# Patient Record
Sex: MALE | Race: WHITE | Employment: OTHER | URBAN - METROPOLITAN AREA
[De-identification: names, ages, dates, MRNs, and addresses within clinical notes are randomized per-mention and may not be internally consistent; named-entity substitution may affect disease eponyms.]

---

## 2018-04-30 ENCOUNTER — APPOINTMENT (OUTPATIENT)
Dept: GENERAL RADIOLOGY | Age: 68
End: 2018-04-30
Attending: PHYSICIAN ASSISTANT
Payer: MEDICARE

## 2018-04-30 ENCOUNTER — HOSPITAL ENCOUNTER (OUTPATIENT)
Age: 68
Setting detail: OBSERVATION
Discharge: HOME | End: 2018-05-01
Attending: EMERGENCY MEDICINE | Admitting: SPECIALIST
Payer: MEDICARE

## 2018-04-30 DIAGNOSIS — I21.4 NSTEMI (NON-ST ELEVATED MYOCARDIAL INFARCTION) (HCC): Primary | ICD-10-CM

## 2018-04-30 PROBLEM — I25.10 CAD (CORONARY ARTERY DISEASE): Status: ACTIVE | Noted: 2018-04-30

## 2018-04-30 LAB
ALBUMIN SERPL-MCNC: 3.8 G/DL (ref 3.5–5)
ALBUMIN/GLOB SERPL: 1 {RATIO} (ref 1.1–2.2)
ALP SERPL-CCNC: 82 U/L (ref 45–117)
ALT SERPL-CCNC: 29 U/L (ref 12–78)
ANION GAP SERPL CALC-SCNC: 7 MMOL/L (ref 5–15)
APTT PPP: 28.5 SEC (ref 22.1–32)
AST SERPL-CCNC: 30 U/L (ref 15–37)
ATRIAL RATE: 66 BPM
ATRIAL RATE: 67 BPM
BASOPHILS # BLD: 0 K/UL (ref 0–0.1)
BASOPHILS NFR BLD: 1 % (ref 0–1)
BILIRUB SERPL-MCNC: 0.7 MG/DL (ref 0.2–1)
BNP SERPL-MCNC: 208 PG/ML (ref 0–125)
BUN SERPL-MCNC: 20 MG/DL (ref 6–20)
BUN/CREAT SERPL: 19 (ref 12–20)
CALCIUM SERPL-MCNC: 9.3 MG/DL (ref 8.5–10.1)
CALCULATED P AXIS, ECG09: 47 DEGREES
CALCULATED P AXIS, ECG09: 64 DEGREES
CALCULATED R AXIS, ECG10: 10 DEGREES
CALCULATED R AXIS, ECG10: 28 DEGREES
CALCULATED T AXIS, ECG11: 50 DEGREES
CALCULATED T AXIS, ECG11: 56 DEGREES
CHLORIDE SERPL-SCNC: 101 MMOL/L (ref 97–108)
CO2 SERPL-SCNC: 28 MMOL/L (ref 21–32)
CREAT SERPL-MCNC: 1.05 MG/DL (ref 0.7–1.3)
DIAGNOSIS, 93000: NORMAL
DIAGNOSIS, 93000: NORMAL
DIFFERENTIAL METHOD BLD: NORMAL
EOSINOPHIL # BLD: 0.1 K/UL (ref 0–0.4)
EOSINOPHIL NFR BLD: 1 % (ref 0–7)
ERYTHROCYTE [DISTWIDTH] IN BLOOD BY AUTOMATED COUNT: 11.9 % (ref 11.5–14.5)
GLOBULIN SER CALC-MCNC: 3.7 G/DL (ref 2–4)
GLUCOSE SERPL-MCNC: 109 MG/DL (ref 65–100)
HCT VFR BLD AUTO: 42.8 % (ref 36.6–50.3)
HGB BLD-MCNC: 14.4 G/DL (ref 12.1–17)
IMM GRANULOCYTES # BLD: 0 K/UL (ref 0–0.04)
IMM GRANULOCYTES NFR BLD AUTO: 0 % (ref 0–0.5)
LYMPHOCYTES # BLD: 1 K/UL (ref 0.8–3.5)
LYMPHOCYTES NFR BLD: 17 % (ref 12–49)
MCH RBC QN AUTO: 32.4 PG (ref 26–34)
MCHC RBC AUTO-ENTMCNC: 33.6 G/DL (ref 30–36.5)
MCV RBC AUTO: 96.2 FL (ref 80–99)
MONOCYTES # BLD: 0.6 K/UL (ref 0–1)
MONOCYTES NFR BLD: 10 % (ref 5–13)
NEUTS SEG # BLD: 4 K/UL (ref 1.8–8)
NEUTS SEG NFR BLD: 71 % (ref 32–75)
NRBC # BLD: 0 K/UL (ref 0–0.01)
NRBC BLD-RTO: 0 PER 100 WBC
P-R INTERVAL, ECG05: 172 MS
P-R INTERVAL, ECG05: 176 MS
PLATELET # BLD AUTO: 176 K/UL (ref 150–400)
PMV BLD AUTO: 10.5 FL (ref 8.9–12.9)
POTASSIUM SERPL-SCNC: 3.8 MMOL/L (ref 3.5–5.1)
PROT SERPL-MCNC: 7.5 G/DL (ref 6.4–8.2)
Q-T INTERVAL, ECG07: 414 MS
Q-T INTERVAL, ECG07: 426 MS
QRS DURATION, ECG06: 108 MS
QRS DURATION, ECG06: 114 MS
QTC CALCULATION (BEZET), ECG08: 434 MS
QTC CALCULATION (BEZET), ECG08: 450 MS
RBC # BLD AUTO: 4.45 M/UL (ref 4.1–5.7)
SODIUM SERPL-SCNC: 136 MMOL/L (ref 136–145)
THERAPEUTIC RANGE,PTTT: NORMAL SECS (ref 58–77)
TROPONIN I SERPL-MCNC: 0.38 NG/ML
VENTRICULAR RATE, ECG03: 66 BPM
VENTRICULAR RATE, ECG03: 67 BPM
WBC # BLD AUTO: 5.6 K/UL (ref 4.1–11.1)

## 2018-04-30 PROCEDURE — C1887 CATHETER, GUIDING: HCPCS

## 2018-04-30 PROCEDURE — C1874 STENT, COATED/COV W/DEL SYS: HCPCS

## 2018-04-30 PROCEDURE — 99285 EMERGENCY DEPT VISIT HI MDM: CPT

## 2018-04-30 PROCEDURE — 74011000258 HC RX REV CODE- 258: Performed by: SPECIALIST

## 2018-04-30 PROCEDURE — 96372 THER/PROPH/DIAG INJ SC/IM: CPT

## 2018-04-30 PROCEDURE — 84484 ASSAY OF TROPONIN QUANT: CPT | Performed by: PHYSICIAN ASSISTANT

## 2018-04-30 PROCEDURE — 74011250637 HC RX REV CODE- 250/637: Performed by: EMERGENCY MEDICINE

## 2018-04-30 PROCEDURE — 93005 ELECTROCARDIOGRAM TRACING: CPT

## 2018-04-30 PROCEDURE — 74011250636 HC RX REV CODE- 250/636: Performed by: SPECIALIST

## 2018-04-30 PROCEDURE — 77030002996 HC SUT SLK J&J -A

## 2018-04-30 PROCEDURE — 93454 CORONARY ARTERY ANGIO S&I: CPT

## 2018-04-30 PROCEDURE — C1769 GUIDE WIRE: HCPCS

## 2018-04-30 PROCEDURE — 96375 TX/PRO/DX INJ NEW DRUG ADDON: CPT

## 2018-04-30 PROCEDURE — 85730 THROMBOPLASTIN TIME PARTIAL: CPT | Performed by: SPECIALIST

## 2018-04-30 PROCEDURE — C1894 INTRO/SHEATH, NON-LASER: HCPCS

## 2018-04-30 PROCEDURE — 96374 THER/PROPH/DIAG INJ IV PUSH: CPT

## 2018-04-30 PROCEDURE — 83880 ASSAY OF NATRIURETIC PEPTIDE: CPT | Performed by: PHYSICIAN ASSISTANT

## 2018-04-30 PROCEDURE — C1725 CATH, TRANSLUMIN NON-LASER: HCPCS

## 2018-04-30 PROCEDURE — 71046 X-RAY EXAM CHEST 2 VIEWS: CPT

## 2018-04-30 PROCEDURE — 77030004533 HC CATH ANGI DX IMP BSC -B

## 2018-04-30 PROCEDURE — 85025 COMPLETE CBC W/AUTO DIFF WBC: CPT | Performed by: PHYSICIAN ASSISTANT

## 2018-04-30 PROCEDURE — 77030013744

## 2018-04-30 PROCEDURE — 99218 HC RM OBSERVATION: CPT

## 2018-04-30 PROCEDURE — 74011000250 HC RX REV CODE- 250: Performed by: SPECIALIST

## 2018-04-30 PROCEDURE — 74011250637 HC RX REV CODE- 250/637: Performed by: SPECIALIST

## 2018-04-30 PROCEDURE — 65390000012 HC CONDITION CODE 44 OBSERVATION

## 2018-04-30 PROCEDURE — 36415 COLL VENOUS BLD VENIPUNCTURE: CPT | Performed by: PHYSICIAN ASSISTANT

## 2018-04-30 PROCEDURE — 74011250637 HC RX REV CODE- 250/637: Performed by: NURSE PRACTITIONER

## 2018-04-30 PROCEDURE — 80053 COMPREHEN METABOLIC PANEL: CPT | Performed by: PHYSICIAN ASSISTANT

## 2018-04-30 PROCEDURE — 93306 TTE W/DOPPLER COMPLETE: CPT

## 2018-04-30 PROCEDURE — 74011250636 HC RX REV CODE- 250/636: Performed by: NURSE PRACTITIONER

## 2018-04-30 PROCEDURE — 77030013715 HC INFL SYS MRTM -B

## 2018-04-30 PROCEDURE — 74011636320 HC RX REV CODE- 636/320: Performed by: SPECIALIST

## 2018-04-30 RX ORDER — LEVOTHYROXINE SODIUM 100 UG/1
100 TABLET ORAL
COMMUNITY

## 2018-04-30 RX ORDER — MIDAZOLAM HYDROCHLORIDE 1 MG/ML
1-10 INJECTION, SOLUTION INTRAMUSCULAR; INTRAVENOUS
Status: DISCONTINUED | OUTPATIENT
Start: 2018-04-30 | End: 2018-04-30

## 2018-04-30 RX ORDER — SODIUM CHLORIDE 0.9 % (FLUSH) 0.9 %
5 SYRINGE (ML) INJECTION AS NEEDED
Status: DISCONTINUED | OUTPATIENT
Start: 2018-04-30 | End: 2018-04-30

## 2018-04-30 RX ORDER — ATORVASTATIN CALCIUM 20 MG/1
20 TABLET, FILM COATED ORAL
Status: DISCONTINUED | OUTPATIENT
Start: 2018-04-30 | End: 2018-05-01

## 2018-04-30 RX ORDER — CLOPIDOGREL 300 MG/1
600 TABLET, FILM COATED ORAL AS NEEDED
Status: DISCONTINUED | OUTPATIENT
Start: 2018-04-30 | End: 2018-04-30

## 2018-04-30 RX ORDER — SODIUM CHLORIDE 9 MG/ML
3 INJECTION, SOLUTION INTRAVENOUS CONTINUOUS
Status: DISPENSED | OUTPATIENT
Start: 2018-04-30 | End: 2018-04-30

## 2018-04-30 RX ORDER — ATROPINE SULFATE 0.1 MG/ML
.5-1 INJECTION INTRAVENOUS AS NEEDED
Status: DISCONTINUED | OUTPATIENT
Start: 2018-04-30 | End: 2018-04-30

## 2018-04-30 RX ORDER — LORAZEPAM 2 MG/ML
4 INJECTION INTRAMUSCULAR
Status: DISCONTINUED | OUTPATIENT
Start: 2018-04-30 | End: 2018-05-01 | Stop reason: HOSPADM

## 2018-04-30 RX ORDER — SODIUM CHLORIDE 9 MG/ML
1.5 INJECTION, SOLUTION INTRAVENOUS CONTINUOUS
Status: DISPENSED | OUTPATIENT
Start: 2018-04-30 | End: 2018-04-30

## 2018-04-30 RX ORDER — MELOXICAM 15 MG/1
15 TABLET ORAL
COMMUNITY
End: 2019-02-25

## 2018-04-30 RX ORDER — HYDROCHLOROTHIAZIDE 25 MG/1
25 TABLET ORAL DAILY
Status: DISCONTINUED | OUTPATIENT
Start: 2018-05-01 | End: 2018-05-01 | Stop reason: HOSPADM

## 2018-04-30 RX ORDER — VALACYCLOVIR HYDROCHLORIDE 1 G/1
1000 TABLET, FILM COATED ORAL AS NEEDED
COMMUNITY
End: 2018-08-23

## 2018-04-30 RX ORDER — HEPARIN SODIUM 200 [USP'U]/100ML
1000 INJECTION, SOLUTION INTRAVENOUS AS NEEDED
Status: DISCONTINUED | OUTPATIENT
Start: 2018-04-30 | End: 2018-04-30

## 2018-04-30 RX ORDER — FENTANYL CITRATE 50 UG/ML
25-200 INJECTION, SOLUTION INTRAMUSCULAR; INTRAVENOUS
Status: DISCONTINUED | OUTPATIENT
Start: 2018-04-30 | End: 2018-04-30

## 2018-04-30 RX ORDER — CLOPIDOGREL BISULFATE 75 MG/1
75 TABLET ORAL DAILY
Status: DISCONTINUED | OUTPATIENT
Start: 2018-05-01 | End: 2018-05-01 | Stop reason: HOSPADM

## 2018-04-30 RX ORDER — HEPARIN SODIUM 10000 [USP'U]/100ML
10-25 INJECTION, SOLUTION INTRAVENOUS
Status: DISCONTINUED | OUTPATIENT
Start: 2018-04-30 | End: 2018-04-30

## 2018-04-30 RX ORDER — ZOLPIDEM TARTRATE 10 MG/1
10 TABLET ORAL
Status: DISCONTINUED | OUTPATIENT
Start: 2018-04-30 | End: 2018-05-01 | Stop reason: HOSPADM

## 2018-04-30 RX ORDER — LIDOCAINE HYDROCHLORIDE 10 MG/ML
10-30 INJECTION INFILTRATION; PERINEURAL
Status: DISCONTINUED | OUTPATIENT
Start: 2018-04-30 | End: 2018-04-30

## 2018-04-30 RX ORDER — ASPIRIN 81 MG/1
81 TABLET ORAL DAILY
Status: DISCONTINUED | OUTPATIENT
Start: 2018-05-01 | End: 2018-05-01 | Stop reason: HOSPADM

## 2018-04-30 RX ORDER — AMLODIPINE BESYLATE 5 MG/1
5 TABLET ORAL DAILY
COMMUNITY
End: 2018-05-30

## 2018-04-30 RX ORDER — HEPARIN SODIUM 5000 [USP'U]/ML
4000 INJECTION, SOLUTION INTRAVENOUS; SUBCUTANEOUS
Status: COMPLETED | OUTPATIENT
Start: 2018-04-30 | End: 2018-04-30

## 2018-04-30 RX ORDER — DICLOFENAC SODIUM 10 MG/G
2-4 GEL TOPICAL
COMMUNITY

## 2018-04-30 RX ORDER — SODIUM CHLORIDE 9 MG/ML
3 INJECTION, SOLUTION INTRAVENOUS AS NEEDED
Status: DISPENSED | OUTPATIENT
Start: 2018-04-30 | End: 2018-04-30

## 2018-04-30 RX ORDER — ONDANSETRON 2 MG/ML
4 INJECTION INTRAMUSCULAR; INTRAVENOUS AS NEEDED
Status: DISCONTINUED | OUTPATIENT
Start: 2018-04-30 | End: 2018-04-30

## 2018-04-30 RX ORDER — AMLODIPINE BESYLATE 5 MG/1
5 TABLET ORAL DAILY
Status: DISCONTINUED | OUTPATIENT
Start: 2018-05-01 | End: 2018-05-01 | Stop reason: HOSPADM

## 2018-04-30 RX ORDER — LOSARTAN POTASSIUM AND HYDROCHLOROTHIAZIDE 25; 100 MG/1; MG/1
1 TABLET ORAL DAILY
COMMUNITY
End: 2018-08-23 | Stop reason: DRUGHIGH

## 2018-04-30 RX ORDER — LOSARTAN POTASSIUM 50 MG/1
100 TABLET ORAL DAILY
Status: DISCONTINUED | OUTPATIENT
Start: 2018-05-01 | End: 2018-05-01 | Stop reason: HOSPADM

## 2018-04-30 RX ORDER — ASPIRIN 81 MG/1
81 TABLET ORAL DAILY
COMMUNITY

## 2018-04-30 RX ORDER — LORAZEPAM 2 MG/ML
2 INJECTION INTRAMUSCULAR
Status: DISCONTINUED | OUTPATIENT
Start: 2018-04-30 | End: 2018-05-01 | Stop reason: HOSPADM

## 2018-04-30 RX ORDER — LEVOTHYROXINE SODIUM 100 UG/1
100 TABLET ORAL
Status: DISCONTINUED | OUTPATIENT
Start: 2018-05-01 | End: 2018-05-01 | Stop reason: HOSPADM

## 2018-04-30 RX ORDER — ZOLPIDEM TARTRATE 10 MG/1
10 TABLET ORAL
COMMUNITY

## 2018-04-30 RX ORDER — NITROGLYCERIN 20 MG/100ML
5 INJECTION INTRAVENOUS
Status: DISCONTINUED | OUTPATIENT
Start: 2018-04-30 | End: 2018-04-30

## 2018-04-30 RX ORDER — SODIUM CHLORIDE 9 MG/ML
1.5 INJECTION, SOLUTION INTRAVENOUS AS NEEDED
Status: DISCONTINUED | OUTPATIENT
Start: 2018-04-30 | End: 2018-04-30 | Stop reason: HOSPADM

## 2018-04-30 RX ORDER — EPTIFIBATIDE 0.75 MG/ML
2 INJECTION, SOLUTION INTRAVENOUS AS NEEDED
Status: DISCONTINUED | OUTPATIENT
Start: 2018-04-30 | End: 2018-04-30

## 2018-04-30 RX ORDER — MORPHINE SULFATE 10 MG/ML
2 INJECTION, SOLUTION INTRAMUSCULAR; INTRAVENOUS ONCE
Status: COMPLETED | OUTPATIENT
Start: 2018-04-30 | End: 2018-04-30

## 2018-04-30 RX ORDER — HEPARIN SODIUM 1000 [USP'U]/ML
1000-10000 INJECTION, SOLUTION INTRAVENOUS; SUBCUTANEOUS AS NEEDED
Status: DISCONTINUED | OUTPATIENT
Start: 2018-04-30 | End: 2018-04-30

## 2018-04-30 RX ADMIN — CLOPIDOGREL BISULFATE 600 MG: 300 TABLET, FILM COATED ORAL at 16:57

## 2018-04-30 RX ADMIN — ZOLPIDEM TARTRATE 10 MG: 10 TABLET ORAL at 23:30

## 2018-04-30 RX ADMIN — BIVALIRUDIN 169.05 MG/HR: 250 INJECTION, POWDER, LYOPHILIZED, FOR SOLUTION INTRAVENOUS at 16:26

## 2018-04-30 RX ADMIN — FENTANYL CITRATE 25 MCG: 50 INJECTION, SOLUTION INTRAMUSCULAR; INTRAVENOUS at 16:10

## 2018-04-30 RX ADMIN — FENTANYL CITRATE 50 MCG: 50 INJECTION, SOLUTION INTRAMUSCULAR; INTRAVENOUS at 16:55

## 2018-04-30 RX ADMIN — FENTANYL CITRATE 25 MCG: 50 INJECTION, SOLUTION INTRAMUSCULAR; INTRAVENOUS at 16:31

## 2018-04-30 RX ADMIN — NITROGLYCERIN 1 INCH: 20 OINTMENT TOPICAL at 14:00

## 2018-04-30 RX ADMIN — FENTANYL CITRATE 25 MCG: 50 INJECTION, SOLUTION INTRAMUSCULAR; INTRAVENOUS at 16:38

## 2018-04-30 RX ADMIN — SODIUM CHLORIDE 1.5 ML/KG/HR: 900 INJECTION, SOLUTION INTRAVENOUS at 16:56

## 2018-04-30 RX ADMIN — MIDAZOLAM HYDROCHLORIDE 1 MG: 1 INJECTION, SOLUTION INTRAMUSCULAR; INTRAVENOUS at 16:30

## 2018-04-30 RX ADMIN — MIDAZOLAM HYDROCHLORIDE 1 MG: 1 INJECTION, SOLUTION INTRAMUSCULAR; INTRAVENOUS at 16:55

## 2018-04-30 RX ADMIN — MIDAZOLAM HYDROCHLORIDE 1 MG: 1 INJECTION, SOLUTION INTRAMUSCULAR; INTRAVENOUS at 16:16

## 2018-04-30 RX ADMIN — IOPAMIDOL 250 ML: 755 INJECTION, SOLUTION INTRAVENOUS at 16:57

## 2018-04-30 RX ADMIN — MIDAZOLAM HYDROCHLORIDE 2 MG: 1 INJECTION, SOLUTION INTRAMUSCULAR; INTRAVENOUS at 16:10

## 2018-04-30 RX ADMIN — LIDOCAINE HYDROCHLORIDE 10 ML: 10 INJECTION, SOLUTION INFILTRATION; PERINEURAL at 16:17

## 2018-04-30 RX ADMIN — SODIUM CHLORIDE 1.5 ML/KG/HR: 900 INJECTION, SOLUTION INTRAVENOUS at 18:04

## 2018-04-30 RX ADMIN — ATORVASTATIN CALCIUM 20 MG: 20 TABLET, FILM COATED ORAL at 22:02

## 2018-04-30 RX ADMIN — FENTANYL CITRATE 25 MCG: 50 INJECTION, SOLUTION INTRAMUSCULAR; INTRAVENOUS at 16:16

## 2018-04-30 RX ADMIN — HEPARIN SODIUM AND DEXTROSE 10 UNITS/KG/HR: 10000; 5 INJECTION INTRAVENOUS at 14:47

## 2018-04-30 RX ADMIN — MIDAZOLAM HYDROCHLORIDE 1 MG: 1 INJECTION, SOLUTION INTRAMUSCULAR; INTRAVENOUS at 16:38

## 2018-04-30 RX ADMIN — SODIUM CHLORIDE 3 ML/KG/HR: 900 INJECTION, SOLUTION INTRAVENOUS at 15:41

## 2018-04-30 RX ADMIN — IOPAMIDOL 50 ML: 755 INJECTION, SOLUTION INTRAVENOUS at 16:30

## 2018-04-30 RX ADMIN — HEPARIN SODIUM 4000 UNITS: 5000 INJECTION, SOLUTION INTRAVENOUS; SUBCUTANEOUS at 14:46

## 2018-04-30 RX ADMIN — MORPHINE SULFATE 2 MG: 10 INJECTION, SOLUTION INTRAMUSCULAR; INTRAVENOUS at 14:46

## 2018-04-30 RX ADMIN — HEPARIN SODIUM 2000 UNITS: 200 INJECTION, SOLUTION INTRAVENOUS at 16:09

## 2018-04-30 NOTE — PROGRESS NOTES
Cardiac Cath Lab Procedure Area Arrival Note:    Dago Barraza arrived to Cardiac Cath Lab, Procedure Area. Patient identifiers verified with NAME and DATE OF BIRTH. Procedure verified with patient. Consent forms verified. Allergies verified. Patient informed of procedure and plan of care. Questions answered with review. Patient voiced understanding of procedure and plan of care. Patient on cardiac monitor, non-invasive blood pressure, SPO2 monitor. On O2 @ 2 lpm via NC.  IV of NSS on pump at 298 ml/hr per SAFIA protocol. Patient status doing well without problems. Patient is A&Ox 4. Patient reports no complaints of pain or shortness of breath. Patient medicated during procedure with orders obtained and verified by Dr. Mellie Bloch. Refer to patients Cardiac Cath Lab PROCEDURE REPORT for vital signs, assessment, status, and response during procedure, printed at end of case. Printed report on chart or scanned into chart. 1707-Transfer to [facility] from Procedure Area    Verbal report given to Zulema Chowdhury on Dago Barraza being transferred to Cardiac Cath Lab RR for routine progression of care   Patient is post [facility] and PCI of Circ procedure. Patient stable upon transfer to . Report consisted of patients Situation, Background, Assessment and   Recommendations(SBAR). Information from the following report(s) SBAR, OR Summary and MAR was reviewed with the receiving nurse. Opportunity for questions and clarification was provided. Patient medicated during procedure with orders obtained and verified by Dr. Mellie Bloch. Refer to patient PROCEDURE REPORT for vital signs, assessment, status, and response during procedure.

## 2018-04-30 NOTE — PROGRESS NOTES
TRANSFER - OUT REPORT:    Verbal report given to Lissy RN on Mona Vines being transferred to Room 453 for routine progression of care       Report consisted of patients Situation, Background, Assessment and   Recommendations(SBAR). Information from the following report(s) SBAR, OR Summary, MAR, Recent Results and Cardiac Rhythm Sinus Ivin Pert was reviewed with the receiving nurse. Opportunity for questions and clarification was provided.

## 2018-04-30 NOTE — ED NOTES
Dr. Farhad Penaloza at bedside. Patient reports that CP returned 20 minutes ago. Pt states pain radiates to both shoulders. Pt reports taking 325mg aspirin this AM PTA. Spouse at bedside. Pt reports shakiness, slight dizziness, and diaphoresis through the night at home with CP episodes.  Denies n/v.

## 2018-04-30 NOTE — ED NOTES
TRANSFER - OUT REPORT:    Verbal report given to Etelvina Santillan (name) in the Recovery Room on Patrizia Gu  being transferred to the Cath Lab for ordered procedure       Report consisted of patients Situation, Background, Assessment and   Recommendations(SBAR). Information from the following report(s) SBAR, ED Summary, MAR, Recent Results and Cardiac Rhythm NSR with incomplete LBBB was reviewed with the receiving nurse. Lines:   Peripheral IV 04/30/18 Right Antecubital (Active)   Site Assessment Clean, dry, & intact 4/30/2018  1:02 PM   Phlebitis Assessment 0 4/30/2018  1:02 PM   Infiltration Assessment 0 4/30/2018  1:02 PM   Dressing Status Clean, dry, & intact 4/30/2018  1:02 PM   Dressing Type Transparent 4/30/2018  1:02 PM   Hub Color/Line Status Pink;Capped;Flushed;Patent 4/30/2018  1:02 PM   Action Taken Blood drawn 4/30/2018  1:02 PM        Opportunity for questions and clarification was provided. Patient transported with:   O2 @ 2 liters and 2 staff from the Cath Lab. Wife sent with Cath Lab team to waiting room. Heparin infusion begun, no acute distress upon leaving ER.

## 2018-04-30 NOTE — ED PROVIDER NOTES
HPI Comments: 75 y.o. male with past medical history significant for HTN who presents from home via private vehicle with chief complaint of chest pain. Pt reports having a 45 minute episode of substernal chest pain starting at 0900 this morning described as \"tightness\" radiating into his bilateral shoulders with accompanying slight lightheadedness and \"shaky\" feeling while sitting, then another similar episode at 1130 that was less intense and shorter in duration. Pt reports taking a 325mg ASA this morning. Pt states the pain was unchanged by exertion. Pt reports he normally takes a baby ASA daily, denies any cardiac history. Pt denies any diaphoresis, SOB, dizziness, nausea, urinary or bowel changes. There are no other acute medical concerns at this time. Social hx: Nonsmoker; Current EtOH use  PCP: Kay Ferrera MD    Note written by Tawana Kuhn, as dictated by Guicho Majano MD 1:52 PM      The history is provided by the patient and the spouse. No  was used. Past Medical History:   Diagnosis Date    Hypertension        History reviewed. No pertinent surgical history. History reviewed. No pertinent family history. Social History     Social History    Marital status:      Spouse name: N/A    Number of children: N/A    Years of education: N/A     Occupational History    Not on file. Social History Main Topics    Smoking status: Never Smoker    Smokeless tobacco: Never Used    Alcohol use 3.6 oz/week     6 Shots of liquor per week      Comment: \"6 shots of liquor per night\"    Drug use: No    Sexual activity: Not on file     Other Topics Concern    Not on file     Social History Narrative    No narrative on file         ALLERGIES: Review of patient's allergies indicates no known allergies. Review of Systems   Constitutional: Negative for chills and fever. HENT: Negative for rhinorrhea and sore throat.     Respiratory: Negative for cough and shortness of breath. Cardiovascular: Positive for chest pain. Gastrointestinal: Negative for abdominal pain, constipation, diarrhea, nausea and vomiting. Genitourinary: Negative for decreased urine volume, difficulty urinating, dysuria, frequency, hematuria and urgency. Musculoskeletal: Positive for arthralgias. Negative for myalgias. Skin: Negative for pallor and rash. Neurological: Positive for light-headedness. Negative for dizziness and weakness. All other systems reviewed and are negative. Vitals:    04/30/18 1343 04/30/18 1344 04/30/18 1348 04/30/18 1400   BP: 156/79 154/85  153/86   Pulse:  68 67 64   Resp:  16 15    SpO2:  99% 100%    Weight:  96.6 kg (213 lb)     Height:  5' 9\" (1.753 m)              Physical Exam   Vital signs reviewed. Nursing notes reviewed. Const:  No acute distress, well developed, well nourished  Head:  Atraumatic, normocephalic  Eyes:  PERRL, conjunctiva normal, no scleral icterus  Neck:  Supple, trachea midline  Cardiovascular:  RRR, no murmurs, no gallops, no rubs  Resp:  No resp distress, no increased work of breathing, no wheezes, no rhonchi, no rales,  Abd:  Soft, non-tender, non-distended, no rebound, no guarding, no CVA tenderness  :  Deferred  MSK:  No pedal edema, normal ROM  Neuro:  Alert and oriented x3, no cranial nerve defect  Skin:  Warm, dry, intact  Psych: normal mood and affect, behavior is normal, judgement and thought content is normal    Note written by Tawana Em, as dictated by Juan Luis Wong MD 1:52 PM    MDM  Number of Diagnoses or Management Options     Amount and/or Complexity of Data Reviewed  Clinical lab tests: ordered and reviewed  Tests in the radiology section of CPT®: ordered and reviewed  Review and summarize past medical records: yes    Patient Progress  Patient progress: stable      ED Course     Pt. Presents to the ER with complaints of chest pain. No signs of STEMI on EKG.   Pt. Has an elevated troponin. Pt's sx consistent with NSTEMI. Pt. To be admitted for further care by cardiology. Procedures    PROGRESS NOTE:  2:29 PM  Corinna Cobos and Dr. Sundeep Ruiz have seen the pt, pt has a cath scheduled for 3:45 today with Dr. Jazmin Decker.

## 2018-04-30 NOTE — H&P
Cardiology H&P Note      Patient Name: Lakesha Lepe  :  MRN: [id number]  Date: 2018  Time: 3:02 PM    Admit Diagnosis: NSTEMI (non-ST elevated myocardial infarction) [facility])    Primary Cardiologist: None    Consulting Cardiologist: Caridad Vo. Geovanna Mensah M.D.   PCP: Dr. Amanda Shaikh MD    Reason for Consult: chest pain, elevated troponin  Assessment/Plan/Discussion:Cardiology Attending:     Patient seen on the day of progress note and examined  and agree with Advance Practice Provider (LEON, NP,PA)  assessment and plans. Lakesha Lepe is a 75 y.o. male     Admit for CP , needs cath  3-4 episodes stuttering substernal chest pain with shortness of breath some with exertion today  Felt trouble sleeping last 3 night  Did yardwork yesterday but not mowing  Today new CP  No prior CAD, CHF known  First trop is abnormal at 0.36  On exam no rales, or edema  With CP , two EKG show NSR with ILBBB and no acute changes  risks and benefits discussed  1000 serious life threatening risk includes stroke, heart attack leading to  death  1/100 prolong hospital stay above and bleeding, groin complications, infection, renals possible but  unlikely unless baseline renal dysfunction, tear in cardiac vessel, tamponade  PCI  similar complications but benefits likely outweigh risk   Dr Ashkan Medina today at 345 pm  Started Oxygen, heparin, morphine x 2 mg , add IV beta blocker is needed  Use ASA and statin   Discussed with wife, the patient and Dr Valentino Calico in  [facility] for Etoh withdrawal  Stat echo  Chandan Loo MD            HPI:  Lakesha Lepe is a 75 y.o. male admitted on 2018  for NSTEMI (non-ST elevated myocardial infarction) ([facility] [address] Has PMH of HTN, hypothyroidism. He presents from home with chief c/o chest pain. Reports episode of substernal chest pain starting this a.m. At 0900. Started after he ate and was sitting up.   Reports pain as a tightness that radiates to both shoulders, neck and reports pain in back. Pain not worse with deep inspiration. No nausea but did have mild diaphoresis, lightheadedness. No nausea or vomiting. Episode lasted 45 min. Went to his daughters home and then climbed up and down stairs, shortly thereafter at approximately 11:30AM chest pain returned, albeit less intense,  and lasted approximately 30 minutes. He then came to ER. Had another episode in ER. Pt reports he did taking a 325mg ASA this morning. States the pain was unchanged by exertion. Denies any cardiac history and has not had any chest pain before today. Does have HTN and reports his BP has been better controlled with amlodipine added approximately 1 month ago by PCP. Reports compliance with BP meds. Took meds today. Normally able to walk around block without chest pain or SOB. Reports cough since received anesthesia for recent left finger surgery (last week). Had stress test  >10,000     Reports hx of drinking 6 alcoholic changes daily with reports of sleeplessness, irritablility when he stops drinking. Last drink was yesterday. Troponin <0.38, 12 lead EKG, NSR, incomplete LBBB    SH:  Semi-retired but still works as /manager. Never smoker. + ETOH use - 6 drinks per day. Denies use of any other drugs. FH: No FH of early CAD although his dad  at age 21 [place] [facility].     Subjective:  Currently c/o mild chest tightness which radiates to neck, also with back pain. No SOB. Assessment and Plan     1. NSTEMI; Troponin 0.38.  12 lead EKG:  NSR with incomplete LBBB  -Repeat 12 lead EKG unchanged.  -Received ASA at home  -Change nitropaste to nitro gtt and titrate for chest pain  -Morphine 2 mg IV x 1 now  -O2 NC  -Heparin bolus and infusion.  -Add BB if chest pain continues after above interventions.  -Add statin  -Stat TTE  -LHC today with Dr. Cole Terrell at 3:45 PM.  Discussed risks (1000, stroke, MI, death)    2.  Hx of HTN:  BP elevated  -Continue home meds:  Losartan-HCTZ, amlopdine 5 mg daily  -Nitro gtt as above for now. 3. Hx of HLD:  Will check lipids in A.M.    4. Hx of heavy ETOH use:   CIWA protocol after C. 6. Hx of Hypothyroism:  Continue synthroid. 75 y.o. Male with chief c/o of stuttering chest pain, NSTEMI. Will proceed with [facility] (pt agreeable) and admit to telemetry. Plan for CIWA protocol post procedure. Review of Symptoms:  Constitutional: Negative for fever, chills, weight loss, malaise/fatigue. HEENT: Negative for nosebleeds  Respiratory: + for cough, negative hemoptysis  Cardiovascular: Positive for chest pain, negative palpitations, orthopnea, leg swelling, syncope. + lightheadedness   Gastrointestinal: Negative for nausea, vomiting, diarrhea, blood in stool, abdominal pain   Genitourinary: Negative for dysuria, and hematuria. Musculoskeletal: Negative for myalgias  Skin: Negative for rash. Heme: No bleeding  Neurological: Negative for speech changes and focal weakness      Previous treatment/evaluation includes stress test .  Cardiac risk factors: dyslipidemia, male gender, hypertension. Past Medical History:   Diagnosis Date    Hypertension      History reviewed. No pertinent surgical history. Current Facility-Administered Medications   Medication Dose Route Frequency    nitroglycerin (Tridil) 200 mcg/ml infusion  5 mcg/min IntraVENous TITRATE    heparin 25,000 units in D5W 250 ml infusion  10-25 Units/kg/hr IntraVENous TITRATE     Current Outpatient Prescriptions   Medication Sig    aspirin delayed-release 81 mg tablet Take 81 mg by mouth daily.  losartan-hydroCHLOROthiazide (HYZAAR) 100-25 mg per tablet Take 1 Tab by mouth daily.  levothyroxine (SYNTHROID) 100 mcg tablet Take 100 mcg by mouth Daily (before breakfast).  amLODIPine (NORVASC) 5 mg tablet Take 5 mg by mouth daily.  meloxicam (MOBIC) 15 mg tablet Take 15 mg by mouth daily as needed for Pain.     diclofenac (VOLTAREN) 1 % gel Apply 2-4 g to affected area four (4) times daily as needed (joint pain).  valACYclovir (VALTREX) 1 gram tablet Take 1,000 mg by mouth as needed.  zolpidem (AMBIEN) 10 mg tablet Take 10 mg by mouth nightly as needed for Sleep. No Known Allergies   History reviewed. No pertinent family history. Social History     Social History    Marital status:      Spouse name: N/A    Number of children: N/A    Years of education: N/A     Social History Main Topics    Smoking status: Never Smoker    Smokeless tobacco: Never Used    Alcohol use 3.6 oz/week     6 Shots of liquor per week      Comment: \"6 shots of liquor per night\"    Drug use: No    Sexual activity: Not Asked     Other Topics Concern    None     Social History Narrative    None       Objective:    Physical Exam    Vitals:   Vitals:    04/30/18 1400 04/30/18 1415 04/30/18 1430 04/30/18 1445   BP: 153/86 165/88 157/86    Pulse: 64 68 71 63   Resp:  12 17 16   SpO2:  100% 97% 99%   Weight:       Height:           General:    Alert, cooperative, no distress, appears stated age. Neck:   Supple, no carotid bruit and no JVD. Back:     Symmetric,     Lungs:     Clear to auscultation bilaterally. Heart[de-identified]    Regular rate and rhythm, S1, S2 normal, no murmur, click, rub or gallop. Abdomen:     Soft, non-tender. Bowel sounds normal. No masses,  No      organomegaly. Extremities:   Extremities normal, atraumatic, no cyanosis or edema. Vascular:   Pulses - 2+   Skin:   Skin color normal. No rashes or lesions   Neurologic:   REYES. Telemetry: normal sinus rhythm    ECG: NSR, incomplete LBBB    Data Review:     Radiology: CXR:  No acute findings.     Recent Labs      04/30/18   1259   TROIQ  0.38*     Recent Labs      04/30/18   1259   NA  136   K  3.8   CL  101   CO2  28   BUN  20   CREA  1.05   GLU  109*   CA  9.3     Recent Labs      04/30/18   1259   WBC  5.6   HGB  14.4   HCT  42.8   PLT  176     Recent Labs 04/30/18   1259   SGOT  30   AP  82     No results for input(s): CHOL, LDLC in the last 72 hours. No lab exists for component: TGL, HDLC,  HBA1C  No results for input(s): CRP, TSH, TSHEXT in the last 72 hours. No lab exists for component: ESR    Thank you very much for this referral. I appreciate the opportunity to participate in this patient's care. I will follow along with above stated plan. Rajan Hendricksonelor.  BRENDON Leyva         [facility] [address] [place] [address] Rd[address] [facility] [address], [address] [address]     [facility], [address]     [phone]    Brooks Barnes MD

## 2018-04-30 NOTE — PROGRESS NOTES
1916: TRANSFER - IN REPORT:    Verbal report received from Isrrael Santos (name) on Dago Barraza  being received from Cath Lab(unit) for routine progression of care      Report consisted of patients Situation, Background, Assessment and   Recommendations(SBAR). Information from the following report(s) Procedure Summary was reviewed with the receiving nurse. Opportunity for questions and clarification was provided. Assessment completed upon patients arrival to unit and care assumed. 1930: Patient arrived on unit, placed on tele, groin site clean, dry and intact, vitals and database completed by Charge RN. Bedside shift change report given to [address] [place] [address] Vado, Rn  (oncoming nurse) by Selin Uribe  (offgoing nurse). Report included the following information SBAR, Kardex, Accordion and Recent Results.

## 2018-04-30 NOTE — PROGRESS NOTES
TRANSFER - IN REPORT:    Verbal report received from [facility] on Harper Gillette  being received from procedure area for routine progression of care. Report consisted of patients Situation, Background, Assessment and Recommendations(SBAR). Information from the following report(s) SBAR, Procedure Summary, MAR, Recent Results and Cardiac Rhythm NSR was reviewed with the receiving clinician. Opportunity for questions and clarification was provided. Assessment completed upon patients arrival to [address] [place] [address] and care assumed. Cardiac Cath Lab Recovery Arrival Note:    Harper Gillette arrived to [facility] recovery area. Patient procedure= LHC/Stent. Patient on cardiac monitor, non-invasive blood pressure, SPO2 monitor. On O2 @ 2 lpm via NC.  IV  of NS on pump at 145 ml/hr. Patient status doing well without problems. Patient is A&Ox 4  . Patient reports No Pain. PROCEDURE SITE CHECK:    Procedure site:without any bleeding and No Hematoma, No pain/discomfort reported at procedure site. No change in patient status. Continue to monitor patient and status.

## 2018-04-30 NOTE — PROCEDURES
Cardiac Catheterization Procedure Note   Patient: Floresita Hill  MRN: [id number]  SSN: [id number]   YOB: 1950 Age: 75 y.o. Sex: male    Date of Procedure: 4/30/2018   Pre-procedure Diagnosis: NSTEMI  Post-procedure Diagnosis: Coronary Artery Disease  Procedure: coronaries, rohini to mid lcx. No left heart or lv gram  :  Dr. Katrin Matute MD    Assistant(s):  None  Anesthesia: Moderate Sedation   Estimated Blood Loss: Less than 10 mL   Specimens Removed: None  Findings: left main ok. Lad 50% mid after d2, second tubular lesion after d2. lcx tubular ostial 40%, 50% discrete mid lcx after om2, followed by discrete 99%. 60% tubular distal lcx between om2 and 3. rca is large, scattered irregs with 50% mid pda. 2.25 by 12mm rohini to 9atm, at 99% mid lcx.  Excellent result  Complications: None   Implants:  None  Signed by:  Katrin Matute MD  4/30/2018  5:08 PM

## 2018-04-30 NOTE — ED NOTES

## 2018-04-30 NOTE — PROGRESS NOTES
TRANSFER - IN REPORT:    Verbal report received from [facility] RN(name) on Yuliana Oconnor  being received from [facility](unit) for routine progression of care      Report consisted of patients Situation, Background, Assessment and   Recommendations(SBAR). Information from the following report(s) SBAR, Kardex, MAR, Recent Results and Cardiac Rhythm NSR was reviewed with the receiving nurse. Opportunity for questions and clarification was provided. Assessment completed upon patients arrival to unit and care assumed.

## 2018-04-30 NOTE — PROGRESS NOTES
SHEATH PULL NOTE:    Patient informed of procedure with questions answered with review. Sheath site prepped with Chloraprep swab. 6 fr sheath in Right groin pulled by Roel Valencia RN. Hand hold and clotting pad, with manual compression to site. No bleeding, no hematoma, no pain at site. Hemostasis obtained with hand hold/manual compression at site. Patient tolerated well. No change in status. Handhold for 20 minutes. No change at site. Tegaderm dressing applied to site. No bleeding, no hematoma, no pain/discomfort at site. Groin instructions provided with review. Continue to monitor procedure site and patient status. *Advised patient to keep head flat and extremity flat to decrease risk of bleeding. *Recommended that patient not drink for ONE HOUR post sheath pull completion. *Recommended that patient not eat for TWO HOURS post sheath pull completion. *Instructed patient on rationale for delay of PO products to decrease risk for aspiration and if additional treatment to procedure site is required. Patient verbalized understanding of instructions with review.

## 2018-04-30 NOTE — IP AVS SNAPSHOT
[address] 
[phone] Patient: Dago Barraza MRN: [id number] ADN:[id number] About your hospitalization You were admitted on:  April 30, 2018 You last received care in the:  [facility] 4 CV SERVICES UNIT You were discharged on:  May 1, 2018 Why you were hospitalized Your primary diagnosis was:  Not on File Your diagnoses also included:  Nstemi (Non-St Elevated Myocardial Infarction) (Hcc), Cad (Coronary Artery Disease) Follow-up Information Follow up With Details Comments Contact Info Additional Information  
 Vaishali Molina MD On 5/8/2018 11AM.  please arrive 20 min early [place] [address] [address] [place] [address] 
[phone] Malu Malloy MD In 1 month post hospital [address] [place] [address] [address] [place] [address]  
[phone] [facility] CARDIAC WELLNESS Go on 6/4/2018 cardiac rehab intake appointment at 3:00 pm with Luige [address] , [address]. Please arrive 15 minutes prior to your appointment time and you will register in the [facility] 100, [address], on the first floor of the [address] [place] [address] Road. Telephone: [phone] Fax: [phone] Driving directions To [facility] [place]. Building: Driving WEST on [address], take exit [address] to [employer]. Turn left onto [facility], then turn right into [employer] Parking lot Driving EAST on [address], take exit [address] [facility]. Turn right at the end of the exit ramp. Turn left onto [facility], then turn right into [employer] lot. 5170 [facility] On 5/3/2018 one time skilled nursing visit 7009 [facility] [address] 
[phone] Your Scheduled Appointments  Tuesday May 08, 2018 11:00 AM EDT  
ESTABLISHED PATIENT with Vaishali Molina MD  
 [facility] (SHENA SCHEDULING) [address]  
[phone] Monday June 04, 2018  3:00 PM EDT  
CR INTAKE with Shanna Palacios RN  
[address] ([address]) [address] [facility] [address] [phone]  
  
    
[address], [address]. Please arrive 15 minutes prior to your appointment time and you will register in the [facility] 100, [address], on the first floor of the [address] Telephone: [phone] Fax: [phone] Driving directions To [facility] [place]. Building: Driving WEST on [address], take exit [address] to [employer]. Turn left onto [address], then turn right into [employer] Parking lot Driving EAST on [address], take exit [address] [facility]. Turn right at the end of the exit ramp. Turn left onto [address], then turn right into [employer] lot. Discharge Orders None A check valente indicates which time of day the medication should be taken. My Medications START taking these medications Instructions Each Dose to Equal  
 Morning Noon Evening Bedtime  
 atorvastatin 40 mg tablet Commonly known as:  LIPITOR Your last dose was: Your next dose is: Take 1 Tab by mouth nightly. 40 mg  
    
   
   
   
  
 clopidogrel 75 mg Tab Commonly known as:  PLAVIX Your last dose was: Your next dose is: Take 1 Tab by mouth daily. 75 mg  
    
   
   
   
  
 nitroglycerin 0.4 mg SL tablet Commonly known as:  NITROSTAT Your last dose was: Your next dose is:    
   
   
 1 Tab by SubLINGual route every five (5) minutes as needed for Chest Pain. 0.4 mg  
    
   
   
   
  
  
CONTINUE taking these medications Instructions Each Dose to Equal  
 Morning Noon Evening Bedtime  
 amLODIPine 5 mg tablet Commonly known as:  August Art Your last dose was: Your next dose is: Take 5 mg by mouth daily. 5 mg  
    
   
   
   
  
 aspirin delayed-release 81 mg tablet Your last dose was: Your next dose is: Take 81 mg by mouth daily. 81 mg  
    
   
   
   
  
 levothyroxine 100 mcg tablet Commonly known as:  SYNTHROID Your last dose was: Your next dose is: Take 100 mcg by mouth Daily (before breakfast). 100 mcg  
    
   
   
   
  
 losartan-hydroCHLOROthiazide 100-25 mg per tablet Commonly known as:  HYZAAR Your last dose was: Your next dose is: Take 1 Tab by mouth daily. 1 Tab  
    
   
   
   
  
 meloxicam 15 mg tablet Commonly known as:  MOBIC Your last dose was: Your next dose is: Take 15 mg by mouth daily as needed for Pain. 15 mg  
    
   
   
   
  
 VALTREX 1 gram tablet Generic drug:  valACYclovir Your last dose was: Your next dose is: Take 1,000 mg by mouth as needed. 1000 mg  
    
   
   
   
  
 zolpidem 10 mg tablet Commonly known as:  AMBIEN Your last dose was: Your next dose is: Take 10 mg by mouth nightly as needed for Sleep. 10 mg ASK your doctor about these medications Instructions Each Dose to Equal  
 Morning Noon Evening Bedtime  
 diclofenac 1 % Gel Commonly known as:  VOLTAREN Your last dose was: Your next dose is:    
   
   
 Apply 2-4 g to affected area four (4) times daily as needed (joint pain). 2-4 g Where to Get Your Medications These medications were sent to [facility]/pharmacy #[id number], [address] [place] [address]  [address] [facility], [address] [place] [address] Phone:  [phone]  
  atorvastatin 40 mg tablet  
 clopidogrel 75 mg Tab nitroglycerin 0.4 mg SL tablet Discharge Instructions CARDIAC CATHETERIZATION/PCI DISCHARGE INSTRUCTIONS It is normal to feel tired the first couple days. Take it easy and follow the physicians instructions. CHECK THE CATHETER INSERTION SITE DAILY: 
 
You may shower 24 hours after the procedure, remove the bandage during showering. Wash with soap and water and pat dry. Gentle cleaning of the site with soap and water is sufficient, cover with a dry clean dressing or bandage. Do not apply creams or powders to the area. Do not sit in a bathtub or pool of water for 7 days or until wound has completely healed. Temporary bruising and discomfort is normal and may last a few weeks. You may have a  formation of a small lump at the site which may last up to 6 weeks. CALL THE PHYSICIANS: 
 
If the site becomes red, swollen or feels warm to the touch If there is bleeding or drainage or if there is unusual pain at the groin or down the leg. If there is any bleeding, lie down, apply pressure or have someone apply pressure with a clean cloth until the bleeding stops. If the bleeding continues, call 911 to be transported to the hospital. 
DO  [facility] [address] ACTIVITY: 
 
For the first 24-48 hours or as instructed by the physician: No lifting, pushing or pulling over 10 pounds and no straining the insertion site. Do not life grocery bags or the garbage can, do not run the vacuum  or  for 7 days. Start with short walks as in the hospital and gradually increase as tolerated each day. It is recommended to walk 30 minutes 5-7 days per week. Follow your physicians instructions on activity. Avoid walking outside in extremes of heat or cold. Walk inside when it is cold and windy or hot and humid. Things to keep in mind: 
No driving for at least 24 hours, or as designated by your physician. Limit the number of times you go up and down the stairs Take rests and pace yourself with activity. Be careful and do not strain with bowel movements. MEDICATIONS: 
 
Take all medications as prescribed Call your physician if you have any questions Keep an updated list of your medications with you at all times and give a list to your physician and pharmacist 
 
 
 
SIGNS AND SYMPTOMS: 
 
Be cautious of symptoms of angina or recurrent symptoms such as chest discomfort, unusual shortness of breath or fatigue. These could be symptoms of restenosis, a new blockage or a heart attack. If your symptoms are relieved with rest it is still recommended that you notify your physician of recurrent chest pain or discomfort. FOR CHEST PAIN or symptoms of angina not relieved with rest:  If the discomfort is not relieved with rest, and you have been prescribed Nitroglycerin, take as directed (taken under the tongue, one at a time 5 minutes apart for a total of 3 doses). If the discomfort is not relieved after the 3rd nitroglycerin, call 911. If you have not been prescribed Nitroglycerin  and your chest discomfort is not relieved with rest, call 911. AFTER CARE: 
 
Follow up with your physician as instructed. Follow a heart healthy diet with proper portion control, daily stress management, daily exercise, blood pressure and cholesterol control , and smoking cessation. Learning About Coronary Artery Disease (CAD) What is coronary artery disease? Coronary artery disease (CAD) occurs when plaque builds up in the arteries that bring oxygen-rich blood to your heart. Plaque is a fatty substance made of cholesterol, calcium, and other substances in the blood. This process is called hardening of the arteries, or atherosclerosis. What happens when you have coronary artery disease? · Plaque may narrow the coronary arteries.  Narrowed arteries cause poor blood flow. This can lead to angina symptoms such as chest pain or discomfort. If blood flow is completely blocked, you could have a heart attack. · You can slow CAD and reduce the risk of future problems by making changes in your lifestyle. These include quitting smoking and eating heart-healthy foods. · Treatments for CAD, along with changes in your lifestyle, can help you live a longer and healthier life. How can you prevent coronary artery disease? · Do not smoke. It may be the best thing you can do to prevent heart disease. If you need help quitting, talk to your doctor about stop-smoking programs and medicines. These can increase your chances of quitting for good. · Be active. Get at least 30 minutes of exercise on most days of the week. Walking is a good choice. You also may want to do other activities, such as running, swimming, cycling, or playing tennis or team sports. · Eat heart-healthy foods. Eat more fruits and vegetables and less foods that contain saturated and trans fats. Limit alcohol, sodium, and sweets. · Stay at a healthy weight. Lose weight if you need to. · Manage other health problems such as diabetes, high blood pressure, and high cholesterol. · Manage stress. Stress can hurt your heart. To keep stress low, talk about your problems and feelings. Don't keep your feelings hidden. · If you have talked about it with your doctor, take a low-dose aspirin every day. Aspirin can help certain people lower their risk of a heart attack or stroke. But taking aspirin isn't right for everyone, because it can cause serious bleeding. Do not start taking daily aspirin unless your doctor knows about it. How is coronary artery disease treated? · Your doctor will suggest that you make lifestyle changes. For example, your doctor may ask you to eat healthy foods, quit smoking, lose extra weight, and be more active. · You will have to take medicines. · Your doctor may suggest a procedure to open narrowed or blocked arteries. This is called angioplasty. Or your doctor may suggest using healthy blood vessels to create detours around narrowed or blocked arteries. This is called bypass surgery. Follow-up care is a key part of your treatment and safety. Be sure to make and go to all appointments, and call your doctor if you are having problems. It's also a good idea to know your test results and keep a list of the medicines you take. Where can you learn more? Go to http://erick-bernabe.info/. Enter ([phone] 5222 9519 in the search box to learn more about \"Learning About Coronary Artery Disease (CAD). \" Current as of: September 21, 2016 Content Version: 11.4 © 4178-7870 ViaView. Care instructions adapted under license by [employer] (which disclaims liability or warranty for this information). If you have questions about a medical condition or this instruction, always ask your healthcare professional. [place] [address] any warranty or liability for your use of this information. USE MELOXICAM sparingly. Reduce alcohol intake to 2 drinks maximum per day. You may return to work on Friday 5/4/18 light duty [email or web address] Announcement We are excited to announce that we are making your provider's discharge notes available to you in [email or web address]. You will see these notes when they are completed and signed by the physician that discharged you from your recent hospital stay. If you have any questions or concerns about any information you see in [email or web address], please call the Health Information Department where you were seen or reach out to your Primary Care Provider for more information about your plan of care. Introducing [facility]!    
 Karrie Plummer introduces [email or web address] patient portal. Now you can access parts of your medical record, email your doctor's office, and request medication refills online. 1. In your internet browser, go to [email or web address] 2. Click on the First Time User? Click Here link in the Sign In box. You will see the New Member Sign Up page. 3. Enter your Veotag Access Code exactly as it appears below. You will not need to use this code after youve completed the sign-up process. If you do not sign up before the expiration date, you must request a new code. · Veotag Access Code: [email or web address] Expires: 7/29/2018 12:41 PM 
 
4. Enter the last four digits of your Social Security Number (xxxx) and Date of Birth (mm/dd/yyyy) as indicated and click Submit. You will be taken to the next sign-up page. 5. Create a Diversied Arts And Entertainmentt ID. This will be your Veotag login ID and cannot be changed, so think of one that is secure and easy to remember. 6. Create a Veotag password. You can change your password at any time. 7. Enter your Password Reset Question and Answer. This can be used at a later time if you forget your password. 8. Enter your e-mail address. You will receive e-mail notification when new information is available in [address] 9. Click Sign Up. You can now view and download portions of your medical record. 10. Click the Download Summary menu link to download a portable copy of your medical information. If you have questions, please visit the Frequently Asked Questions section of the Veotag website. Remember, Veotag is NOT to be used for urgent needs. For medical emergencies, dial 911. Now available from your iPhone and Android! Introducing Cheng Rayo As a [facility] patient, I wanted to make you aware of our electronic visit tool called Cheng Rayo. [facility] 24/7 allows you to connect within minutes with a medical provider 24 hours a day, seven days a week via a mobile device or tablet or logging into a secure website from your computer.   You can access [address] [employer] 24/7 from anywhere in the [place]. A virtual visit might be right for you when you have a simple condition and feel like you just dont want to get out of bed, or cant get away from work for an appointment, when your regular [facility] provider is not available (evenings, weekends or holidays), or when youre out of town and need minor care. Electronic visits cost only $49 and if the [facility] 24/7 provider determines a prescription is needed to treat your condition, one can be electronically transmitted to a nearby pharmacy*. Please take a moment to enroll today if you have not already done so. The enrollment process is free and takes just a few minutes. To enroll, please download the [facility] 24/7 maikel to your tablet or phone, or visit [email or web address] to enroll on your computer. And, as an [address] [place] [address] patient with a Merfac account, the results of your visits will be scanned into your electronic medical record and your primary care provider will be able to view the scanned results. We urge you to continue to see your regular [facility] provider for your ongoing medical care. And while your primary care provider may not be the one available when you seek a Intiomehranfin virtual visit, the peace of mind you get from getting a real diagnosis real time can be priceless. For more information on Cheng [facility] Globalmahad, view our Frequently Asked Questions (FAQs) at [email or web address]. Sincerely, 
 
Cayla Goodrich MD 
Chief Medical Officer [facility] Darlene Mcmanus *:  certain medications cannot be prescribed via Intiomehranfin Providers Seen During Your Hospitalization Provider Specialty Primary office phone Grady Downing MD Emergency Medicine [phone] Gerry Mata MD Cardiology [phone] Lauren Wilks MD Cardiology [phone] Your Primary Care Physician (PCP) Primary Care Physician Office Phone Office Fax Jaime [facility] [phone] You are allergic to the following No active allergies Recent Documentation Height Weight BMI Smoking Status 1.753 m 94.6 kg 30.8 kg/m2 Never Smoker Emergency Contacts Name Discharge Info Relation Home Work Mobile [address] CAREGIVER [3] Spouse [3] [phone] Patient Belongings The following personal items are in your possession at time of discharge: 
  Dental Appliances: None  Visual Aid: At bedside, Glasses      Home Medications: None   Jewelry: None  Clothing: At bedside    Other Valuables: None Please provide this summary of care documentation to your next provider. Signatures-by signing, you are acknowledging that this After Visit Summary has been reviewed with you and you have received a copy. Patient Signature:  ____________________________________________________________ Date:  ____________________________________________________________  
  
Jaimee Evans Provider Signature:  ____________________________________________________________ Date:  ____________________________________________________________

## 2018-05-01 ENCOUNTER — HOME HEALTH ADMISSION (OUTPATIENT)
Dept: HOME HEALTH SERVICES | Facility: HOME HEALTH | Age: 68
End: 2018-05-01

## 2018-05-01 VITALS
OXYGEN SATURATION: 98 % | WEIGHT: 208.56 LBS | BODY MASS INDEX: 30.89 KG/M2 | RESPIRATION RATE: 16 BRPM | HEART RATE: 63 BPM | HEIGHT: 69 IN | SYSTOLIC BLOOD PRESSURE: 150 MMHG | TEMPERATURE: 97.6 F | DIASTOLIC BLOOD PRESSURE: 80 MMHG

## 2018-05-01 LAB
ATRIAL RATE: 53 BPM
ATRIAL RATE: 56 BPM
CALCULATED P AXIS, ECG09: 30 DEGREES
CALCULATED P AXIS, ECG09: 36 DEGREES
CALCULATED R AXIS, ECG10: 34 DEGREES
CALCULATED R AXIS, ECG10: 40 DEGREES
CALCULATED T AXIS, ECG11: 28 DEGREES
CALCULATED T AXIS, ECG11: 54 DEGREES
CHOLEST SERPL-MCNC: 169 MG/DL
DIAGNOSIS, 93000: NORMAL
DIAGNOSIS, 93000: NORMAL
HDLC SERPL-MCNC: 82 MG/DL
HDLC SERPL: 2.1 {RATIO} (ref 0–5)
LDLC SERPL CALC-MCNC: 69 MG/DL (ref 0–100)
LIPID PROFILE,FLP: NORMAL
P-R INTERVAL, ECG05: 174 MS
P-R INTERVAL, ECG05: 176 MS
Q-T INTERVAL, ECG07: 436 MS
Q-T INTERVAL, ECG07: 458 MS
QRS DURATION, ECG06: 100 MS
QRS DURATION, ECG06: 112 MS
QTC CALCULATION (BEZET), ECG08: 420 MS
QTC CALCULATION (BEZET), ECG08: 429 MS
TRIGL SERPL-MCNC: 90 MG/DL (ref ?–150)
VENTRICULAR RATE, ECG03: 53 BPM
VENTRICULAR RATE, ECG03: 56 BPM
VLDLC SERPL CALC-MCNC: 18 MG/DL

## 2018-05-01 PROCEDURE — 36415 COLL VENOUS BLD VENIPUNCTURE: CPT | Performed by: SPECIALIST

## 2018-05-01 PROCEDURE — 80061 LIPID PANEL: CPT | Performed by: SPECIALIST

## 2018-05-01 PROCEDURE — 99218 HC RM OBSERVATION: CPT

## 2018-05-01 PROCEDURE — 74011250637 HC RX REV CODE- 250/637: Performed by: SPECIALIST

## 2018-05-01 PROCEDURE — 93005 ELECTROCARDIOGRAM TRACING: CPT

## 2018-05-01 PROCEDURE — 74011250637 HC RX REV CODE- 250/637: Performed by: NURSE PRACTITIONER

## 2018-05-01 RX ORDER — ATORVASTATIN CALCIUM 40 MG/1
40 TABLET, FILM COATED ORAL
Qty: 90 TAB | Refills: 3 | Status: SHIPPED | OUTPATIENT
Start: 2018-05-01 | End: 2018-05-08 | Stop reason: ALTCHOICE

## 2018-05-01 RX ORDER — NITROGLYCERIN 0.4 MG/1
0.4 TABLET SUBLINGUAL
Status: DISCONTINUED | OUTPATIENT
Start: 2018-05-01 | End: 2018-05-01 | Stop reason: HOSPADM

## 2018-05-01 RX ORDER — CLOPIDOGREL BISULFATE 75 MG/1
75 TABLET ORAL DAILY
Qty: 90 TAB | Refills: 3 | Status: SHIPPED | OUTPATIENT
Start: 2018-05-01 | End: 2019-04-25 | Stop reason: SDUPTHER

## 2018-05-01 RX ORDER — ATORVASTATIN CALCIUM 40 MG/1
40 TABLET, FILM COATED ORAL
Status: DISCONTINUED | OUTPATIENT
Start: 2018-05-01 | End: 2018-05-01 | Stop reason: HOSPADM

## 2018-05-01 RX ORDER — NITROGLYCERIN 0.4 MG/1
0.4 TABLET SUBLINGUAL
Qty: 1 BOTTLE | Refills: 3 | Status: SHIPPED | OUTPATIENT
Start: 2018-05-01 | End: 2019-10-04 | Stop reason: SDUPTHER

## 2018-05-01 RX ADMIN — CLOPIDOGREL BISULFATE 75 MG: 75 TABLET ORAL at 09:05

## 2018-05-01 RX ADMIN — LOSARTAN POTASSIUM 100 MG: 50 TABLET ORAL at 09:04

## 2018-05-01 RX ADMIN — AMLODIPINE BESYLATE 5 MG: 5 TABLET ORAL at 09:04

## 2018-05-01 RX ADMIN — LEVOTHYROXINE SODIUM 100 MCG: 100 TABLET ORAL at 07:14

## 2018-05-01 RX ADMIN — ASPIRIN 81 MG: 81 TABLET, COATED ORAL at 09:04

## 2018-05-01 RX ADMIN — HYDROCHLOROTHIAZIDE 25 MG: 25 TABLET ORAL at 09:04

## 2018-05-01 NOTE — PHYSICIAN ADVISORY
Letter of Status Determination:   Recommend hospitalization status upgraded from   OBSERVATION  to INPATIENT  Status     Pt Name:  Elizabeth Cary   MR#   [address] [facility] # [id number] /  [id number]   [facility]#  [id number]   [address] [place] [address]  453/01  @ [facility]   Hospitalization date  4/30/2018  1:35 PM   Current Attending Physician  Jalen Mehta MD   Principal diagnosis  <principal problem not specified>   NSTEMI    Clinicals  75 y.o. y.o  male hospitalized with above diagnosis   The pt was noted to have elevated troponin level and he was taken to cath lab and after LHC he was found to have significant ASVD and medical management was opted. His risk of deterioration at the time were very high. [place] (MCG) criteria   Does  NOT apply    STATUS DETERMINATION  This patient is at high risk of adverse events and deterioration based on documented clinical data, comorbid conditions and current acute care course. Mr. Elizabeth Cary is expected to meet Inpatient Admission status criteria in accordance with CMS regulation Section 43 .3. Specifically, due to medical necessity the patient's stay is expected to exceed Two Midnights. It is our recommendation that this patient's hospitalization status should be upgraded from  OBSERVATION to INPATIENT status. The final decision of the patient's hospitalization status depends on the attending physician's judgment. Additional comments     Payor: Gretchen Levels / Plan: [facility] / Product Type: PPO /         Isidro Mari MD MPH FACP     Physician Advisor    [address]   President Medical Staff, [address] [place] [address]    Cell  [phone]        [phone]    .

## 2018-05-01 NOTE — PROGRESS NOTES
Hospital follow-up PCP transitional care appointment has been scheduled with Dr. Nick Rodríguez for Friday, 5/11/18 at 11:45 a.m. Pending patient discharge.   Tita Tenorio, Care Management Specialist.

## 2018-05-01 NOTE — DISCHARGE INSTRUCTIONS
CARDIAC CATHETERIZATION/PCI DISCHARGE INSTRUCTIONS    It is normal to feel tired the first couple days. Take it easy and follow the physicians instructions. CHECK THE CATHETER INSERTION SITE DAILY:    You may shower 24 hours after the procedure, remove the bandage during showering. Wash with soap and water and pat dry. Gentle cleaning of the site with soap and water is sufficient, cover with a dry clean dressing or bandage. Do not apply creams or powders to the area. Do not sit in a bathtub or pool of water for 7 days or until wound has completely healed. Temporary bruising and discomfort is normal and may last a few weeks. You may have a  formation of a small lump at the site which may last up to 6 weeks. CALL THE PHYSICIANS:    If the site becomes red, swollen or feels warm to the touch  If there is bleeding or drainage or if there is unusual pain at the groin or down the leg. If there is any bleeding, lie down, apply pressure or have someone apply pressure with a clean cloth until the bleeding stops. If the bleeding continues, call 911 to be transported to the hospital.  DO NOT DRIVE YOURSELF, [place] [address]. ACTIVITY:    For the first 24-48 hours or as instructed by the physician:  No lifting, pushing or pulling over 10 pounds and no straining the insertion site. Do not life grocery bags or the garbage can, do not run the vacuum  or  for 7 days. Start with short walks as in the hospital and gradually increase as tolerated each day. It is recommended to walk 30 minutes 5-7 days per week. Follow your physicians instructions on activity. Avoid walking outside in extremes of heat or cold. Walk inside when it is cold and windy or hot and humid. Things to keep in mind:  No driving for at least 24 hours, or as designated by your physician.   Limit the number of times you go up and down the stairs  Take rests and pace yourself with activity. Be careful and do not strain with bowel movements. MEDICATIONS:    Take all medications as prescribed  Call your physician if you have any questions  Keep an updated list of your medications with you at all times and give a list to your physician and pharmacist        SIGNS AND SYMPTOMS:    Be cautious of symptoms of angina or recurrent symptoms such as chest discomfort, unusual shortness of breath or fatigue. These could be symptoms of restenosis, a new blockage or a heart attack. If your symptoms are relieved with rest it is still recommended that you notify your physician of recurrent chest pain or discomfort. FOR CHEST PAIN or symptoms of angina not relieved with rest:  If the discomfort is not relieved with rest, and you have been prescribed Nitroglycerin, take as directed (taken under the tongue, one at a time 5 minutes apart for a total of 3 doses). If the discomfort is not relieved after the 3rd nitroglycerin, call 911. If you have not been prescribed Nitroglycerin  and your chest discomfort is not relieved with rest, call 911. AFTER CARE:    Follow up with your physician as instructed. Follow a heart healthy diet with proper portion control, daily stress management, daily exercise, blood pressure and cholesterol control , and smoking cessation. Learning About Coronary Artery Disease (CAD)  What is coronary artery disease? Coronary artery disease (CAD) occurs when plaque builds up in the arteries that bring oxygen-rich blood to your heart. Plaque is a fatty substance made of cholesterol, calcium, and other substances in the blood. This process is called hardening of the arteries, or atherosclerosis. What happens when you have coronary artery disease? · Plaque may narrow the coronary arteries. Narrowed arteries cause poor blood flow. This can lead to angina symptoms such as chest pain or discomfort.  If blood flow is completely blocked, you could have a heart attack. · You can slow CAD and reduce the risk of future problems by making changes in your lifestyle. These include quitting smoking and eating heart-healthy foods. · Treatments for CAD, along with changes in your lifestyle, can help you live a longer and healthier life. How can you prevent coronary artery disease? · Do not smoke. It may be the best thing you can do to prevent heart disease. If you need help quitting, talk to your doctor about stop-smoking programs and medicines. These can increase your chances of quitting for good. · Be active. Get at least 30 minutes of exercise on most days of the week. Walking is a good choice. You also may want to do other activities, such as running, swimming, cycling, or playing tennis or team sports. · Eat heart-healthy foods. Eat more fruits and vegetables and less foods that contain saturated and trans fats. Limit alcohol, sodium, and sweets. · Stay at a healthy weight. Lose weight if you need to. · Manage other health problems such as diabetes, high blood pressure, and high cholesterol. · Manage stress. Stress can hurt your heart. To keep stress low, talk about your problems and feelings. Don't keep your feelings hidden. · If you have talked about it with your doctor, take a low-dose aspirin every day. Aspirin can help certain people lower their risk of a heart attack or stroke. But taking aspirin isn't right for everyone, because it can cause serious bleeding. Do not start taking daily aspirin unless your doctor knows about it. How is coronary artery disease treated? · Your doctor will suggest that you make lifestyle changes. For example, your doctor may ask you to eat healthy foods, quit smoking, lose extra weight, and be more active. · You will have to take medicines. · Your doctor may suggest a procedure to open narrowed or blocked arteries. This is called angioplasty.  Or your doctor may suggest using healthy blood vessels to create detours around narrowed or blocked arteries. This is called bypass surgery. Follow-up care is a key part of your treatment and safety. Be sure to make and go to all appointments, and call your doctor if you are having problems. It's also a good idea to know your test results and keep a list of the medicines you take. Where can you learn more? Go to http://erick-bernabe.info/. Enter ([phone] [phone] in the search box to learn more about \"Learning About Coronary Artery Disease (CAD). \"  Current as of: September 21, 2016  Content Version: 11.4  © 7935-9019 [employer]. Care instructions adapted under license by [employer] (which disclaims liability or warranty for this information). If you have questions about a medical condition or this instruction, always ask your healthcare professional. [address] any warranty or liability for your use of this information. USE MELOXICAM sparingly. Reduce alcohol intake to 2 drinks maximum per day.   You may return to work on Friday 5/4/18 light duty

## 2018-05-01 NOTE — PROGRESS NOTES
Reason for Admission:   NSTEMI                   RRAT Score:       8              Plan for utilizing home health:      Hospital to home visit                    Likelihood of Readmission:  low                         Transition of Care Plan:        Home with wife with [facility] and cardiology follow-up                CM spoke with patient and wife and they are agreeable to an [facility] - is independent with ADL's and IADL's - patient's Medicare is primary and [employer] is secondary - referral made to [facility] to home and they can accept.  ANEUDY Saavedra

## 2018-05-01 NOTE — DISCHARGE SUMMARY
Cardiology Discharge Summary     Patient ID:  Blade Sheridan  [id number]  21 y.o.  1950    Admit Date: 4/30/2018  Discharge Date: 5/1/18  Admitting Physician: Michael Larose MD    Discharge Physician: Dr. Michael Larose MD  Admission Diagnoses:   NSTEMI (non-ST elevated myocardial infarction) [facility])  CAD (coronary artery disease)    Discharge Diagnoses: Active Problems:    NSTEMI (non-ST elevated myocardial infarction) ([facility] [address] (4/30/2018)      CAD (coronary artery disease) (4/30/2018)        Discharge Condition: Good    Cardiology Procedures this Admission:  Left heart catheterization with PCI  EchoCardiogram     [facility] Findings: left main ok. Lad 50% mid after d2, second tubular lesion after d2. lcx tubular ostial 40%, 50% discrete mid lcx after om2, followed by discrete 99%. 60% tubular distal lcx between om2 and 3. rca is large, scattered irregs with 50% mid pda. 2.25 by 12mm rohini to 9atm, at 99% mid lcx. Excellent result  Consults: None    Hospital Course: Blade Sheridan is a 75 y.o. male admitted on 4/30/2018  for NSTEMI (non-ST elevated myocardial infarction) ([facility] [address] Has PMH of HTN, hypothyroidism. He presents from home with chief c/o stuttering chest pain. Noted to have an initial troponin of 0.38, 12 lead EKG showed NSR with incomplete LBBB. Pstops drinking. Last drink was yesterday. Troponin <0.38, 12 lead EKG, NSR. He underwent LHC and PCI/stent to Left mid CX. He also had multi vessal disease also that did not require stenting but will be treated medically. He was chest pain free on day of discharge. He was discharged in ASA, statin, Plavix and nitro prn. He was not discharged on BB due to bradycardia (rates in 50's) to low NL HR (60's). 1. NSTEMI: CAD as above, s/p ROHINI to L CX.   -TTE:  NL EF, NWMA, valves ok  -Troponin 0.38, initial EKG NSR with incomplete LBBB  -ASA 81 mg daily  -Plavix 75 mg daily  -Atorvastatin 40 mg q HS  -Nitroglycerine SL prn.  -NO beta blocker due to bradycardia/low NL HR  -Received IP cx to cardiac rehab. -Diet modification/weight loss discussed/encouraged  Future Appointments  Date Time Provider Scotty Henderson   5/8/2018 11:00 AM Penny Kwan  [address]        2. Hx of HTN:    -Losartan-HCTZ, amlopdine 5 mg daily     3. Hx of heavy ETOH use:     -Reduction in ETOH intake advised    4. Hx of Hypothyroism:  Continue synthroid. Assessment/Plan/Discussion:Cardiology Attending:     Patient seen on the day of progress note and examined  and agree with Advance Practice Provider (LEON, NP,PA)  assessment and plans. Tho Mon is a 75 y.o. male   Went over anatomy  Treatment with DAPT and statin  Benefits and risks of all meds including statin  Keep out of work 2-3 days at least  Cut back on carbs, etoh, lose weight, get \"skinny\"  High potency statin, would Crestor be affordable  See back in 7-10 days      Penny Kwan MD          Visit Vitals    /80 (BP 1 Location: Right arm, BP Patient Position: At rest)    Pulse 63    Temp 97.6 °F (36.4 °C)    Resp 16    Ht 5' 9\" (1.753 m)    Wt 94.6 kg (208 lb 8.9 oz)    SpO2 98%    BMI 30.8 kg/m2       Physical Exam  Abdomen: soft, non-tender. Bowel sounds normal.  Extremities: no LE edema, + PP bilaterally. Rt groin dressing removed- no hematoma or active bleeding at site noted.    Heart: regular rate and rhythm, S1, S2 normal, no murmurs, clicks, rubs or gallops  Lungs: clear to auscultation bilaterally  Neck: supple, symmetrical, trachea midline,   Neurologic: Grossly normal  Pulses: 2+ and symmetrical    Labs:   Recent Labs      04/30/18   1259   WBC  5.6   HGB  14.4   HCT  42.8   PLT  176     Recent Labs      04/30/18   1259   NA  136   K  3.8   CL  101   CO2  28   GLU  109*   BUN  20   CREA  1.05   CA  9.3   ALB  3.8   SGOT  30   ALT  29       Recent Labs      04/30/18   1259   TROIQ  0.38*       EKG: Normal sinus rhythm   Incomplete left bundle branch block   No previous ECGs available   Confirmed by Magali Padgett MD. ([id number]) on 4/30/2018 11:55:36 PM   CXR:   Other Diagnostic Tests:   TTE 4/30/18: Left ventricle: Systolic function was normal. Ejection fraction was  estimated in the range of 55 % to 60 %. There were no regional wall motion  abnormalities. Device check:     Disposition: Home    Patient Instructions:   Current Discharge Medication List      START taking these medications    Details   atorvastatin (LIPITOR) 40 mg tablet Take 1 Tab by mouth nightly. Qty: 90 Tab, Refills: 3      clopidogrel (PLAVIX) 75 mg tab Take 1 Tab by mouth daily. Qty: 90 Tab, Refills: 3      nitroglycerin (NITROSTAT) 0.4 mg SL tablet 1 Tab by SubLINGual route every five (5) minutes as needed for Chest Pain. Qty: 1 Bottle, Refills: 3         CONTINUE these medications which have NOT CHANGED    Details   aspirin delayed-release 81 mg tablet Take 81 mg by mouth daily. losartan-hydroCHLOROthiazide (HYZAAR) 100-25 mg per tablet Take 1 Tab by mouth daily. levothyroxine (SYNTHROID) 100 mcg tablet Take 100 mcg by mouth Daily (before breakfast). amLODIPine (NORVASC) 5 mg tablet Take 5 mg by mouth daily. meloxicam (MOBIC) 15 mg tablet Take 15 mg by mouth daily as needed for Pain. diclofenac (VOLTAREN) 1 % gel Apply 2-4 g to affected area four (4) times daily as needed (joint pain). valACYclovir (VALTREX) 1 gram tablet Take 1,000 mg by mouth as needed. zolpidem (AMBIEN) 10 mg tablet Take 10 mg by mouth nightly as needed for Sleep. Reference discharge instructions provided by nursing for diet and activity. Follow-up with   Future Appointments  Date Time Provider Scotty Henderson   5/8/2018 11:00 AM MD Amadeo King       Signed:  Kalyan Miller.  BRENDON Leyva

## 2018-05-01 NOTE — PROGRESS NOTES
1930: Bedside shift change report given to [address] [place] [address] Oniel (oncoming nurse) by Ilia Ramirez (offgoing nurse). Report included the following information SBAR, Intake/Output, MAR, Recent Results, Med Rec Status and Cardiac Rhythm NSR.    0700: Pt had uneventful shift with no complaints of pain. 0730: Bedside shift change report given to Junior (oncoming nurse) by [address] [place] [address] Oniel (offgoing nurse). Report included the following information SBAR, Intake/Output, MAR, Recent Results, Med Rec Status and Cardiac Rhythm NSR. Problem: Cath Lab Procedures: Post-Cath Day 1  Goal: Activity/Safety  Outcome: Progressing Towards Goal  Pt up ad helen. Problem: Falls - Risk of  Goal: *Absence of Falls  Document Oswaldo Fall Risk and appropriate interventions in the flowsheet.    Outcome: Progressing Towards Goal  Fall Risk Interventions:  Mobility Interventions: OT consult for ADLs, Patient to call before getting OOB, PT Consult for mobility concerns         Medication Interventions: Assess postural VS orthostatic hypotension, Patient to call before getting OOB, Teach patient to arise slowly

## 2018-05-01 NOTE — PROGRESS NOTES
6093  IV and Telemetry discountinued    1005 Pt discharge instructions reviewed with patient and spouse, awaiting volunteer transport. 21  I have reviewed discharge instructions with the patient and spouse. The patient and spouse verbalized understanding.

## 2018-05-01 NOTE — PROGRESS NOTES
1307: Faculty or Preceptor Review of RN ORIENTEE\"S Work    5/1/2018  - Shift times - 0730 to 1020    The rn orientee's documentation of patient care for Zachary Gonzalez has been reviewed and approved. All medications have been administered under the direct supervision of the preceptor.     Juan David Lala RN

## 2018-05-01 NOTE — CARDIO/PULMONARY
Cardiac Rehab: MI education folder, with catheterization brochure, to bedside of Sarkis Flores. Educated using teach back method. Reviewed MI diagnosis definition and purpose of intervention. Discussed risk factors for CAD to include the following: elevated BMI, hyperlipidemia, hypertension. He is a non smoker. Reviewed the importance of medication compliance, the purpose of plavix, and potential side effects. Handout on plavix given. Discussed follow up appointments with cardiologist, signs and symptoms of angina, and what to report to physician after discharge. He will see Dr. Sundeep Ruiz.  Emphasized the value of cardiac rehab. Discussed Cardiac Rehab Program format, benefits, and encouraged enrollment to assist with risk modification and management. Initial Cardiac Rehab Program intake appointment scheduled for 6/4/2018 at 3pm  and appointment information is on the AVS.    Sarkis Flores verbalized understanding with questions answered.   Herma Fothergill, RN

## 2018-05-02 ENCOUNTER — HOME CARE VISIT (OUTPATIENT)
Dept: HOME HEALTH SERVICES | Facility: HOME HEALTH | Age: 68
End: 2018-05-02

## 2018-05-02 ENCOUNTER — PATIENT OUTREACH (OUTPATIENT)
Dept: CARDIOLOGY CLINIC | Age: 68
End: 2018-05-02

## 2018-05-02 NOTE — PROGRESS NOTES
Hospital Discharge Follow-Up      Date/Time:  2018 4:26 PM    Patient was admitted to [facility] on  and discharged on  for unstable/stuttering angina; NSTEMI, cath / stenting LCX. . The physician discharge summary was available at the time of outreach. Patient was contacted within 1 business days of discharge. Call to and reached Mr. Alejandro Mcleod - and discussed his hospital visit and discharge - CAD - with multiple artery issues - discussed atherosclerosis and lifestyle modification - and medication therapy - He is eager to start cardiac rehab - Post cath follow up on  with Dr. Corrie Graff - diet modification and walking discussed (avoid hot temperatures). Top Challenges reviewed with the provider   CAD/ NSTEMI/ cath and stenting  Atherosclerosis - medication tx and lifestyle modification- cardiac rehab pending  Alcohol abuse - needs much reduction or abstinence. Hypothyroid - with tx. Method of communication with provider :staff message    Inpatient RRAT score: 8  Was this a readmission? no   Patient stated reason for the readmission: n/a    Nurse Navigator (NN) contacted the patient by telephone to perform post hospital discharge assessment. Verified name and  with patient as identifiers. Provided introduction to self, and explanation of the Nurse Navigator role. Reviewed discharge instructions and red flags with patient who verbalized understanding. Patient given an opportunity to ask questions and does not have any further questions or concerns at this time. The patient agrees to contact the PCP office for questions related to their healthcare. NN provided contact information for future reference. Assessment and Plan      1. NSTEMI; Troponin 0.38.  12 lead EKG:  NSR with incomplete LBBB  -Add BB if chest pain continues after above interventions. (unable to add d/t low heart rate)  -Add statin- started on Lipitor 40 mg   -[facility] today with Dr. Judd Torres at 3:45 PM.     2. Hx of HTN:  BP elevated  -Continue home meds:  Losartan-HCTZ, amlopdine 5 mg daily   3. Hx of HLD:  Will check lipids in A. M.   4. Hx of heavy ETOH use:   CIWA protocol after [facility].   5.. Hx of Hypothyroism:  Continue synthroid.     Home Health orders at discharge: [address]: Jimbo Caputo  Date of initial visit: appt for 5/4/18    Durable Medical Equipment ordered/company: none  Durable Medical Equipment received: n/a  Cardiac rehab referral - intake appt 6/4    Barriers to care? Hx alcohol abuse - discussed with pt by provider in hospital    Advance Care Planning:   Does patient have an Advance Directive:  not on file     Medication:     New Medications at Discharge: Lipitor 40 mg hs, Plavix 75 mg every day, NTG prn  Changed Medications at Discharge: none  Discontinued Medications at Discharge: none    Medication reconciliation was performed with patient, who verbalizes understanding of administration of home medications. There were no barriers to obtaining medications identified at this time. Referral to Pharm D needed: no   PCP/Specialist follow up: Future Appointments  Date Time Provider Scotty Henderson   5/4/2018 To Be Determined Anel Lockett RN [facility] [place] [facility]   5/8/2018 11:00 AM Alysa Hurtado  [address]    6/4/2018 3:00 PM Nick Adames RN [facility]     __________________________________________________________________________________________  Patient: Kong Ribera  MRN: [id number]  SSN: [id number]   YOB: 1950 Age: 75 y.o. Sex: male    Date of Procedure: 4/30/2018   Pre-procedure Diagnosis: NSTEMI  Post-procedure Diagnosis: Coronary Artery Disease  Procedure: coronaries, rohini to mid lcx. No left heart or lv gram  :  Dr. Irena Rivera MD     Findings: left main ok. Lad 50% mid after d2, second tubular lesion after d2. lcx tubular ostial 40%, 50% discrete mid lcx after om2, followed by discrete 99%.  60% tubular distal lcx between om2 and 3. rca is large, scattered irregs with 50% mid pda. 2.25 by 12mm rohini to 9atm, at 99% mid lcx.  Excellent result  Signed by:  Michelle Luna MD  4/30/2018  5:08 PM  ____________________________________________________________________________________________  Balwinder Limon RN , [address], CCM   [facility]  [phone]

## 2018-05-04 ENCOUNTER — HOME CARE VISIT (OUTPATIENT)
Dept: SCHEDULING | Facility: HOME HEALTH | Age: 68
End: 2018-05-04

## 2018-05-04 PROCEDURE — G0495 RN CARE TRAIN/EDU IN HH: HCPCS

## 2018-05-08 ENCOUNTER — OFFICE VISIT (OUTPATIENT)
Dept: CARDIOLOGY CLINIC | Age: 68
End: 2018-05-08

## 2018-05-08 VITALS
BODY MASS INDEX: 30.21 KG/M2 | OXYGEN SATURATION: 98 % | HEART RATE: 60 BPM | WEIGHT: 204 LBS | HEIGHT: 69 IN | DIASTOLIC BLOOD PRESSURE: 90 MMHG | SYSTOLIC BLOOD PRESSURE: 138 MMHG | RESPIRATION RATE: 16 BRPM

## 2018-05-08 DIAGNOSIS — I25.2 HX OF MYOCARDIAL INFARCTION: ICD-10-CM

## 2018-05-08 DIAGNOSIS — E78.00 HYPERCHOLESTEREMIA: ICD-10-CM

## 2018-05-08 DIAGNOSIS — I25.10 CORONARY ARTERY DISEASE INVOLVING NATIVE CORONARY ARTERY OF NATIVE HEART WITHOUT ANGINA PECTORIS: Primary | ICD-10-CM

## 2018-05-08 RX ORDER — BISMUTH SUBSALICYLATE 262 MG
1 TABLET,CHEWABLE ORAL DAILY
COMMUNITY

## 2018-05-08 RX ORDER — ROSUVASTATIN CALCIUM 40 MG/1
40 TABLET, COATED ORAL
Qty: 90 TAB | Refills: 1 | Status: SHIPPED | OUTPATIENT
Start: 2018-05-08 | End: 2018-10-29 | Stop reason: SDUPTHER

## 2018-05-08 NOTE — PROGRESS NOTES
Chief Complaint   Patient presents with    Coronary [address] [facility] [address] follow up. Denies chest pain/shortness of breath/dizziness (since Friday)/swelling. Complaints of muscle pain in back.

## 2018-05-08 NOTE — Clinical Note
Citlaly Bond     0/70/8160       [facility], [facility] Date of Visit-5/8/2018 PCP is Racquel Bond MD  
[facility] [place] [facility] HPI:  Citlaly Bond is a 75 y.o. male Subjective:  Patient was on my service from March 30th to May 1st with an NSTEMI and coronary artery disease, with left heart cath LAD, 50% stenosis in the mid portion of the second diagonal, circumflex *** ostial 40% stenosis and *** 50% stenosis in the mid circumflex OM2, and then he had further 99% stenosis. The RCA was *** irregularities. In the mid circ he underwent KELLY. He had initial troponin of 0.38. He had diffuse moderate disease with a focal lesion in the circ, which was causing the chest pain. He has a history of hypertension, previous heavy use of alcohol and hypothyroidism. He returns today, says he feels much better. Has not had recurrent chest pain. Tolerating medications fine. His previous EKG had shown sinus bradycardia. Denies chest pain, shortness of breath, and swelling. He's going to start rehab on June 4th. He wants to know if he can use Voltaren cream.  He complains of muscle pain in his back a little bit, which is why he wants to use it. His blood pressure readings at home are fair. He's been doing well since Friday. He asked about rehab. He asked about sodium as the nurses scared him from home health. I told him he could take a moderate amount. He sees Dr. Rufina Pitt on Friday. BP here they got Friday was 112/65. Impression/Plan: 1. CAD, S/P recent NSTEMI and stents of the circumflex with diffuse disease. Needs continued medical therapy. 2. Dyslipidemia. High potency statin recommended. 3. Hypertension. Continue blood pressure diary. 4. Start cardiac rehab. EKG:  
 
Assessment/Plan:    
 
Future Appointments Date Time Provider Scotty Henderson 6/4/2018 3:00 PM Malick Patton RN [place] [id number]  
 6/5/2018 10:30 AM Gaby Tovar  [facility] [place] [address] H  
9/6/2018 9:40 AM Lea Santos  [address] [facility] CAD CHF Meds   
    
  
 rosuvastatin (CRESTOR) 40 mg tablet  (Taking) Take 1 Tab by mouth nightly. clopidogrel (PLAVIX) 75 mg tab  (Taking) Take 1 Tab by mouth daily. nitroglycerin (NITROSTAT) 0.4 mg SL tablet  (Taking) 1 Tab by SubLINGual route every five (5) minutes as needed for Chest Pain. aspirin delayed-release 81 mg tablet  (Taking) Take 81 mg by mouth daily. losartan-hydroCHLOROthiazide (HYZAAR) 100-25 mg per tablet  (Taking) Take 1 Tab by mouth daily. amLODIPine (NORVASC) 5 mg tablet  (Taking) Take 5 mg by mouth daily. Impression: 1. Coronary artery disease involving native coronary artery of native heart without angina pectoris 2. Hypercholesteremia 3. Hx of myocardial infarction Cardiac History: No specialty comments available. ROS-except as noted above. . A complete cardiac and respiratory are reviewed and negative except as above ; Resp-denies wheezing  or productive cough,. Const- No unusual weight loss or fever; Neuro-no recent seizure or CVA ; GI- No BRBPR, abdom pain, bloating ; - no  hematuria  
see supplement sheet, initialed and to be scanned by staff Past Medical History:  
Diagnosis Date  Hypertension Social Hx= reports that he has never smoked. He has never used smokeless tobacco. He reports that he drinks about 3.6 oz of alcohol per week  He reports that he does not use illicit drugs. Exam and Labs:  /90 (BP 1 Location: Right arm, BP Patient Position: Sitting)  Pulse 60  Resp 16  Ht 5' 9\" (1.753 m)  Wt 204 lb (92.5 kg)  SpO2 98%  BMI 30.13 kg/[id number]:  NAD, comfortable Head: NC,AT. Eyes: No scleral icterus. Neck:  Neck supple. No JVD present. Throat: moist mucous membranes. Chest: Effort normal & normal respiratory excursion . Neurological: alert, conversant and oriented . Skin: Skin is not cold. No obvious systemic rash noted. Not diaphoretic. No erythema. Psychiatric:  Grossly normal mood and affect. Behavior appears normal. Extremities:  no clubbing or cyanosis. Abdomen: non distended Lungs:breath sounds normal. No stridor. distress, wheezes or  Rales. Heart:    normal rate, regular rhythm, normal S1, S2, no murmurs, rubs, clicks or gallops , PMI non displaced. Edema: Edema is none. Lab Results Component Value Date/Time Cholesterol, total 169 05/01/2018 03:25 AM  
 HDL Cholesterol 82 05/01/2018 03:25 AM  
 LDL, calculated 69 05/01/2018 03:25 AM  
 Triglyceride 90 05/01/2018 03:25 AM  
 CHOL/HDL Ratio 2.1 05/01/2018 03:25 AM  
 
Lab Results Component Value Date/Time Sodium 136 04/30/2018 12:59 PM  
 Potassium 3.8 04/30/2018 12:59 PM  
 Chloride 101 04/30/2018 12:59 PM  
 CO2 28 04/30/2018 12:59 PM  
 Anion gap 7 04/30/2018 12:59 PM  
 Glucose 109 (H) 04/30/2018 12:59 PM  
 BUN 20 04/30/2018 12:59 PM  
 Creatinine 1.05 04/30/2018 12:59 PM  
 BUN/Creatinine ratio 19 04/30/2018 12:59 PM  
 GFR est AA >60 04/30/2018 12:59 PM  
 GFR est non-AA >60 04/30/2018 12:59 PM  
 Calcium 9.3 04/30/2018 12:59 PM  
  
Wt Readings from Last 3 Encounters:  
05/08/18 204 lb (92.5 kg) 05/01/18 208 lb 8.9 oz (94.6 kg) BP Readings from Last 3 Encounters:  
05/08/18 138/90  
05/01/18 150/80 Current Outpatient Prescriptions Medication Sig  
 multivitamin (ONE A DAY) tablet Take 1 Tab by mouth daily.  rosuvastatin (CRESTOR) 40 mg tablet Take 1 Tab by mouth nightly.  clopidogrel (PLAVIX) 75 mg tab Take 1 Tab by mouth daily.  nitroglycerin (NITROSTAT) 0.4 mg SL tablet 1 Tab by SubLINGual route every five (5) minutes as needed for Chest Pain.  aspirin delayed-release 81 mg tablet Take 81 mg by mouth daily.  losartan-hydroCHLOROthiazide (HYZAAR) 100-25 mg per tablet Take 1 Tab by mouth daily.   
 levothyroxine (SYNTHROID) 100 mcg tablet Take 100 mcg by mouth Daily (before breakfast).  amLODIPine (NORVASC) 5 mg tablet Take 5 mg by mouth daily.  meloxicam (MOBIC) 15 mg tablet Take 15 mg by mouth daily as needed for Pain.  diclofenac (VOLTAREN) 1 % gel Apply 2-4 g to affected area four (4) times daily as needed (joint pain).  valACYclovir (VALTREX) 1 gram tablet Take 1,000 mg by mouth as needed.  zolpidem (AMBIEN) 10 mg tablet Take 10 mg by mouth nightly as needed for Sleep. No current facility-administered medications for this visit. Impression see above.

## 2018-05-08 NOTE — MR AVS SNAPSHOT
[address] [facility] [address] [facility] 
[phone] Patient: Joaquín West MRN: [id number] BEW:[birth year] Visit Information Date & Time Provider Department Dept. Phone Encounter #  
 5/8/2018 11:00 AM Jhony Way MD [facility] [phone] [id number] Your Appointments 5/8/2018 11:00 AM  
ESTABLISHED PATIENT with Jhony Way MD  
[facility] (SHENA SCHEDULING) Appt Note: appt schd by Bohdan Saint, NP hosp f/u NSTEMI [address] [place] [address]  
[address] [facility] [id number] Upcoming Health Maintenance Date Due Hepatitis C Screening 1950 DTaP/Tdap/Td series (1 - Tdap) 8/19/1970 FOBT Q 1 YEAR AGE 50-75 8/19/1999 ZOSTER VACCINE AGE 60> 6/19/2009 GLAUCOMA SCREENING Q2Y 8/19/2014 Pneumococcal 65+ Low/Medium Risk (1 of 2 - PCV13) 8/19/2014 MEDICARE YEARLY EXAM 5/1/2018 Influenza Age 5 to Adult 8/1/2018 Allergies as of 5/8/2018  Review Complete On: 4/30/2018 By: Jahaira Newberry RN No Known Allergies Current Immunizations  Never Reviewed No immunizations on file. Not reviewed this visit Vitals Smoking Status Never Smoker Your Updated Medication List  
  
   
This list is accurate as of 5/1/18  6:24 PM.  Always use your most recent med list. amLODIPine 5 mg tablet Commonly known as:  [place] Shield Take 5 mg by mouth daily. aspirin delayed-release 81 mg tablet Take 81 mg by mouth daily. atorvastatin 40 mg tablet Commonly known as:  LIPITOR Take 1 Tab by mouth nightly. clopidogrel 75 mg Tab Commonly known as:  PLAVIX Take 1 Tab by mouth daily. diclofenac 1 % Gel Commonly known as:  VOLTAREN  
 Apply 2-4 g to affected area four (4) times daily as needed (joint pain). levothyroxine 100 mcg tablet Commonly known as:  SYNTHROID Take 100 mcg by mouth Daily (before breakfast). losartan-hydroCHLOROthiazide 100-25 mg per tablet Commonly known as:  HYZAAR Take 1 Tab by mouth daily. meloxicam 15 mg tablet Commonly known as:  MOBIC Take 15 mg by mouth daily as needed for Pain. nitroglycerin 0.4 mg SL tablet Commonly known as:  NITROSTAT  
1 Tab by SubLINGual route every five (5) minutes as needed for Chest Pain. VALTREX 1 gram tablet Generic drug:  valACYclovir Take 1,000 mg by mouth as needed. zolpidem 10 mg tablet Commonly known as:  AMBIEN Take 10 mg by mouth nightly as needed for Sleep. To-Do List   
 06/04/2018 3:00 PM  
  Appointment with Regi Wilson RN at [address] [place] [address] ([phone]) [facility]! Dear Beto Enter: Thank you for requesting a [employer] account. Our records indicate that you already have an active [employer] account. You can access your account anytime at [email or web address] Did you know that you can access your hospital and ER discharge instructions at any time in [employer]? You can also review all of your test results from your hospital stay or ER visit. Additional Information If you have questions, please visit the Frequently Asked Questions section of the [employer] website at [email or web address] Remember, [employer] is NOT to be used for urgent needs. For medical emergencies, dial 911. Now available from your iPhone and Android! Please provide this summary of care documentation to your next provider. Your primary care clinician is listed as Citlaly Calabrese. If you have any questions after today's visit, please call [phone].

## 2018-05-09 NOTE — PROGRESS NOTES
Tatianna Ingram     [birth year]       Kamille Palacios MD, [facility]  Date of Visit-5/8/2018   PCP is Citlaly Calabrese MD   [facility] [place]  [facility]  HPI:  Tatianna Ingram is a 75 y.o. male    Dictation on: 05/20/2018  3:24 PM by: Ryan Vasquez [[id number]]         EKG:     Assessment/Plan:       Future Appointments  Date Time Provider Scotty Henderson   6/4/2018 3:00 PM Regi Wilson  [address], [address] H   6/5/2018 10:30 AM Gaby Tovar  [address], [address] H   9/6/2018 9:40 AM MD SANDEE Pantoja [facility]        [place] CAD CHF Meds             rosuvastatin (CRESTOR) 40 mg tablet  (Taking) Take 1 Tab by mouth nightly. clopidogrel (PLAVIX) 75 mg tab  (Taking) Take 1 Tab by mouth daily. nitroglycerin (NITROSTAT) 0.4 mg SL tablet  (Taking) 1 Tab by SubLINGual route every five (5) minutes as needed for Chest Pain. aspirin delayed-release 81 mg tablet  (Taking) Take 81 mg by mouth daily. losartan-hydroCHLOROthiazide (HYZAAR) 100-25 mg per tablet  (Taking) Take 1 Tab by mouth daily. amLODIPine (NORVASC) 5 mg tablet  (Taking) Take 5 mg by mouth daily. Impression:   1. Coronary artery disease involving native coronary artery of native heart without angina pectoris    2. Hypercholesteremia    3. Hx of myocardial infarction       Cardiac History:   No specialty comments available. ROS-except as noted above. . A complete cardiac and respiratory are reviewed and negative except as above ; Resp-denies wheezing  or productive cough,. Const- No unusual weight loss or fever; Neuro-no recent seizure or CVA ; GI- No BRBPR, abdom pain, bloating ; - no  hematuria   see supplement sheet, initialed and to be scanned by staff  Past Medical History:   Diagnosis Date    Hypertension       Social Hx= reports that he has never smoked.  He has never used smokeless tobacco. He reports that he drinks about 3.6 oz of alcohol per week  He reports that he does not use illicit drugs. Exam and Labs:  /90 (BP 1 Location: Right arm, BP Patient Position: Sitting)  Pulse 60  Resp 16  Ht 5' 9\" (1.753 m)  Wt 204 lb (92.5 kg)  SpO2 98%  BMI 30.13 kg/i1Qzswcbijdzxwrv:  NAD, comfortable  Head: NC,AT. Eyes: No scleral icterus. Neck:  Neck supple. No JVD present. Throat: moist mucous membranes. Chest: Effort normal & normal respiratory excursion . Neurological: alert, conversant and oriented . Skin: Skin is not cold. No obvious systemic rash noted. Not diaphoretic. No erythema. Psychiatric:  Grossly normal mood and affect. Behavior appears normal. Extremities:  no clubbing or cyanosis. Abdomen: non distended    Lungs:breath sounds normal. No stridor. distress, wheezes or  Rales. Heart:    normal rate, regular rhythm, normal S1, S2, no murmurs, rubs, clicks or gallops , PMI non displaced. Edema: Edema is none. Lab Results   Component Value Date/Time    Cholesterol, total 169 05/01/2018 03:25 AM    HDL Cholesterol 82 05/01/2018 03:25 AM    LDL, calculated 69 05/01/2018 03:25 AM    Triglyceride 90 05/01/2018 03:25 AM    CHOL/HDL Ratio 2.1 05/01/2018 03:25 AM     Lab Results   Component Value Date/Time    Sodium 136 04/30/2018 12:59 PM    Potassium 3.8 04/30/2018 12:59 PM    Chloride 101 04/30/2018 12:59 PM    CO2 28 04/30/2018 12:59 PM    Anion gap 7 04/30/2018 12:59 PM    Glucose 109 (H) 04/30/2018 12:59 PM    BUN 20 04/30/2018 12:59 PM    Creatinine 1.05 04/30/2018 12:59 PM    BUN/Creatinine ratio 19 04/30/2018 12:59 PM    GFR est AA >60 04/30/2018 12:59 PM    GFR est non-AA >60 04/30/2018 12:59 PM    Calcium 9.3 04/30/2018 12:59 PM      Wt Readings from Last 3 Encounters:   05/08/18 204 lb (92.5 kg)   05/01/18 208 lb 8.9 oz (94.6 kg)      BP Readings from Last 3 Encounters:   05/08/18 138/90   05/01/18 150/80      Current Outpatient Prescriptions   Medication Sig    multivitamin (ONE A DAY) tablet Take 1 Tab by mouth daily.     rosuvastatin (CRESTOR) 40 mg tablet Take 1 Tab by mouth nightly.  clopidogrel (PLAVIX) 75 mg tab Take 1 Tab by mouth daily.  nitroglycerin (NITROSTAT) 0.4 mg SL tablet 1 Tab by SubLINGual route every five (5) minutes as needed for Chest Pain.  aspirin delayed-release 81 mg tablet Take 81 mg by mouth daily.  losartan-hydroCHLOROthiazide (HYZAAR) 100-25 mg per tablet Take 1 Tab by mouth daily.  levothyroxine (SYNTHROID) 100 mcg tablet Take 100 mcg by mouth Daily (before breakfast).  amLODIPine (NORVASC) 5 mg tablet Take 5 mg by mouth daily.  meloxicam (MOBIC) 15 mg tablet Take 15 mg by mouth daily as needed for Pain.  diclofenac (VOLTAREN) 1 % gel Apply 2-4 g to affected area four (4) times daily as needed (joint pain).  valACYclovir (VALTREX) 1 gram tablet Take 1,000 mg by mouth as needed.  zolpidem (AMBIEN) 10 mg tablet Take 10 mg by mouth nightly as needed for Sleep. No current facility-administered medications for this visit. Impression see above.

## 2018-05-15 ENCOUNTER — TELEPHONE (OUTPATIENT)
Dept: CARDIOLOGY CLINIC | Age: 68
End: 2018-05-15

## 2018-05-15 NOTE — TELEPHONE ENCOUNTER
Patient would like a return call regarding his change in blood pressure medication. He can be reached at [phone].  Thank you

## 2018-05-15 NOTE — TELEPHONE ENCOUNTER
Verified patient with two types of identifiers. Patient reported that over the weekend he was feeling very tired and exhausted. States that BP felt low. Patient stopped taking amlodipine several days ago and reports BP in 110-120 range. Instructed patient to continue to take BP with diary and report back in a few days with current medications and BP readings. Patient will update office via [address] Verbalizes understanding. And will call with any questions or concerns.

## 2018-05-20 NOTE — PROGRESS NOTES
Subjective:  Patient was on my service from March 30th to May 1st with an NSTEMI and coronary artery disease, with left heart cath LAD, 50% stenosis in the mid portion of the second diagonal, circumflex *** ostial 40% stenosis and *** 50% stenosis in the mid circumflex OM2, and then he had further 99% stenosis. The RCA was *** irregularities. In the mid circ he underwent KELLY. He had initial troponin of 0.38. He had diffuse moderate disease with a focal lesion in the circ, which was causing the chest pain. He has a history of hypertension, previous heavy use of alcohol and hypothyroidism. He returns today, says he feels much better. Has not had recurrent chest pain. Tolerating medications fine. His previous EKG had shown sinus bradycardia. Denies chest pain, shortness of breath, and swelling. He's going to start rehab on June 4th. He wants to know if he can use Voltaren cream.  He complains of muscle pain in his back a little bit, which is why he wants to use it. His blood pressure readings at home are fair. He's been doing well since Friday. He asked about rehab. He asked about sodium as the nurses scared him from home health. I told him he could take a moderate amount. He sees Dr. John Bergeron on Friday. BP here they got Friday was 112/65. Impression/Plan:  1. CAD, S/P recent NSTEMI and stents of the circumflex with diffuse disease. Needs continued medical therapy. 2. Dyslipidemia. High potency statin recommended. 3. Hypertension. Continue blood pressure diary. 4. Start cardiac rehab.

## 2018-05-21 ENCOUNTER — TELEPHONE (OUTPATIENT)
Dept: CARDIOLOGY CLINIC | Age: 68
End: 2018-05-21

## 2018-05-21 NOTE — TELEPHONE ENCOUNTER
----- Message from Jovany Mclain LPN sent at 3/78/1208  8:21 AM EDT -----  Regarding: FW: Update Medical Information  Contact: [phone]      ----- Message -----     From: Joaquín West     Sent: 5/21/2018   7:35 AM       To: Jsoey Ragland  Subject: Update Medical Information                       Batsheva,  Per your request here are the results of my blood pressure measurements before and after I stopped taking Amlodipine because of low blood pressure. May 7 125/71  63  May 8  143/76 61  May 9  115/67  67  May 10 121/68 58  May 11 114/67 70  May 12 ---------  May 13  96/62 64 May14   89/71 62 Stopped taking Amlodipine  May 15  118/63 61  May 16 115/72 69  May 17  117/65 78  May 18  121/72 55  May 19  111/59 59  May 20  112/60 76  May 21  126/77 66    The number to the right is pulse.   Thanks,  Zacarias Sousa  [phone]

## 2018-05-23 NOTE — TELEPHONE ENCOUNTER
Good results with stopping amlodipine and stays normal bp  If feeling well , keep fu  Future Appointments  Date Time Provider Scotty Beatriz   5/30/2018 3:00 PM [facility]   5/30/2018 4:00 PM [facility] 9/6/2018 9:40 AM Brenda Fatima  [address]

## 2018-05-29 ENCOUNTER — TELEPHONE (OUTPATIENT)
Dept: CARDIAC REHAB | Age: 68
End: 2018-05-29

## 2018-05-29 NOTE — TELEPHONE ENCOUNTER
5/29/2018 Cardiac Rehab: Called Mr. Floresita Hill to remind of intake appointment on Wednesday, May 30, 2018. Left voicemail message. Provided patient with contact information for [facility]. Also, reminded patient to bring a list of current medications, a personal schedule, and to wear comfortable clothes and shoes.  Baldomero Skelton

## 2018-05-30 ENCOUNTER — HOSPITAL ENCOUNTER (OUTPATIENT)
Dept: CARDIAC REHAB | Age: 68
Discharge: HOME | End: 2018-05-30
Payer: MEDICARE

## 2018-05-30 VITALS — BODY MASS INDEX: 29.53 KG/M2 | WEIGHT: 200 LBS

## 2018-05-30 VITALS — HEIGHT: 69 IN | WEIGHT: 200 LBS | BODY MASS INDEX: 29.62 KG/M2

## 2018-05-30 PROCEDURE — 93798 PHYS/QHP OP CAR RHAB W/ECG: CPT

## 2018-05-30 NOTE — CARDIO/PULMONARY
Floresita Hill  75 y.o.  presented to cardiac wellness for orientation and exercise tolerance test today with a primary diagnosis of NSTEMI, s/p stent to mid circ . Floresita Hill did not have a cardiac history previously. Cardiac risk factors include hypertension, thyroid dysfunction and these were reviewed with [facility]. Floresita Hill is  and has 2 daughters. He recently moved from [place] to [place] and now lives close to his daughter and her family. He is a , who continues to work. His PHQ-9 was 3, which is considered normal for depression screener. Patient denied chest pain or SOB during 6 minute walk and was in SB/SR with rare PVC. Floresita Hill will attend a 60 minute class once a week and exercise 1-2 days a week in cardiac wellness. Education manual given and reviewed. EF is 55-60%.      Luisa Tovar RN  5/30/2018

## 2018-06-03 ENCOUNTER — HOSPITAL ENCOUNTER (EMERGENCY)
Age: 68
Discharge: HOME | End: 2018-06-03
Attending: EMERGENCY MEDICINE | Admitting: EMERGENCY MEDICINE
Payer: MEDICARE

## 2018-06-03 ENCOUNTER — APPOINTMENT (OUTPATIENT)
Dept: GENERAL RADIOLOGY | Age: 68
End: 2018-06-03
Attending: NURSE PRACTITIONER
Payer: MEDICARE

## 2018-06-03 ENCOUNTER — APPOINTMENT (OUTPATIENT)
Dept: CT IMAGING | Age: 68
End: 2018-06-03
Attending: NURSE PRACTITIONER
Payer: MEDICARE

## 2018-06-03 VITALS
OXYGEN SATURATION: 100 % | HEIGHT: 69 IN | WEIGHT: 202.38 LBS | DIASTOLIC BLOOD PRESSURE: 72 MMHG | HEART RATE: 60 BPM | SYSTOLIC BLOOD PRESSURE: 131 MMHG | RESPIRATION RATE: 16 BRPM | BODY MASS INDEX: 29.98 KG/M2 | TEMPERATURE: 98 F
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
## 2018-06-03 DIAGNOSIS — S06.0X1A CONCUSSION WITH LOSS OF CONSCIOUSNESS OF 30 MINUTES OR LESS, INITIAL ENCOUNTER: ICD-10-CM

## 2018-06-03 DIAGNOSIS — S51.011A LACERATION OF RIGHT ELBOW, INITIAL ENCOUNTER: ICD-10-CM

## 2018-06-03 DIAGNOSIS — J01.90 ACUTE SINUSITIS, RECURRENCE NOT SPECIFIED, UNSPECIFIED LOCATION: ICD-10-CM

## 2018-06-03 DIAGNOSIS — R05.9 COUGH: ICD-10-CM

## 2018-06-03 LAB
ALBUMIN SERPL-MCNC: 3.7 G/DL (ref 3.5–5)
ALBUMIN/GLOB SERPL: 1.1 {RATIO} (ref 1.1–2.2)
ALP SERPL-CCNC: 78 U/L (ref 45–117)
ALT SERPL-CCNC: 30 U/L (ref 12–78)
ANION GAP SERPL CALC-SCNC: 8 MMOL/L (ref 5–15)
AST SERPL-CCNC: 25 U/L (ref 15–37)
BASOPHILS # BLD: 0 K/UL (ref 0–0.1)
BASOPHILS NFR BLD: 0 % (ref 0–1)
BILIRUB SERPL-MCNC: 0.5 MG/DL (ref 0.2–1)
BUN SERPL-MCNC: 13 MG/DL (ref 6–20)
BUN/CREAT SERPL: 12 (ref 12–20)
CALCIUM SERPL-MCNC: 8.7 MG/DL (ref 8.5–10.1)
CHLORIDE SERPL-SCNC: 99 MMOL/L (ref 97–108)
CK SERPL-CCNC: 179 U/L (ref 39–308)
CO2 SERPL-SCNC: 30 MMOL/L (ref 21–32)
CREAT SERPL-MCNC: 1.05 MG/DL (ref 0.7–1.3)
DIFFERENTIAL METHOD BLD: NORMAL
EOSINOPHIL # BLD: 0.1 K/UL (ref 0–0.4)
EOSINOPHIL NFR BLD: 3 % (ref 0–7)
ERYTHROCYTE [DISTWIDTH] IN BLOOD BY AUTOMATED COUNT: 11.8 % (ref 11.5–14.5)
GLOBULIN SER CALC-MCNC: 3.4 G/DL (ref 2–4)
GLUCOSE SERPL-MCNC: 139 MG/DL (ref 65–100)
HCT VFR BLD AUTO: 39.5 % (ref 36.6–50.3)
HGB BLD-MCNC: 13.3 G/DL (ref 12.1–17)
IMM GRANULOCYTES # BLD: 0 K/UL (ref 0–0.04)
IMM GRANULOCYTES NFR BLD AUTO: 0 % (ref 0–0.5)
LYMPHOCYTES # BLD: 1.1 K/UL (ref 0.8–3.5)
LYMPHOCYTES NFR BLD: 24 % (ref 12–49)
MCH RBC QN AUTO: 32 PG (ref 26–34)
MCHC RBC AUTO-ENTMCNC: 33.7 G/DL (ref 30–36.5)
MCV RBC AUTO: 95.2 FL (ref 80–99)
MONOCYTES # BLD: 0.4 K/UL (ref 0–1)
MONOCYTES NFR BLD: 9 % (ref 5–13)
NEUTS SEG # BLD: 2.8 K/UL (ref 1.8–8)
NEUTS SEG NFR BLD: 63 % (ref 32–75)
NRBC # BLD: 0 K/UL (ref 0–0.01)
NRBC BLD-RTO: 0 PER 100 WBC
PLATELET # BLD AUTO: 171 K/UL (ref 150–400)
PMV BLD AUTO: 10.1 FL (ref 8.9–12.9)
POTASSIUM SERPL-SCNC: 3.3 MMOL/L (ref 3.5–5.1)
PROT SERPL-MCNC: 7.1 G/DL (ref 6.4–8.2)
RBC # BLD AUTO: 4.15 M/UL (ref 4.1–5.7)
SODIUM SERPL-SCNC: 137 MMOL/L (ref 136–145)
TROPONIN I SERPL-MCNC: <0.04 NG/ML
WBC # BLD AUTO: 4.5 K/UL (ref 4.1–11.1)

## 2018-06-03 PROCEDURE — 80053 COMPREHEN METABOLIC PANEL: CPT | Performed by: EMERGENCY MEDICINE

## 2018-06-03 PROCEDURE — 90471 IMMUNIZATION ADMIN: CPT

## 2018-06-03 PROCEDURE — 74011250636 HC RX REV CODE- 250/636: Performed by: NURSE PRACTITIONER

## 2018-06-03 PROCEDURE — 70450 CT HEAD/BRAIN W/O DYE: CPT

## 2018-06-03 PROCEDURE — 99284 EMERGENCY DEPT VISIT MOD MDM: CPT

## 2018-06-03 PROCEDURE — 93005 ELECTROCARDIOGRAM TRACING: CPT

## 2018-06-03 PROCEDURE — 73080 X-RAY EXAM OF ELBOW: CPT

## 2018-06-03 PROCEDURE — 72125 CT NECK SPINE W/O DYE: CPT

## 2018-06-03 PROCEDURE — 71046 X-RAY EXAM CHEST 2 VIEWS: CPT

## 2018-06-03 PROCEDURE — 36415 COLL VENOUS BLD VENIPUNCTURE: CPT | Performed by: EMERGENCY MEDICINE

## 2018-06-03 PROCEDURE — 85025 COMPLETE CBC W/AUTO DIFF WBC: CPT | Performed by: EMERGENCY MEDICINE

## 2018-06-03 PROCEDURE — 84484 ASSAY OF TROPONIN QUANT: CPT | Performed by: EMERGENCY MEDICINE

## 2018-06-03 PROCEDURE — 82550 ASSAY OF CK (CPK): CPT | Performed by: EMERGENCY MEDICINE

## 2018-06-03 PROCEDURE — 90715 TDAP VACCINE 7 YRS/> IM: CPT | Performed by: NURSE PRACTITIONER

## 2018-06-03 RX ORDER — AMOXICILLIN AND CLAVULANATE POTASSIUM 875; 125 MG/1; MG/1
1 TABLET, FILM COATED ORAL 2 TIMES DAILY
Qty: 14 TAB | Refills: 0 | Status: SHIPPED | OUTPATIENT
Start: 2018-06-03 | End: 2018-06-10

## 2018-06-03 RX ADMIN — TETANUS TOXOID, REDUCED DIPHTHERIA TOXOID AND ACELLULAR PERTUSSIS VACCINE, ADSORBED 0.5 ML: 5; 2.5; 8; 8; 2.5 SUSPENSION INTRAMUSCULAR at 14:59

## 2018-06-03 NOTE — ED NOTES
Pt discharged in stable condition at this time. Pt denies any needs or questions at this time. Pt ambulatory to exit with daughter.

## 2018-06-03 NOTE — DISCHARGE INSTRUCTIONS
Concussion: Care Instructions  Your Care Instructions    A concussion is a kind of injury to the brain. It happens when the head receives a hard blow. The impact can jar or shake the brain against the skull. This interrupts the brain's normal activities. Although you may have cuts or bruises on your head or face, you may have no other visible signs of a brain injury. In most cases, damage to the brain from a concussion can't be seen in tests such as a CT or MRI scan. For a few weeks, you may have low energy, dizziness, trouble sleeping, a headache, ringing in your ears, or nausea. You may also feel anxious, grumpy, or depressed. You may have problems with memory and concentration. These symptoms are common after a concussion. They should slowly improve over time. Sometimes this takes weeks or even months. Someone who lives with you should know how to care for you. Please share this and all information with a caregiver who will be available to help if needed. Follow-up care is a key part of your treatment and safety. Be sure to make and go to all appointments, and call your doctor if you are having problems. It's also a good idea to know your test results and keep a list of the medicines you take. How can you care for yourself at home? Pain control  · Put ice or a cold pack on the part of your head that hurts for 10 to 20 minutes at a time. Put a thin cloth between the ice and your skin. · Be safe with medicines. Read and follow all instructions on the label. ¨ If the doctor gave you a prescription medicine for pain, take it as prescribed. ¨ If you are not taking a prescription pain medicine, ask your doctor if you can take an over-the-counter medicine. Recovery  · Follow your doctor's instructions. He or she will tell you if you need someone to watch you closely for the next 24 hours or longer. · Rest is the best way to recover from a concussion.  You need to rest your body and your brain:  ¨ Get plenty of sleep at night. And take rest breaks during the day. ¨ Avoid activities that take a lot of physical or mental work. This includes housework, exercise, schoolwork, video games, text messaging, and using the computer. ¨ You may need to change your school or work schedule while you recover. ¨ Return to your normal activities slowly. Do not try to do too much at once. · Do not drink alcohol or use illegal drugs. Alcohol and illegal drugs can slow your recovery. And they can increase your risk of a second brain injury. · Avoid activities that could lead to another concussion. Follow your doctor's instructions for a gradual return to activity and sports. · Ask your doctor when it's okay for you to drive a car, ride a bike, or operate machinery. How should you return to activity? Your return to sports or activity should be gradual. It should only begin when all symptoms of a concussion are gone, both while at rest and during exercise or exertion. Doctors and concussion specialists suggest steps to follow for returning to sports after a concussion. Use these steps as a guide. You should slowly progress through the following levels of activity:  1. No activity. This means complete physical and mental rest.  2. Light aerobic activity. This can include walking, swimming, or other exercise at less than 70% of maximum heart rate. No resistance training is included in this step. 3. Sport-specific exercise. This includes running drills or skating drills (depending on the sport), but no head impact. 4. Noncontact training drills. This includes more complex training drills such as passing. The athlete may also begin light resistance training. 5. Full-contact practice. The athlete can participate in normal training. 6. Return to normal game play. This is the final step and allows the athlete to join in normal game play. Watch and keep track of your progress.  It should take at least 6 days for you to go from light activity to normal game play. Make sure that you can stay at each new level of activity for at least 24 hours without symptoms, or as long as your doctor says, before doing more. If one or more symptoms come back, return to a lower level of activity for at least 24 hours. Don't move on until all symptoms are gone. When should you call for help? Call 911 anytime you think you may need emergency care. For example, call if:  ? · You have a seizure. ? · You passed out (lost consciousness). ? · You are confused or can't stay awake. ?Call your doctor now or seek immediate medical care if:  ? · You have new or worse vomiting. ? · You feel less alert. ? · You have new weakness or numbness in any part of your body. ? Watch closely for changes in your health, and be sure to contact your doctor if:  ? · You do not get better as expected. ? · You have new symptoms, such as headaches, trouble concentrating, or changes in mood. Where can you learn more? Go to http://erick-bernabe.info/. Enter J439 in the search box to learn more about \"Concussion: Care Instructions. \"  Current as of: October 14, 2016  Content Version: 11.4  © 2477-5509 [employer]. Care instructions adapted under license by [employer] (which disclaims liability or warranty for this information). If you have questions about a medical condition or this instruction, always ask your healthcare professional. [place] [address] any warranty or liability for your use of this information. Preventing Falls: Care Instructions  Your Care Instructions    Getting around your home safely can be a challenge if you have injuries or health problems that make it easy for you to fall. Loose rugs and furniture in walkways are among the dangers for many older people who have problems walking or who have poor eyesight.  People who have conditions such as arthritis, osteoporosis, or dementia also have to be careful not to fall. You can make your home safer with a few simple measures. Follow-up care is a key part of your treatment and safety. Be sure to make and go to all appointments, and call your doctor if you are having problems. It's also a good idea to know your test results and keep a list of the medicines you take. How can you care for yourself at home? Taking care of yourself  · You may get dizzy if you do not drink enough water. To prevent dehydration, drink plenty of fluids, enough so that your urine is light yellow or clear like water. Choose water and other caffeine-free clear liquids. If you have kidney, heart, or liver disease and have to limit fluids, talk with your doctor before you increase the amount of fluids you drink. · Exercise regularly to improve your strength, muscle tone, and balance. Walk if you can. Swimming may be a good choice if you cannot walk easily. · Have your vision and hearing checked each year or any time you notice a change. If you have trouble seeing and hearing, you might not be able to avoid objects and could lose your balance. · Know the side effects of the medicines you take. Ask your doctor or pharmacist whether the medicines you take can affect your balance. Sleeping pills or sedatives can affect your balance. · Limit the amount of alcohol you drink. Alcohol can impair your balance and other senses. · Ask your doctor whether calluses or corns on your feet need to be removed. If you wear loose-fitting shoes because of calluses or corns, you can lose your balance and fall. · Talk to your doctor if you have numbness in your feet. Preventing falls at home  · Remove raised doorway thresholds, throw rugs, and clutter. Repair loose carpet or raised areas in the floor. · Move furniture and electrical cords to keep them out of walking paths. · Use nonskid floor wax, and wipe up spills right away, especially on ceramic tile floors.   · If you use a walker or cane, put rubber tips on it. If you use crutches, clean the bottoms of them regularly with an abrasive pad, such as steel wool. · Keep your house well lit, especially [place], and outside walkways. Use night-lights in areas such as hallways and bathrooms. Add extra light switches or use remote switches (such as switches that go on or off when you clap your hands) to make it easier to turn lights on if you have to get up during the night. · Install sturdy handrails on stairways. · Move items in your cabinets so that the things you use a lot are on the lower shelves (about waist level). · Keep a cordless phone and a flashlight with new batteries by your bed. If possible, put a phone in each of the main rooms of your house, or carry a cell phone in case you fall and cannot reach a phone. Or, you can wear a device around your neck or wrist. You push a button that sends a signal for help. · Wear low-heeled shoes that fit well and give your feet good support. Use footwear with nonskid soles. Check the heels and soles of your shoes for wear. Repair or replace worn heels or soles. · Do not wear socks without shoes on wood floors. · Walk on the grass when the sidewalks are slippery. If you live in an area that gets snow and ice in the winter, sprinkle salt on slippery steps and sidewalks. Preventing falls in the bath  · Install grab bars and nonskid mats inside and outside your shower or tub and near the toilet and sinks. · Use shower chairs and bath benches. · Use a hand-held shower head that will allow you to sit while showering. · Get into a tub or shower by putting the weaker leg in first. Get out of a tub or shower with your strong side first.  · Repair loose toilet seats and consider installing a raised toilet seat to make getting on and off the toilet easier. · Keep your bathroom door unlocked while you are in the shower. Where can you learn more?   Go to http://erick-bernabe.info/. Enter 0476 [id number] in the search box to learn more about \"Preventing Falls: Care Instructions. \"  Current as of: May 12, 2017  Content Version: 11.4  © 8326-1037 [employer]. Care instructions adapted under license by [employer] (which disclaims liability or warranty for this information). If you have questions about a medical condition or this instruction, always ask your healthcare professional. [address] any warranty or liability for your use of this information. Cuts: Care Instructions  Your Care Instructions  A cut can happen anywhere on your body. Stitches, staples, skin adhesives, or pieces of tape called Steri-Strips are sometimes used to keep the edges of a cut together and help it heal. Steri-Strips can be used by themselves or with stitches or staples. Sometimes cuts are left open. If the cut went deep and through the skin, the doctor may have closed the cut in two layers. A deeper layer of stitches brings the deep part of the cut together. These stitches will dissolve and don't need to be removed. The upper layer closure, which could be stitches, staples, Steri-Strips, or adhesive, is what you see on the cut. A cut is often covered by a bandage. The doctor has checked you carefully, but problems can develop later. If you notice any problems or new symptoms, get medical treatment right away. Follow-up care is a key part of your treatment and safety. Be sure to make and go to all appointments, and call your doctor if you are having problems. It's also a good idea to know your test results and keep a list of the medicines you take. How can you care for yourself at home? If a cut is open or closed  · Prop up the sore area on a pillow anytime you sit or lie down during the next 3 days. Try to keep it above the level of your heart. This will help reduce swelling.   · Keep the cut dry for the first 24 to 48 hours. After this, you can shower if your doctor okays it. Pat the cut dry. · Don't soak the cut, such as in a bathtub. Your doctor will tell you when it's safe to get the cut wet. · After the first 24 to 48 hours, clean the cut with soap and water 2 times a day unless your doctor gives you different instructions. ¨ Don't use hydrogen peroxide or alcohol, which can slow healing. ¨ You may cover the cut with a thin layer of petroleum jelly and a nonstick bandage. ¨ If the doctor put a bandage over the cut, put on a new bandage after cleaning the cut or if the bandage gets wet or dirty. · Avoid any activity that could cause your cut to reopen. · Be safe with medicines. Read and follow all instructions on the label. ¨ If the doctor gave you a prescription medicine for pain, take it as prescribed. ¨ If you are not taking a prescription pain medicine, ask your doctor if you can take an over-the-counter medicine. If the cut is closed with stitches, staples, or Steri-Strips  · Follow the above instructions for open or closed cuts. · Do not remove the stitches or staples on your own. Your doctor will tell you when to come back to have the stitches or staples removed. · Leave Steri-Strips on until they fall off. If the cut is closed with a skin adhesive  · Follow the above instructions for open or closed cuts. · Leave the skin adhesive on your skin until it falls off on its own. This may take 5 to 10 days. · Do not scratch, rub, or pick at the adhesive. · Do not put the sticky part of a bandage directly on the adhesive. · Do not put any kind of ointment, cream, or lotion over the area. This can make the adhesive fall off too soon. Do not use hydrogen peroxide or alcohol, which can slow healing. When should you call for help? Call your doctor now or seek immediate medical care if:  ? · You have new pain, or your pain gets worse. ? · The skin near the cut is cold or pale or changes color.    ? · You have tingling, weakness, or numbness near the cut.   ? · The cut starts to bleed, and blood soaks through the bandage. Oozing small amounts of blood is normal.   ? · You have trouble moving the area near the cut.   ? · You have symptoms of infection, such as:  ¨ Increased pain, swelling, warmth, or redness around the cut. ¨ Red streaks leading from the cut. ¨ Pus draining from the cut. ¨ A fever. ? Watch closely for changes in your health, and be sure to contact your doctor if:  ? · The cut reopens. ? · You do not get better as expected. Where can you learn more? Go to http://erick-bernabe.info/. Enter M735 in the search box to learn more about \"Cuts: Care Instructions. \"  Current as of: March 20, 2017  Content Version: 11.4  © 2493-0838 [employer]. Care instructions adapted under license by [employer] (which disclaims liability or warranty for this information). If you have questions about a medical condition or this instruction, always ask your healthcare professional. [address] any warranty or liability for your use of this information.

## 2018-06-03 NOTE — ED TRIAGE NOTES
Pt states that he fell when he got up last night to use the restroom hitting his head and lacerated his right elbow. Pt states that he felt dizzy when urinating so he is not sure if he went unconscious which may have caused the fall.

## 2018-06-03 NOTE — ED PROVIDER NOTES
HPI Comments: Laurel Zambrano is a 75 y.o. male who presents ambulatory to the ED with his daughter after sustaining a fall last PM. Patient states he  Has been ill with a sinus infection, cough for about a week. He states he took Mucinex yesterday morning and then took Nyquil last night. Patient states he got up to use the restroom and noticed he was dizzy, stating he thinks it was from the Nyquil. Patient states he fell to the floor and awoke to his wife calling his name. Patient states he hit his head, positive LOC. Patient states he takes Plavix due to a MI in the past.  Patient also states he has pain in the right elbow. States his elbow was bleeding, he washed it off and put a band aid on it and then returned to bed. Patient awoke this morning and decided to be evaluated at the urging of his daughter, knowing he is on Plavix. There are no further complaints at this time. PCP: Ruthie Randolph MD    PMHx significant for: Past Medical History:  No date: Endocrine disease      Comment: hyperthyroidism  No date: Hyperlipidemia  No date: Hypertension  04/30/2018: Non-STEMI (non-ST elevated myocardial infarcti*      Comment: KELLY to mid circ    PSHx significant for: Past Surgical History:  09/2013: HX ORTHOPAEDIC      Comment: bilateral knee replacement  2011: HX SHOULDER ARTHROSCOPY    Social Hx: Tobacco: none EtOH: occasional Illicit drug use: none    There are no further complaints or symptoms at this time. The history is provided by the patient. Past Medical History:   Diagnosis Date    Endocrine disease     hyperthyroidism    Hyperlipidemia     Hypertension     Non-STEMI (non-ST elevated myocardial infarction) ([facility] [address] 04/30/2018    KELLY to mid circ       Past Surgical History:   Procedure Laterality Date    HX ORTHOPAEDIC  09/2013    bilateral knee replacement    HX SHOULDER ARTHROSCOPY  2011         History reviewed. No pertinent family history.     Social History     Social History    Marital status:      Spouse name: N/A    Number of children: N/A    Years of education: N/A     Occupational History    Not on file. Social History Main Topics    Smoking status: Never Smoker    Smokeless tobacco: Never Used    Alcohol use 6.0 oz/week     10 Shots of liquor per week      Comment: 2 shots/day    Drug use: No    Sexual activity: Not on file     Other Topics Concern    Not on file     Social History Narrative         ALLERGIES: Review of patient's allergies indicates no known allergies. Review of Systems   Constitutional: Negative for activity change, appetite change, chills, fatigue and fever. HENT: Negative for congestion, ear discharge, ear pain, sinus pain, sinus pressure, sore throat and trouble swallowing. Eyes: Negative for photophobia, pain, redness, itching and visual disturbance. Respiratory: Negative for chest tightness and shortness of breath. Cardiovascular: Negative for chest pain and palpitations. Gastrointestinal: Negative for abdominal distention, abdominal pain, nausea and vomiting. Endocrine: Negative. Genitourinary: Negative for difficulty urinating, frequency and urgency. Musculoskeletal: Positive for arthralgias (right elbow pain). Negative for back pain, neck pain and neck stiffness. Skin: Positive for color change (small bruising to the posterior head.) and wound (small hemostatic wound to the right elbow. ). Negative for pallor and rash. Allergic/Immunologic: Negative. Neurological: Negative for dizziness, syncope, weakness and headaches. Hematological: Bruises/bleeds easily (on plavix). Psychiatric/Behavioral: Negative for behavioral problems. The patient is not nervous/anxious.         Vitals:    06/03/18 1320   BP: (!) 164/96   Pulse: 67   Resp: 16   Temp: 98.1 °F (36.7 °C)   SpO2: 99%   Weight: 91.8 kg (202 lb 6 oz)   Height: 5' 9\" (1.753 m)            Physical Exam   Constitutional: He is oriented to person, place, and time. He appears well-developed and well-nourished. No distress. HENT:   Head: Normocephalic. Right Ear: External ear normal.   Left Ear: External ear normal.   Nose: Nose normal.   Mouth/Throat: Oropharynx is clear and moist.   Bruising to posterior head   Eyes: Conjunctivae and EOM are normal. Pupils are equal, round, and reactive to light. Right eye exhibits no discharge. Left eye exhibits no discharge. Neck: Normal range of motion. Neck supple. No JVD present. No tracheal deviation present. Cardiovascular: Normal rate, regular rhythm, normal heart sounds and intact distal pulses. Exam reveals no gallop. No murmur heard. Pulmonary/Chest: Effort normal and breath sounds normal. No respiratory distress. He has no wheezes. He has no rales. He exhibits no tenderness. Abdominal: Soft. Bowel sounds are normal. He exhibits no distension. There is no tenderness. There is no rebound and no guarding. Genitourinary:   Genitourinary Comments: Negative     Musculoskeletal: Normal range of motion. He exhibits tenderness (right elbow). He exhibits no edema. Neurological: He is alert and oriented to person, place, and time. Skin: Skin is warm and dry. No rash noted. No erythema. No pallor. Psychiatric: He has a normal mood and affect. His behavior is normal. Judgment and thought content normal.   Nursing note and vitals reviewed. MDM  Number of Diagnoses or Management Options  Acute sinusitis, recurrence not specified, unspecified location: new and requires workup  Concussion with loss of consciousness of 30 minutes or less, initial encounter: new and requires workup  Cough: new and requires workup  Fall, initial encounter: new and requires workup  Laceration of right elbow, initial encounter: new and requires workup  Diagnosis management comments: Plan:  Discharge to home and follow up with PCP. ED Course   2:23 PM Reviewed labs. Explained results to patient and family.   4:15 PM Reviewed Ct scan results. 4:42 PM  Pt has been reexamined. Pt has no new complaints, changes or physical findings. Care plan outlined and precautions discussed. All available results were reviewed with pt. All medications were reviewed with pt. All of pt's questions and concerns were addressed. Pt agrees to F/U as instructed and agrees to return to ED upon further deterioration. Pt is ready to go home.   Ayesha Fitzgerald NP        Procedures

## 2018-06-04 ENCOUNTER — APPOINTMENT (OUTPATIENT)
Dept: CARDIAC REHAB | Age: 68
End: 2018-06-04
Payer: MEDICARE

## 2018-06-04 ENCOUNTER — TELEPHONE (OUTPATIENT)
Dept: CARDIAC REHAB | Age: 68
End: 2018-06-04

## 2018-06-04 ENCOUNTER — TELEPHONE (OUTPATIENT)
Dept: CARDIOLOGY CLINIC | Age: 68
End: 2018-06-04

## 2018-06-04 LAB
ATRIAL RATE: 61 BPM
CALCULATED P AXIS, ECG09: 51 DEGREES
CALCULATED R AXIS, ECG10: 31 DEGREES
CALCULATED T AXIS, ECG11: 51 DEGREES
DIAGNOSIS, 93000: NORMAL
P-R INTERVAL, ECG05: 176 MS
Q-T INTERVAL, ECG07: 416 MS
QRS DURATION, ECG06: 108 MS
QTC CALCULATION (BEZET), ECG08: 418 MS
VENTRICULAR RATE, ECG03: 61 BPM

## 2018-06-04 NOTE — TELEPHONE ENCOUNTER
----- Message from [place]. Anitha Stein sent at 6/3/2018 12:10 PM EDT -----  Regarding: RE: plavix and fall  Contact: [phone]  Varinder Sat,  I thought I should let you know I fainted and fell last night after getting up to go to the bathroom. I have a bad cold and took mucinex  DM yesterday morning and then Nyquil before I went to bed. I bumped my head and have a very small amount of swelling, no cut on my head but a blood blister look on my skin. I think I was told to let your office know if I hit my head, since I am taking Plavix. Please advise if I need to have this looked at. Thanks,  Vasu Lindquist  ----- Message -----  From: Raul Wilson RN  Sent: 5/23/2018  2:22 PM EDT  To: Chrissie Zuñiga  Subject: RE:amlodipine    That sounds like a good plan. Thank you. Hope that you have a great day. Olvin Pate RN    ----- Message -----     From: [employer] Edwards[facility]han Velasquez. Anitha Stein     Sent: 5/23/2018  1:59 PM EDT       To: Raul Wilson RN  Subject: Sae Fragoso,  Thanks for the reply. I will keep tabs on my blood pressure and contact you if I notice an increase. Vasu Lindquist  ----- Message -----  From: Raul Wilson RN  Sent: 5/23/2018 11:06 AM EDT  To: Chrissie Zuñiga  Subject: amlodipine    Good morning,     I spoke with Dr. Guzman Lu, he states that those are good results with stopping amlodipine. If you are feeling well continue to stay off of amlodipine and monitor your blood pressure 2-3 times per week. Keep your current follow up appointment with Dr. Guzman Lu scheduled for 9/6/18    If you have any questions don't hesitate to contact us.      Thanks,  Sapna De La Rosa RN

## 2018-06-04 NOTE — TELEPHONE ENCOUNTER
Attempted to reach patient by telephone. A message was left for return call. "[employer]"t message sent to patient instructing him to report to ED immediately for evaluation.

## 2018-06-04 NOTE — TELEPHONE ENCOUNTER
Verified patient with two types of identifiers. Patient reported to ED yesterday for evaluation. Provided education on need to report to ED for evaluation after pt hits his head while taking an [address]. Verbalizes understanding. And will call with any questions or concerns.

## 2018-06-05 ENCOUNTER — APPOINTMENT (OUTPATIENT)
Dept: CARDIAC REHAB | Age: 68
End: 2018-06-05
Payer: MEDICARE

## 2018-06-05 ENCOUNTER — APPOINTMENT (OUTPATIENT)
Dept: CARDIAC REHAB | Age: 68
End: 2018-06-05

## 2018-06-06 ENCOUNTER — APPOINTMENT (OUTPATIENT)
Dept: CARDIAC REHAB | Age: 68
End: 2018-06-06
Payer: MEDICARE

## 2018-06-12 ENCOUNTER — HOSPITAL ENCOUNTER (OUTPATIENT)
Dept: CARDIAC REHAB | Age: 68
Discharge: HOME | End: 2018-06-12
Payer: MEDICARE

## 2018-06-12 VITALS — WEIGHT: 198 LBS | BODY MASS INDEX: 29.24 KG/M2

## 2018-06-12 PROCEDURE — 93798 PHYS/QHP OP CAR RHAB W/ECG: CPT | Performed by: DIETITIAN, REGISTERED

## 2018-06-12 PROCEDURE — 93797 PHYS/QHP OP CAR RHAB WO ECG: CPT | Performed by: DIETITIAN, REGISTERED

## 2018-06-13 ENCOUNTER — APPOINTMENT (OUTPATIENT)
Dept: CARDIAC REHAB | Age: 68
End: 2018-06-13
Payer: MEDICARE

## 2018-06-19 ENCOUNTER — HOSPITAL ENCOUNTER (OUTPATIENT)
Dept: CARDIAC REHAB | Age: 68
Discharge: HOME | End: 2018-06-19
Payer: MEDICARE

## 2018-06-19 VITALS — BODY MASS INDEX: 28.87 KG/M2 | WEIGHT: 195.5 LBS

## 2018-06-19 PROCEDURE — 93798 PHYS/QHP OP CAR RHAB W/ECG: CPT

## 2018-06-19 PROCEDURE — 93797 PHYS/QHP OP CAR RHAB WO ECG: CPT

## 2018-06-26 ENCOUNTER — HOSPITAL ENCOUNTER (OUTPATIENT)
Dept: CARDIAC REHAB | Age: 68
Discharge: HOME | End: 2018-06-26
Payer: MEDICARE

## 2018-06-26 VITALS — WEIGHT: 194.5 LBS | BODY MASS INDEX: 28.72 KG/M2

## 2018-06-26 PROCEDURE — 93798 PHYS/QHP OP CAR RHAB W/ECG: CPT

## 2018-06-26 PROCEDURE — 93797 PHYS/QHP OP CAR RHAB WO ECG: CPT | Performed by: DIETITIAN, REGISTERED

## 2018-07-03 ENCOUNTER — HOSPITAL ENCOUNTER (OUTPATIENT)
Dept: CARDIAC REHAB | Age: 68
Discharge: HOME | End: 2018-07-03
Payer: MEDICARE

## 2018-07-03 VITALS — WEIGHT: 194 LBS | BODY MASS INDEX: 28.65 KG/M2

## 2018-07-03 PROCEDURE — 93798 PHYS/QHP OP CAR RHAB W/ECG: CPT | Performed by: DIETITIAN, REGISTERED

## 2018-07-10 ENCOUNTER — HOSPITAL ENCOUNTER (OUTPATIENT)
Dept: CARDIAC REHAB | Age: 68
Discharge: HOME | End: 2018-07-10
Payer: MEDICARE

## 2018-07-10 VITALS — WEIGHT: 192.6 LBS | BODY MASS INDEX: 28.44 KG/M2

## 2018-07-10 PROCEDURE — 93797 PHYS/QHP OP CAR RHAB WO ECG: CPT

## 2018-07-10 PROCEDURE — 93798 PHYS/QHP OP CAR RHAB W/ECG: CPT

## 2018-07-17 ENCOUNTER — HOSPITAL ENCOUNTER (OUTPATIENT)
Dept: CARDIAC REHAB | Age: 68
Discharge: HOME | End: 2018-07-17
Payer: MEDICARE

## 2018-07-17 VITALS — WEIGHT: 191.1 LBS | BODY MASS INDEX: 28.22 KG/M2

## 2018-07-17 PROCEDURE — 93798 PHYS/QHP OP CAR RHAB W/ECG: CPT

## 2018-07-17 PROCEDURE — 93797 PHYS/QHP OP CAR RHAB WO ECG: CPT

## 2018-07-24 ENCOUNTER — TELEPHONE (OUTPATIENT)
Dept: CARDIAC REHAB | Age: 68
End: 2018-07-24

## 2018-07-24 ENCOUNTER — APPOINTMENT (OUTPATIENT)
Dept: CARDIAC REHAB | Age: 68
End: 2018-07-24
Payer: MEDICARE

## 2018-07-24 NOTE — TELEPHONE ENCOUNTER
7/24/2018 [facility]:  [facility] called to cancel today's appointment d/t no reason given. Will return for next appointment.  Edwardo Gloria

## 2018-07-31 ENCOUNTER — APPOINTMENT (OUTPATIENT)
Dept: CARDIAC REHAB | Age: 68
End: 2018-07-31
Payer: MEDICARE

## 2018-07-31 ENCOUNTER — TELEPHONE (OUTPATIENT)
Dept: CARDIAC REHAB | Age: 68
End: 2018-07-31

## 2018-07-31 NOTE — TELEPHONE ENCOUNTER
7/31/2018 Cardiac Wellness: Mr. Martin Reed called to cancel all future appointment's d/t joined [address] closer to home.  Batsheva Stein

## 2018-08-07 ENCOUNTER — APPOINTMENT (OUTPATIENT)
Dept: CARDIAC REHAB | Age: 68
End: 2018-08-07

## 2018-08-13 ENCOUNTER — HOSPITAL ENCOUNTER (EMERGENCY)
Age: 68
Discharge: HOME | End: 2018-08-13
Attending: STUDENT IN AN ORGANIZED HEALTH CARE EDUCATION/TRAINING PROGRAM
Payer: MEDICARE

## 2018-08-13 VITALS
RESPIRATION RATE: 11 BRPM | HEIGHT: 69 IN | OXYGEN SATURATION: 99 % | DIASTOLIC BLOOD PRESSURE: 57 MMHG | SYSTOLIC BLOOD PRESSURE: 108 MMHG | TEMPERATURE: 97.9 F | HEART RATE: 66 BPM | BODY MASS INDEX: 28.47 KG/M2 | WEIGHT: 192.24 LBS

## 2018-08-13 DIAGNOSIS — R07.9 CHEST PAIN, UNSPECIFIED TYPE: Primary | ICD-10-CM

## 2018-08-13 LAB
ALBUMIN SERPL-MCNC: 3.8 G/DL (ref 3.5–5)
ALBUMIN/GLOB SERPL: 1.2 {RATIO} (ref 1.1–2.2)
ALP SERPL-CCNC: 67 U/L (ref 45–117)
ALT SERPL-CCNC: 25 U/L (ref 12–78)
ANION GAP SERPL CALC-SCNC: 10 MMOL/L (ref 5–15)
AST SERPL-CCNC: 27 U/L (ref 15–37)
ATRIAL RATE: 68 BPM
BASOPHILS # BLD: 0 K/UL (ref 0–0.1)
BASOPHILS NFR BLD: 0 % (ref 0–1)
BILIRUB SERPL-MCNC: 0.4 MG/DL (ref 0.2–1)
BUN SERPL-MCNC: 15 MG/DL (ref 6–20)
BUN/CREAT SERPL: 15 (ref 12–20)
CALCIUM SERPL-MCNC: 8.8 MG/DL (ref 8.5–10.1)
CALCULATED P AXIS, ECG09: 55 DEGREES
CALCULATED R AXIS, ECG10: 24 DEGREES
CALCULATED T AXIS, ECG11: 54 DEGREES
CHLORIDE SERPL-SCNC: 98 MMOL/L (ref 97–108)
CK SERPL-CCNC: 159 U/L (ref 39–308)
CO2 SERPL-SCNC: 28 MMOL/L (ref 21–32)
CREAT SERPL-MCNC: 1.03 MG/DL (ref 0.7–1.3)
DIAGNOSIS, 93000: NORMAL
DIFFERENTIAL METHOD BLD: ABNORMAL
EOSINOPHIL # BLD: 0 K/UL (ref 0–0.4)
EOSINOPHIL NFR BLD: 1 % (ref 0–7)
ERYTHROCYTE [DISTWIDTH] IN BLOOD BY AUTOMATED COUNT: 12.5 % (ref 11.5–14.5)
GLOBULIN SER CALC-MCNC: 3.3 G/DL (ref 2–4)
GLUCOSE SERPL-MCNC: 149 MG/DL (ref 65–100)
HCT VFR BLD AUTO: 40.3 % (ref 36.6–50.3)
HGB BLD-MCNC: 13.4 G/DL (ref 12.1–17)
IMM GRANULOCYTES # BLD: 0 K/UL (ref 0–0.04)
IMM GRANULOCYTES NFR BLD AUTO: 0 % (ref 0–0.5)
LYMPHOCYTES # BLD: 1 K/UL (ref 0.8–3.5)
LYMPHOCYTES NFR BLD: 14 % (ref 12–49)
MCH RBC QN AUTO: 32 PG (ref 26–34)
MCHC RBC AUTO-ENTMCNC: 33.3 G/DL (ref 30–36.5)
MCV RBC AUTO: 96.2 FL (ref 80–99)
MONOCYTES # BLD: 0.5 K/UL (ref 0–1)
MONOCYTES NFR BLD: 7 % (ref 5–13)
NEUTS SEG # BLD: 5.4 K/UL (ref 1.8–8)
NEUTS SEG NFR BLD: 78 % (ref 32–75)
NRBC # BLD: 0 K/UL (ref 0–0.01)
NRBC BLD-RTO: 0 PER 100 WBC
P-R INTERVAL, ECG05: 176 MS
PLATELET # BLD AUTO: 176 K/UL (ref 150–400)
PMV BLD AUTO: 10.1 FL (ref 8.9–12.9)
POTASSIUM SERPL-SCNC: 3.7 MMOL/L (ref 3.5–5.1)
PROT SERPL-MCNC: 7.1 G/DL (ref 6.4–8.2)
Q-T INTERVAL, ECG07: 398 MS
QRS DURATION, ECG06: 104 MS
QTC CALCULATION (BEZET), ECG08: 423 MS
RBC # BLD AUTO: 4.19 M/UL (ref 4.1–5.7)
SODIUM SERPL-SCNC: 136 MMOL/L (ref 136–145)
TROPONIN I SERPL-MCNC: <0.05 NG/ML
TROPONIN I SERPL-MCNC: <0.05 NG/ML
VENTRICULAR RATE, ECG03: 68 BPM
WBC # BLD AUTO: 6.9 K/UL (ref 4.1–11.1)

## 2018-08-13 PROCEDURE — 84484 ASSAY OF TROPONIN QUANT: CPT | Performed by: EMERGENCY MEDICINE

## 2018-08-13 PROCEDURE — 36415 COLL VENOUS BLD VENIPUNCTURE: CPT | Performed by: STUDENT IN AN ORGANIZED HEALTH CARE EDUCATION/TRAINING PROGRAM

## 2018-08-13 PROCEDURE — 99285 EMERGENCY DEPT VISIT HI MDM: CPT

## 2018-08-13 PROCEDURE — 93005 ELECTROCARDIOGRAM TRACING: CPT

## 2018-08-13 PROCEDURE — 82550 ASSAY OF CK (CPK): CPT | Performed by: EMERGENCY MEDICINE

## 2018-08-13 PROCEDURE — 85025 COMPLETE CBC W/AUTO DIFF WBC: CPT | Performed by: EMERGENCY MEDICINE

## 2018-08-13 PROCEDURE — 80053 COMPREHEN METABOLIC PANEL: CPT | Performed by: EMERGENCY MEDICINE

## 2018-08-13 RX ORDER — LOSARTAN POTASSIUM AND HYDROCHLOROTHIAZIDE 12.5; 5 MG/1; MG/1
1 TABLET ORAL DAILY
Qty: 30 TAB | Refills: 0 | Status: SHIPPED | OUTPATIENT
Start: 2018-08-13 | End: 2018-08-23

## 2018-08-13 NOTE — ED TRIAGE NOTES
Pt states that he has felt light headed with palpitations off and on all morning and developed right sided chest pain with no radiation about 40 minutes ago. He states that he has no N/V or SOB. Pt took 1 NTG sl with no relief.

## 2018-08-13 NOTE — ED PROVIDER NOTES
HPI Comments: 75 y.o. male with past medical history significant for hypertension, hyperlipidemia, NSTEMI who presents, accompanied by wife, with chief complaint of chest pain. Patient reports palpitations for the past 3-4 days with associated dizziness/lightheadedness. Today patient developed chest pain for which he took one nitroglycerin tablet without relief. Patient and wife also note low BP for the past week, he is still taking his Losartan as directly. Wife also reports patient has been drinking more alcohol the past several days. Patient reports 25 lbs weight loss since his MI due to diet. No shortness of breath, nausea. There are no other acute medical concerns at this time. Social hx: Nonsmoker; +EtOH (drinks Prosecco daily)  PCP: Oskar Jeronimo MD  Cardiologist: Dr. Adrian Quinones    Note written by Elzbieta Goodwin, as dictated by Benny Hagan MD 2:10 PM      The history is provided by the patient. Past Medical History:   Diagnosis Date    Endocrine disease     hyperthyroidism    Hyperlipidemia     Hypertension     Non-STEMI (non-ST elevated myocardial infarction) ([facility] [address] 04/30/2018    KELLY to mid circ       Past Surgical History:   Procedure Laterality Date    HX ORTHOPAEDIC  09/2013    bilateral knee replacement    HX SHOULDER ARTHROSCOPY  2011         No family history on file. Social History     Social History    Marital status:      Spouse name: N/A    Number of children: N/A    Years of education: N/A     Occupational History    Not on file. Social History Main Topics    Smoking status: Never Smoker    Smokeless tobacco: Never Used    Alcohol use 6.0 oz/week     10 Shots of liquor per week      Comment: 2 shots/day    Drug use: No    Sexual activity: Not on file     Other Topics Concern    Not on file     Social History Narrative         ALLERGIES: Review of patient's allergies indicates no known allergies.     Review of Systems   Constitutional: Negative for chills, diaphoresis, fatigue and fever. HENT: Negative for congestion, rhinorrhea, sinus pressure, sore throat, trouble swallowing and voice change. Eyes: Negative for photophobia and visual disturbance. Respiratory: Negative for cough, chest tightness and shortness of breath. Cardiovascular: Positive for chest pain and palpitations. Negative for leg swelling. Gastrointestinal: Negative for abdominal pain, blood in stool, constipation, diarrhea, nausea and vomiting. Musculoskeletal: Negative for arthralgias, myalgias and neck pain. Neurological: Positive for dizziness and light-headedness. Negative for weakness, numbness and headaches. All other systems reviewed and are negative. Vitals:    08/13/18 1241   BP: 120/76   Pulse: 82   Resp: 16   Temp: 98 °F (36.7 °C)   SpO2: 99%   Weight: 87.2 kg (192 lb 3.9 oz)   Height: 5' 9\" (1.753 m)            Physical Exam   Constitutional: He is oriented to person, place, and time. He appears well-developed and well-nourished. No distress. HENT:   Head: Normocephalic and atraumatic. Nose: Nose normal.   Mouth/Throat: Oropharynx is clear and moist. No oropharyngeal exudate. Eyes: Conjunctivae and EOM are normal. Right eye exhibits no discharge. Left eye exhibits no discharge. No scleral icterus. Neck: Normal range of motion. Neck supple. No JVD present. No tracheal deviation present. No thyromegaly present. Cardiovascular: Normal rate, regular rhythm, normal heart sounds and intact distal pulses. Exam reveals no gallop and no friction rub. No murmur heard. Pulmonary/Chest: Effort normal and breath sounds normal. No stridor. No respiratory distress. He has no wheezes. He has no rales. He exhibits no tenderness. Abdominal: Bowel sounds are normal. He exhibits no distension and no mass. There is no tenderness. There is no rebound. Musculoskeletal: Normal range of motion. He exhibits no edema or tenderness.    Lymphadenopathy: He has no cervical adenopathy. Neurological: He is alert and oriented to person, place, and time. No cranial nerve deficit. Coordination normal.   Skin: Skin is warm and dry. No rash noted. He is not diaphoretic. No erythema. No pallor. Psychiatric: He has a normal mood and affect. His behavior is normal. Judgment and thought content normal.   Nursing note and vitals reviewed. Note written by Elzbieta Goodwin, as dictated by Benny Hagan MD 2:13 PM       MDM  Number of Diagnoses or Management Options  Chest pain, unspecified type:   Diagnosis management comments: ACS, atypical chest pain, palpitations. 56-year-old male presenting palpitations unlikely ACLS our patient does have cardiac risk factors including MI x1 year ago. Palpitations versus electrolyte abnormality due to recent alcohol intake on differential his wife says that he's been drinking more recently especially this weekend. Plan: EKG, CBC, CMP, cardiac enzymes x2. Reassessment: Patient noted to be borderline hypotensive throughout stay likely secondary to overmedication of his hypertension meds as he's been exercising more had weight loss and better diet discussed with his cardiologist who agrees that we will cut his hypertensive meds and half and will have followup as an outpatient. Amount and/or Complexity of Data Reviewed  Clinical lab tests: ordered and reviewed  Review and summarize past medical records: yes  Independent visualization of images, tracings, or specimens: yes    Risk of Complications, Morbidity, and/or Mortality  Presenting problems: moderate  Diagnostic procedures: moderate  Management options: moderate    Patient Progress  Patient progress: improved        ED Course       Procedures    11:06 AM  The patient has been reevaluated. The patient is ready for discharge.  The patient's signs, symptoms, diagnosis, and discharge instructions have been discussed and the patient/ family has conveyed their understanding. The patient is to follow up as recommended or return to the ED should their symptoms worsen. Plan has been discussed and the patient is in agreement. LABORATORY TESTS:  No results found for this or any previous visit (from the past 12 hour(s)). IMAGING RESULTS:  No orders to display     No results found. MEDICATIONS GIVEN:  Medications - No data to display    IMPRESSION:  1. Chest pain, unspecified type        PLAN:  1. Discharge Medication List as of 8/13/2018  4:08 PM      START taking these medications    Details   losartan-hydroCHLOROthiazide (HYZAAR) 50-12.5 mg per tablet Take 1 Tab by mouth daily for 30 days. , Print, Disp-30 Tab, R-0         CONTINUE these medications which have NOT CHANGED    Details   multivitamin (ONE A DAY) tablet Take 1 Tab by mouth daily. , Historical Med      rosuvastatin (CRESTOR) 40 mg tablet Take 1 Tab by mouth nightly., Normal, Disp-90 Tab, R-1      clopidogrel (PLAVIX) 75 mg tab Take 1 Tab by mouth daily. , Normal, Disp-90 Tab, R-3      nitroglycerin (NITROSTAT) 0.4 mg SL tablet 1 Tab by SubLINGual route every five (5) minutes as needed for Chest Pain., Normal, Disp-1 Bottle, R-3      aspirin delayed-release 81 mg tablet Take 81 mg by mouth daily. , Historical Med      losartan-hydroCHLOROthiazide (HYZAAR) 100-25 mg per tablet Take 1 Tab by mouth daily. , Historical Med      levothyroxine (SYNTHROID) 100 mcg tablet Take 100 mcg by mouth Daily (before breakfast). , Historical Med      meloxicam (MOBIC) 15 mg tablet Take 15 mg by mouth daily as needed for Pain., Historical Med      diclofenac (VOLTAREN) 1 % gel Apply 2-4 g to affected area four (4) times daily as needed (joint pain). , Historical Med      valACYclovir (VALTREX) 1 gram tablet Take 1,000 mg by mouth as needed., Historical Med      zolpidem (AMBIEN) 10 mg tablet Take 10 mg by mouth nightly as needed for Sleep., Historical Med           2.    Follow-up Information     Follow up With Details Comments Contact Info    Leodan Brooke MD  If symptoms worsen [facility]  [address] [facility] [place] [facility]  If symptoms worsen [address] [facility]  [phone]    Lacy Carter MD Schedule an appointment as soon as possible for a visit  [place] [address]  [address] [phone]              Return to ED for new or worsening symptoms       Poppy Moscoso MD

## 2018-08-13 NOTE — ED NOTES
Patient verbalizes understanding of discharge instructions. Opportunity for questions provided. Patient in no apparent distress, VSS. Patient ambulatory upon discharge.    Visit Vitals    /57 (BP 1 Location: Left arm, BP Patient Position: At rest)    Pulse 66    Temp 97.9 °F (36.6 °C)    Resp 11    Ht 5' 9\" (1.753 m)    Wt 87.2 kg (192 lb 3.9 oz)    SpO2 99%    BMI 28.39 kg/m2

## 2018-08-13 NOTE — DISCHARGE INSTRUCTIONS
Chest Pain: Care Instructions  Your Care Instructions    There are many things that can cause chest pain. Some are not serious and will get better on their own in a few days. But some kinds of chest pain need more testing and treatment. Your doctor may have recommended a follow-up visit in the next 8 to 12 hours. If you are not getting better, you may need more tests or treatment. Even though your doctor has released you, you still need to watch for any problems. The doctor carefully checked you, but sometimes problems can develop later. If you have new symptoms or if your symptoms do not get better, get medical care right away. If you have worse or different chest pain or pressure that lasts more than 5 minutes or you passed out (lost consciousness), call 911 or seek other emergency help right away. A medical visit is only one step in your treatment. Even if you feel better, you still need to do what your doctor recommends, such as going to all suggested follow-up appointments and taking medicines exactly as directed. This will help you recover and help prevent future problems. How can you care for yourself at home? · Rest until you feel better. · Take your medicine exactly as prescribed. Call your doctor if you think you are having a problem with your medicine. · Do not drive after taking a prescription pain medicine. When should you call for help? Call 911 if:    · You passed out (lost consciousness).     · You have severe difficulty breathing.     · You have symptoms of a heart attack. These may include:  ¨ Chest pain or pressure, or a strange feeling in your chest.  ¨ Sweating. ¨ Shortness of breath. ¨ Nausea or vomiting. ¨ Pain, pressure, or a strange feeling in your back, neck, jaw, or upper belly or in one or both shoulders or arms. ¨ Lightheadedness or sudden weakness. ¨ A fast or irregular heartbeat.   After you call 911, the  may tell you to chew 1 adult-strength or 2 to 4 low-dose aspirin. Wait for an ambulance. Do not try to drive yourself.    Call your doctor today if:    · You have any trouble breathing.     · Your chest pain gets worse.     · You are dizzy or lightheaded, or you feel like you may faint.     · You are not getting better as expected.     · You are having new or different chest pain. Where can you learn more? Go to http://erick-bernabe.info/. Enter A120 in the search box to learn more about \"Chest Pain: Care Instructions. \"  Current as of: November 20, 2017  Content Version: 11.7  © 8693-3008 [employer]. Care instructions adapted under license by [employer] (which disclaims liability or warranty for this information). If you have questions about a medical condition or this instruction, always ask your healthcare professional. [address] any warranty or liability for your use of this information.

## 2018-08-14 ENCOUNTER — APPOINTMENT (OUTPATIENT)
Dept: CARDIAC REHAB | Age: 68
End: 2018-08-14

## 2018-08-15 NOTE — PROGRESS NOTES
Bernabe Dykes  75 y.o. With diagnosis of NSTEMI and stent on 04/30/18attended phase II cardiac rehab for 7 monitored session from 05/30/18 - 07/17/18. Mr. Lizandro Ocasio has joined a gym closer to his home.   He has been discharged from our program.    Juan David Szymanski RN  8/15/2018

## 2018-08-21 ENCOUNTER — APPOINTMENT (OUTPATIENT)
Dept: CARDIAC REHAB | Age: 68
End: 2018-08-21

## 2018-08-23 ENCOUNTER — OFFICE VISIT (OUTPATIENT)
Dept: CARDIOLOGY CLINIC | Age: 68
End: 2018-08-23

## 2018-08-23 VITALS
HEART RATE: 60 BPM | SYSTOLIC BLOOD PRESSURE: 148 MMHG | BODY MASS INDEX: 28.68 KG/M2 | DIASTOLIC BLOOD PRESSURE: 78 MMHG | HEIGHT: 69 IN | WEIGHT: 193.6 LBS | RESPIRATION RATE: 16 BRPM

## 2018-08-23 DIAGNOSIS — E05.90 HYPERTHYROIDISM: ICD-10-CM

## 2018-08-23 DIAGNOSIS — I10 ESSENTIAL HYPERTENSION: ICD-10-CM

## 2018-08-23 DIAGNOSIS — I95.1 ORTHOSTASIS: ICD-10-CM

## 2018-08-23 DIAGNOSIS — E34.9 ENDOCRINE DISEASE: ICD-10-CM

## 2018-08-23 DIAGNOSIS — I25.10 CORONARY ARTERY DISEASE INVOLVING NATIVE CORONARY ARTERY OF NATIVE HEART WITHOUT ANGINA PECTORIS: Primary | ICD-10-CM

## 2018-08-23 DIAGNOSIS — E78.00 PURE HYPERCHOLESTEROLEMIA: ICD-10-CM

## 2018-08-23 RX ORDER — LOSARTAN POTASSIUM 50 MG/1
50 TABLET ORAL DAILY
Qty: 90 TAB | Refills: 1 | Status: SHIPPED | OUTPATIENT
Start: 2018-08-23 | End: 2020-11-02

## 2018-08-23 NOTE — MR AVS SNAPSHOT
[address] [facility] [address] [facility] 
[phone] Patient: Garrett Barreto MRN: [id number] ENJ:[id number] Visit Information Date & Time Provider Department Dept. Phone Encounter #  
 8/23/2018  1:00 PM Ibis Joe MD [facility] [phone] [id number] Your Appointments 9/6/2018  9:40 AM  
ESTABLISHED PATIENT with Ibis Joe MD  
[facility] ([facility] SCHEDULING) Appt Note: 4 month f/u  
 [address] [facility] [address] [facility]  
[address] [facility] [id number] Upcoming Health Maintenance Date Due Hepatitis C Screening 1950 FOBT Q 1 YEAR AGE 50-75 8/19/1999 ZOSTER VACCINE AGE 60> 6/19/2009 GLAUCOMA SCREENING Q2Y 8/19/2014 MEDICARE YEARLY EXAM 5/1/2018 Influenza Age 5 to Adult 8/1/2018 Pneumococcal 65+ Low/Medium Risk (2 of 2 - PPSV23) 9/28/2018 DTaP/Tdap/Td series (2 - Td) 6/3/2028 Allergies as of 8/23/2018  Review Complete On: 8/23/2018 By: Ibis Joe MD  
 No Known Allergies Current Immunizations  Reviewed on 5/30/2018 Name Date Influenza Vaccine 10/1/2017 Pneumococcal Vaccine (Unspecified Type) 9/28/2013 Tdap 6/3/2018  2:59 PM  
  
 Not reviewed this visit Vitals BP Pulse Resp Height(growth percentile) Weight(growth percentile) BMI  
 148/78 (BP 1 Location: Left arm) 60 16 5' 9\" (1.753 m) 193 lb 9.6 oz (87.8 kg) 28.59 kg/m2 Smoking Status Never Smoker Vitals History BMI and BSA Data Body Mass Index Body Surface Area 28.59 kg/m 2 2.07 m 2 Preferred Pharmacy Pharmacy Name Phone [id number] [address], [address] [phone] Your Updated Medication List  
  
   
 This list is accurate as of 8/23/18  1:42 PM.  Always use your most recent med list.  
  
  
  
  
 aspirin delayed-release 81 mg tablet Take 81 mg by mouth daily. clopidogrel 75 mg Tab Commonly known as:  PLAVIX Take 1 Tab by mouth daily. diclofenac 1 % Gel Commonly known as:  VOLTAREN Apply 2-4 g to affected area four (4) times daily as needed (joint pain). levothyroxine 100 mcg tablet Commonly known as:  SYNTHROID Take 100 mcg by mouth Daily (before breakfast). losartan 50 mg tablet Commonly known as:  COZAAR Take 1 Tab by mouth daily. meloxicam 15 mg tablet Commonly known as:  MOBIC Take 15 mg by mouth daily as needed for Pain.  
  
 multivitamin tablet Commonly known as:  ONE A DAY Take 1 Tab by mouth daily. nitroglycerin 0.4 mg SL tablet Commonly known as:  NITROSTAT  
1 Tab by SubLINGual route every five (5) minutes as needed for Chest Pain. rosuvastatin 40 mg tablet Commonly known as:  CRESTOR Take 1 Tab by mouth nightly. zolpidem 10 mg tablet Commonly known as:  AMBIEN Take 10 mg by mouth nightly as needed for Sleep. Prescriptions Sent to Pharmacy Refills  
 losartan (COZAAR) 50 mg tablet 1 Sig: Take 1 Tab by mouth daily. Class: Normal  
 Pharmacy: [facility] #: [phone] Route: Oral  
  
Patient Instructions Stop taking losartan/HCTZ. Start taking losartan 50 mg daily. Follow up with Dr. Tahmina Yoder in 6 months. May cancel other appointment. Introducing [facility]! Dear Rere Dickson: Thank you for requesting a [employer] account. Our records indicate that you already have an active [employer] account. You can access your account anytime at [email or web address] Did you know that you can access your hospital and ER discharge instructions at any time in [facility]? You can also review all of your test results from your hospital stay or ER visit. Additional Information If you have questions, please visit the Frequently Asked Questions section of the [facility] website at [email or web address] Remember, [facility] is NOT to be used for urgent needs. For medical emergencies, dial 911. Now available from your iPhone and Android! Please provide this summary of care documentation to your next provider. Your primary care clinician is listed as Amira Spencer. If you have any questions after today's visit, please call [phone].

## 2018-08-23 NOTE — PATIENT INSTRUCTIONS
Stop taking losartan/HCTZ. Start taking losartan 50 mg daily. Follow up with Dr. Ayo Freire in 6 months. May cancel other appointment.

## 2018-08-23 NOTE — PROGRESS NOTES
Rebecca Hernandez     [birth year]       Kamille Childers MD, [facility]  Date of Visit-8/23/2018   PCP is Aisha Dooley MD   [address] [facility] [place]  [facility]  HPI:  Rebecca Hernandez is a 70 y.o. male   Pt hospitalized in spring with NSTEMI with diffuse disease. He had a KELLY to the circumflex, HTN, and previous heavy use of alcohol. He was doing well in May, he was in the ER last week with atypical chest pain, palpitation, dizziness and lightheadedness. Overall the pt states he is doing well. Pt states that he started taking Losartan at lower dose and started having low BP (90/53). He then stopped taking Losartan and BP has increased. Pt states that he has been losing weight (about 25 pounds) and starts doing better after cutting off his medicine dosage. Denies since Er any chest pain, edema, syncope or shortness of breath at rest, has no tachycardia, palpitations or sense of arrhythmia. Assessment/Plan:     1. CAD Prior stent circumflex, moderate other disease has no angina. Continue aspirin and statin     2. HTN, reason dizziness and low BP will start losartan 50 mg a day and take out the HCT part    3. Lipids on high potency statin as appropriate for secondary prevention. 4. Doing well with wt loss and exercise has transitioned PREP program     5. Next follow up in 6 months   Future Appointments  Date Time Provider Scotty Henderson   2/25/2019 11:40 AM Horace Gitelman,  [address]      Patient Instructions   Stop taking losartan/HCTZ. Start taking losartan 50 mg daily. Follow up with Dr. Isiah Childers in 6 months. May cancel other appointment. Key CAD CHF Meds             losartan (COZAAR) 50 mg tablet  (Taking) Take 1 Tab by mouth daily. rosuvastatin (CRESTOR) 40 mg tablet  (Taking) Take 1 Tab by mouth nightly. clopidogrel (PLAVIX) 75 mg tab  (Taking) Take 1 Tab by mouth daily.     nitroglycerin (NITROSTAT) 0.4 mg SL tablet  (Taking) 1 Tab by SubLINGual route every five (5) minutes as needed for Chest Pain. aspirin delayed-release 81 mg tablet  (Taking) Take 81 mg by mouth daily. Impression:   1. Coronary artery disease involving native coronary artery of native heart without angina pectoris    2. Endocrine disease    3. Hyperthyroidism    4. Orthostasis    5. Pure hypercholesterolemia    6. Essential hypertension       ROS-except as noted above. . A complete cardiac and respiratory are reviewed and negative except as above ; Resp-denies wheezing  or productive cough,. Const- No unusual weight loss or fever; Neuro-no recent seizure or CVA ; GI- No BRBPR, abdom pain, bloating ; - no  hematuria   see supplement sheet, initialed and to be scanned by staff  Past Medical History:   Diagnosis Date    Endocrine disease     hyperthyroidism    Hyperlipidemia     Hypertension     Non-STEMI (non-ST elevated myocardial infarction) ([facility] [address] 04/30/2018    KELLY to mid circ      Social Hx= reports that he has never smoked. He has never used smokeless tobacco. He reports that he drinks about 6.0 oz of alcohol per week  He reports that he does not use illicit drugs. Exam and Labs:  /78 (BP 1 Location: Left arm)  Pulse 60  Resp 16  Ht 5' 9\" (1.753 m)  Wt 193 lb 9.6 oz (87.8 kg)  BMI 28.59 kg/m2     Constitutional:  NAD, comfortable  Head: NC,AT. Eyes: No scleral icterus. Neck:  Neck supple. No JVD present. Throat: moist mucous membranes. Chest: Effort normal & normal respiratory excursion . Neurological: alert, conversant and oriented . Skin: Skin is not cold. No obvious systemic rash noted. Not diaphoretic. No erythema. Psychiatric:  Grossly normal mood and affect. Behavior appears normal. Extremities:  no clubbing or cyanosis. Abdomen: non distended    Lungs:breath sounds normal. No stridor. distress, wheezes or  Rales. Heart: normal rate, regular rhythm, normal S1, S2, no murmurs, rubs, clicks or gallops , PMI non displaced. Edema: Edema is none. Lab Results   Component Value Date/Time    Cholesterol, total 169 05/01/2018 03:25 AM    HDL Cholesterol 82 05/01/2018 03:25 AM    LDL, calculated 69 05/01/2018 03:25 AM    Triglyceride 90 05/01/2018 03:25 AM    CHOL/HDL Ratio 2.1 05/01/2018 03:25 AM     Lab Results   Component Value Date/Time    Sodium 136 08/13/2018 12:51 PM    Potassium 3.7 08/13/2018 12:51 PM    Chloride 98 08/13/2018 12:51 PM    CO2 28 08/13/2018 12:51 PM    Anion gap 10 08/13/2018 12:51 PM    Glucose 149 (H) 08/13/2018 12:51 PM    BUN 15 08/13/2018 12:51 PM    Creatinine 1.03 08/13/2018 12:51 PM    BUN/Creatinine ratio 15 08/13/2018 12:51 PM    GFR est AA >60 08/13/2018 12:51 PM    GFR est non-AA >60 08/13/2018 12:51 PM    Calcium 8.8 08/13/2018 12:51 PM      Wt Readings from Last 3 Encounters:   08/23/18 193 lb 9.6 oz (87.8 kg)   08/13/18 192 lb 3.9 oz (87.2 kg)   07/17/18 191 lb 1.6 oz (86.7 kg)      BP Readings from Last 3 Encounters:   08/13/18 108/57   06/03/18 131/72   05/08/18 138/90      Current Outpatient Prescriptions   Medication Sig    losartan (COZAAR) 50 mg tablet Take 1 Tab by mouth daily.  multivitamin (ONE A DAY) tablet Take 1 Tab by mouth daily.  rosuvastatin (CRESTOR) 40 mg tablet Take 1 Tab by mouth nightly.  clopidogrel (PLAVIX) 75 mg tab Take 1 Tab by mouth daily.  nitroglycerin (NITROSTAT) 0.4 mg SL tablet 1 Tab by SubLINGual route every five (5) minutes as needed for Chest Pain.  aspirin delayed-release 81 mg tablet Take 81 mg by mouth daily.  levothyroxine (SYNTHROID) 100 mcg tablet Take 100 mcg by mouth Daily (before breakfast).  meloxicam (MOBIC) 15 mg tablet Take 15 mg by mouth daily as needed for Pain.  diclofenac (VOLTAREN) 1 % gel Apply 2-4 g to affected area four (4) times daily as needed (joint pain).  zolpidem (AMBIEN) 10 mg tablet Take 10 mg by mouth nightly as needed for Sleep. No current facility-administered medications for this visit. Impression see above.       Written by Lexy Ocampo, as dictated by Judd Mauro MD.

## 2018-09-03 PROBLEM — E78.00 PURE HYPERCHOLESTEROLEMIA: Status: ACTIVE | Noted: 2018-09-03

## 2018-09-03 PROBLEM — I10 ESSENTIAL HYPERTENSION: Status: ACTIVE | Noted: 2018-09-03

## 2018-09-03 PROBLEM — I21.4 NSTEMI (NON-ST ELEVATED MYOCARDIAL INFARCTION) (HCC): Status: RESOLVED | Noted: 2018-04-30 | Resolved: 2018-09-03

## 2018-10-29 DIAGNOSIS — I25.10 CORONARY ARTERY DISEASE INVOLVING NATIVE CORONARY ARTERY OF NATIVE HEART WITHOUT ANGINA PECTORIS: ICD-10-CM

## 2018-10-31 RX ORDER — ROSUVASTATIN CALCIUM 40 MG/1
TABLET, COATED ORAL
Qty: 90 TAB | Refills: 1 | Status: SHIPPED | OUTPATIENT
Start: 2018-10-31 | End: 2019-04-06 | Stop reason: SDUPTHER

## 2018-10-31 NOTE — TELEPHONE ENCOUNTER
Request for crestor 40mg every day. Last office visit 8/23/18, next office visit 2/25/19. Refills per verbal order from Dr. Jennie Blackburn panel 5/2018.

## 2019-02-25 ENCOUNTER — OFFICE VISIT (OUTPATIENT)
Dept: CARDIOLOGY CLINIC | Age: 69
End: 2019-02-25

## 2019-02-25 VITALS
DIASTOLIC BLOOD PRESSURE: 68 MMHG | RESPIRATION RATE: 16 BRPM | OXYGEN SATURATION: 99 % | HEART RATE: 68 BPM | BODY MASS INDEX: 28.05 KG/M2 | HEIGHT: 69 IN | WEIGHT: 189.4 LBS | SYSTOLIC BLOOD PRESSURE: 150 MMHG

## 2019-02-25 DIAGNOSIS — E78.00 PURE HYPERCHOLESTEROLEMIA: ICD-10-CM

## 2019-02-25 DIAGNOSIS — I10 ESSENTIAL HYPERTENSION: ICD-10-CM

## 2019-02-25 DIAGNOSIS — I25.2 HX OF MYOCARDIAL INFARCTION: ICD-10-CM

## 2019-02-25 DIAGNOSIS — E05.90 HYPERTHYROIDISM: ICD-10-CM

## 2019-02-25 DIAGNOSIS — I25.10 CORONARY ARTERY DISEASE INVOLVING NATIVE CORONARY ARTERY OF NATIVE HEART WITHOUT ANGINA PECTORIS: Primary | ICD-10-CM

## 2019-02-25 NOTE — PROGRESS NOTES
Visit Vitals /68 (BP 1 Location: Left arm, BP Patient Position: Sitting) Pulse 68 Resp 16 Ht 5' 9\" (1.753 m) Wt 189 lb 6.4 oz (85.9 kg) SpO2 99% BMI 27.97 kg/m²

## 2019-02-25 NOTE — PROGRESS NOTES
Lis Kellogg     [birth year]       Kamille Vieyra MD, [facility]  Date of Visit-2/25/2019   PCP is Alon Jones MD   [facility] [place]  [facility]  HPI:  Lis Kellogg is a 70 y.o. male   Six month fu   Pt hospitalized in spring 2018  with NSTEMI with diffuse disease. He had a KELLY to the circumflex, HTN, and previous heavy use of alcohol. Today Denies chest pain, edema, syncope or shortness of breath at rest   Has no tachycardia , palpitations or sense of arrythmia   His BP has been variable    Assessment/Plan:     1. CAD   Prior stent circumflex, moderate other disease has no angina. Continue DAPT (dual anti-platelet therapy)  and statin   Discussed data to extend DAPT past one year post PCI will aim for about 2 years out    2. HTN, bp at home run 115-150 but most are 120s  He was off due to dizziness but now back on and dose up by Dr Judy Mejia  Overall doing well    3. Lipids on high potency statin as appropriate for secondary prevention. 4. Doing well with wt loss and exercise has transitioned PREP program and gym and he has done very well    5. Next follow up in 6 months with stress echo        Key CAD CHF Meds             rosuvastatin (CRESTOR) 40 mg tablet  (Taking) TAKE 1 TABLET BY MOUTH EVERYDAY AT BEDTIME    losartan (COZAAR) 50 mg tablet  (Taking) Take 1 Tab by mouth daily. clopidogrel (PLAVIX) 75 mg tab  (Taking) Take 1 Tab by mouth daily. nitroglycerin (NITROSTAT) 0.4 mg SL tablet  (Taking) 1 Tab by SubLINGual route every five (5) minutes as needed for Chest Pain. aspirin delayed-release 81 mg tablet  (Taking) Take 81 mg by mouth daily. Impression:   1. Coronary artery disease involving native coronary artery of native heart without angina pectoris    2. Essential hypertension    3. Pure hypercholesterolemia    4. Hx of myocardial infarction    5. Hyperthyroidism       ROS-except as noted above. . A complete cardiac and respiratory are reviewed and negative except as above ; Resp-denies wheezing  or productive cough,. Const- No unusual weight loss or fever; Neuro-no recent seizure or CVA ; GI- No BRBPR, abdom pain, bloating ; - no  hematuria   see supplement sheet, initialed and to be scanned by staff  Past Medical History:   Diagnosis Date    Hyperlipidemia     Hypertension     Hyperthyroidism     hyperthyroidism    Non-STEMI (non-ST elevated myocardial infarction) ([facility] [address] 04/30/2018    KELLY to mid circ      Social Hx= reports that  has never smoked. he has never used smokeless tobacco. He reports that he drinks about 6.0 oz of alcohol per week. He reports that he does not use drugs. Exam and Labs:  /68 (BP 1 Location: Left arm, BP Patient Position: Sitting)   Pulse 68   Resp 16   Ht 5' 9\" (1.753 m)   Wt 189 lb 6.4 oz (85.9 kg)   SpO2 99%   BMI 27.97 kg/m²      Constitutional:  NAD, comfortable  Head: NC,AT. Eyes: No scleral icterus. Neck:  Neck supple. No JVD present. Throat: moist mucous membranes. Chest: Effort normal & normal respiratory excursion . Neurological: alert, conversant and oriented . Skin: Skin is not cold. No obvious systemic rash noted. Not diaphoretic. No erythema. Psychiatric:  Grossly normal mood and affect. Behavior appears normal. Extremities:  no clubbing or cyanosis. Abdomen: non distended    Lungs:breath sounds normal. No stridor. distress, wheezes or  Rales. Heart: normal rate, regular rhythm, normal S1, S2, no murmurs, rubs, clicks or gallops , PMI non displaced. Edema: Edema is none.   Lab Results   Component Value Date/Time    Cholesterol, total 169 05/01/2018 03:25 AM    HDL Cholesterol 82 05/01/2018 03:25 AM    LDL, calculated 69 05/01/2018 03:25 AM    Triglyceride 90 05/01/2018 03:25 AM    CHOL/HDL Ratio 2.1 05/01/2018 03:25 AM     Lab Results   Component Value Date/Time    Sodium 136 08/13/2018 12:51 PM    Potassium 3.7 08/13/2018 12:51 PM    Chloride 98 08/13/2018 12:51 PM CO2 28 08/13/2018 12:51 PM    Anion gap 10 08/13/2018 12:51 PM    Glucose 149 (H) 08/13/2018 12:51 PM    BUN 15 08/13/2018 12:51 PM    Creatinine 1.03 08/13/2018 12:51 PM    BUN/Creatinine ratio 15 08/13/2018 12:51 PM    GFR est AA >60 08/13/2018 12:51 PM    GFR est non-AA >60 08/13/2018 12:51 PM    Calcium 8.8 08/13/2018 12:51 PM      Wt Readings from Last 3 Encounters:   02/25/19 189 lb 6.4 oz (85.9 kg)   08/23/18 193 lb 9.6 oz (87.8 kg)   08/13/18 192 lb 3.9 oz (87.2 kg)      BP Readings from Last 3 Encounters:   02/25/19 150/68   08/23/18 148/78   08/13/18 108/57      Current Outpatient Medications   Medication Sig    rosuvastatin (CRESTOR) 40 mg tablet TAKE 1 TABLET BY MOUTH EVERYDAY AT BEDTIME    losartan (COZAAR) 50 mg tablet Take 1 Tab by mouth daily.  multivitamin (ONE A DAY) tablet Take 1 Tab by mouth daily.  clopidogrel (PLAVIX) 75 mg tab Take 1 Tab by mouth daily.  nitroglycerin (NITROSTAT) 0.4 mg SL tablet 1 Tab by SubLINGual route every five (5) minutes as needed for Chest Pain.  aspirin delayed-release 81 mg tablet Take 81 mg by mouth daily.  levothyroxine (SYNTHROID) 100 mcg tablet Take 100 mcg by mouth Daily (before breakfast).  diclofenac (VOLTAREN) 1 % gel Apply 2-4 g to affected area four (4) times daily as needed (joint pain).  zolpidem (AMBIEN) 10 mg tablet Take 10 mg by mouth nightly as needed for Sleep. No current facility-administered medications for this visit. Impression see above.

## 2019-02-25 NOTE — PATIENT INSTRUCTIONS
You will be scheduled for a stress echocardiogram and follow up with  in 6 months. Please wear comfortable clothing (shorts or pants with a shirt or blouse) and walking/athletic shoes.   Do not eat or drink anything, except water, for at least 2 hours prior to your test.  Do take your scheduled medications prior to your test.

## 2019-04-06 DIAGNOSIS — I25.10 CORONARY ARTERY DISEASE INVOLVING NATIVE CORONARY ARTERY OF NATIVE HEART WITHOUT ANGINA PECTORIS: ICD-10-CM

## 2019-04-08 RX ORDER — ROSUVASTATIN CALCIUM 40 MG/1
TABLET, COATED ORAL
Qty: 90 TAB | Refills: 3 | Status: SHIPPED | OUTPATIENT
Start: 2019-04-08 | End: 2020-04-15

## 2019-04-08 NOTE — TELEPHONE ENCOUNTER
Request for Crestor 40mg daily. Last office visit 2-25-19, next office visit 8-15-19.  Refills per verbal order from Dr. Kelly Ross.

## 2019-04-22 DIAGNOSIS — I25.10 CORONARY ARTERY DISEASE INVOLVING NATIVE CORONARY ARTERY OF NATIVE HEART WITHOUT ANGINA PECTORIS: Primary | ICD-10-CM

## 2019-04-25 RX ORDER — CLOPIDOGREL BISULFATE 75 MG/1
TABLET ORAL
Qty: 90 TAB | Refills: 3 | OUTPATIENT
Start: 2019-04-25

## 2019-04-25 RX ORDER — CLOPIDOGREL BISULFATE 75 MG/1
75 TABLET ORAL DAILY
Qty: 90 TAB | Refills: 3 | Status: SHIPPED | OUTPATIENT
Start: 2019-04-25 | End: 2020-04-13 | Stop reason: SDUPTHER

## 2019-04-25 NOTE — TELEPHONE ENCOUNTER
Request for plavix 75mg daily. Last office visit 2-25-19, next office visit 8-15-19.  Refills per verbal order from Dr. Dhiraj Cooper.

## 2019-05-18 ENCOUNTER — HOSPITAL ENCOUNTER (EMERGENCY)
Age: 69
Discharge: HOME | End: 2019-05-18
Attending: EMERGENCY MEDICINE
Payer: MEDICARE

## 2019-05-18 VITALS
BODY MASS INDEX: 27.76 KG/M2 | DIASTOLIC BLOOD PRESSURE: 91 MMHG | HEART RATE: 62 BPM | RESPIRATION RATE: 16 BRPM | SYSTOLIC BLOOD PRESSURE: 152 MMHG | TEMPERATURE: 97.8 F | OXYGEN SATURATION: 99 % | WEIGHT: 187.39 LBS | HEIGHT: 69 IN

## 2019-05-18 DIAGNOSIS — M76.892 TENDINITIS INVOLVING LEFT HIP ABDUCTORS: Primary | ICD-10-CM

## 2019-05-18 PROCEDURE — 99282 EMERGENCY DEPT VISIT SF MDM: CPT

## 2019-05-18 PROCEDURE — 74011000250 HC RX REV CODE- 250: Performed by: EMERGENCY MEDICINE

## 2019-05-18 PROCEDURE — 75810000123 HC INJ'S ANES/STEROID AGT PERIPH NERVE

## 2019-05-18 PROCEDURE — 74011250637 HC RX REV CODE- 250/637: Performed by: EMERGENCY MEDICINE

## 2019-05-18 RX ORDER — BUPIVACAINE HYDROCHLORIDE 5 MG/ML
10 INJECTION, SOLUTION EPIDURAL; INTRACAUDAL
Status: COMPLETED | OUTPATIENT
Start: 2019-05-18 | End: 2019-05-18

## 2019-05-18 RX ORDER — ACETAMINOPHEN 500 MG
1000 TABLET ORAL
Qty: 20 TAB | Refills: 0 | Status: SHIPPED | OUTPATIENT
Start: 2019-05-18

## 2019-05-18 RX ORDER — NAPROXEN 500 MG/1
500 TABLET ORAL 2 TIMES DAILY WITH MEALS
Qty: 10 TAB | Refills: 0 | Status: SHIPPED | OUTPATIENT
Start: 2019-05-18 | End: 2019-05-23

## 2019-05-18 RX ORDER — ACETAMINOPHEN 500 MG
1000 TABLET ORAL
Status: COMPLETED | OUTPATIENT
Start: 2019-05-18 | End: 2019-05-18

## 2019-05-18 RX ORDER — NAPROXEN 250 MG/1
500 TABLET ORAL
Status: COMPLETED | OUTPATIENT
Start: 2019-05-18 | End: 2019-05-18

## 2019-05-18 RX ADMIN — BUPIVACAINE HYDROCHLORIDE 50 MG: 5 INJECTION, SOLUTION EPIDURAL; INTRACAUDAL at 08:07

## 2019-05-18 RX ADMIN — ACETAMINOPHEN 1000 MG: 500 TABLET ORAL at 08:07

## 2019-05-18 RX ADMIN — NAPROXEN 500 MG: 250 TABLET ORAL at 08:07

## 2019-05-18 NOTE — ED NOTES
Patient was discharged and given instructions by Dr. Luis Chandra. Patient verbalized good understanding of all discharge instructions, prescriptions and f/u care. All questions answered. Pt in stable condition on discharge.

## 2019-05-18 NOTE — ED TRIAGE NOTES
Pt arrives ambulatory to ed with complaints of left hip pain x 1 day. Pt states unable to bear weight. Denies any trauma or injury. Pt states he took tylenol last night without relief.

## 2019-05-19 NOTE — ED PROVIDER NOTES
The history is provided by the patient and the spouse. Hip Pain This is a new problem. The current episode started yesterday. The problem occurs constantly. The problem has been rapidly worsening. Pain location: left lateral posterior hip. The pain is moderate. Associated symptoms comments: Pain with weight bearing. He has tried nothing for the symptoms. Past Medical History:  
Diagnosis Date  Hyperlipidemia  Hypertension  Hyperthyroidism   
 hyperthyroidism  Non-STEMI (non-ST elevated myocardial infarction) ([facility] [address] 04/30/2018 KELLY to mid circ Past Surgical History:  
Procedure Laterality Date  HX ORTHOPAEDIC  09/2013  
 bilateral knee replacement  HX SHOULDER ARTHROSCOPY  2011 History reviewed. No pertinent family history. Social History Socioeconomic History  Marital status:  Spouse name: Not on file  Number of children: Not on file  Years of education: Not on file  Highest education level: Not on file Occupational History  Not on file Social Needs  Financial resource strain: Not on file  Food insecurity:  
  Worry: Not on file Inability: Not on file  Transportation needs:  
  Medical: Not on file Non-medical: Not on file Tobacco Use  Smoking status: Never Smoker  Smokeless tobacco: Never Used Substance and Sexual Activity  Alcohol use: Yes Alcohol/week: 6.0 oz Types: 10 Shots of liquor per week Comment: 2 shots/day  Drug use: No  
 Sexual activity: Not on file Lifestyle  Physical activity:  
  Days per week: Not on file Minutes per session: Not on file  Stress: Not on file Relationships  Social connections:  
  Talks on phone: Not on file Gets together: Not on file Attends [employer] service: Not on file Active member of club or organization: Not on file Attends meetings of clubs or organizations: Not on file Relationship status: Not on file  Intimate partner violence:  
  Fear of current or ex partner: Not on file Emotionally abused: Not on file Physically abused: Not on file Forced sexual activity: Not on file Other Topics Concern  Not on file Social History Narrative  Not on file ALLERGIES: Patient has no known allergies. Review of Systems All other systems reviewed and are negative. Vitals:  
 05/18/19 6377 BP: (!) 152/91 Pulse: 62 Resp: 16 Temp: 97.8 °F (36.6 °C) SpO2: 99% Weight: 85 kg (187 lb 6.3 oz) Height: 5' 9\" (1.753 m) Physical Exam  
Constitutional: He appears well-developed and well-nourished. No distress. HENT:  
Head: Normocephalic and atraumatic. Eyes: Conjunctivae are normal.  
Neck: Neck supple. No tracheal deviation present. Cardiovascular: Normal rate and regular rhythm. Pulmonary/Chest: Effort normal. No respiratory distress. Abdominal: He exhibits no distension. Musculoskeletal: Normal range of motion. He exhibits no deformity. Pain with passive ROM on L hip adduction and with palpation of lateral proximal hip and inferior gluteal muscles Neurological: He is alert. No cranial nerve deficit. Skin: Skin is warm and dry. Psychiatric: His behavior is normal.  
Nursing note and vitals reviewed. MDM 
  
70 y.o. male presents with hip abductor tendonitis on left side from crouching to work on something yesterday and overusing it. Local anaesthesia provided good results. Patient was recommended to take short course of scheduled NSAIDs and engage in early mobility as definitive treatment. Plan to follow up with PCP as needed and return precautions discussed for worsening or new concerning symptoms. Procedures Procedure Note: Trigger Point Injection for Myofascial pain Performed by Adam Rodarte MD 
Indication: muscle/myofascial pain Muscle body and tendon sheath of the left hip abductor muscle(s) were injected with 0.5% bupivacaine under sterile technique for release of muscle spasm/pain. Patient tolerated well with immediate improvement of symptoms and no immediate complications following procedure. CPT Code:  
 
1 or 2 muscle bodies: 46552

## 2019-06-13 ENCOUNTER — APPOINTMENT (OUTPATIENT)
Dept: CT IMAGING | Age: 69
End: 2019-06-13
Attending: EMERGENCY MEDICINE
Payer: MEDICARE

## 2019-06-13 ENCOUNTER — HOSPITAL ENCOUNTER (EMERGENCY)
Age: 69
Discharge: HOME | End: 2019-06-13
Attending: EMERGENCY MEDICINE
Payer: MEDICARE

## 2019-06-13 VITALS
BODY MASS INDEX: 27.82 KG/M2 | WEIGHT: 187.83 LBS | RESPIRATION RATE: 14 BRPM | TEMPERATURE: 98.4 F | OXYGEN SATURATION: 96 % | HEART RATE: 55 BPM | DIASTOLIC BLOOD PRESSURE: 65 MMHG | HEIGHT: 69 IN | SYSTOLIC BLOOD PRESSURE: 127 MMHG

## 2019-06-13 DIAGNOSIS — R53.83 FATIGUE, UNSPECIFIED TYPE: Primary | ICD-10-CM

## 2019-06-13 DIAGNOSIS — R53.1 GENERALIZED WEAKNESS: ICD-10-CM

## 2019-06-13 LAB
ALBUMIN SERPL-MCNC: 3.8 G/DL (ref 3.5–5)
ALBUMIN/GLOB SERPL: 1.2 {RATIO} (ref 1.1–2.2)
ALP SERPL-CCNC: 71 U/L (ref 45–117)
ALT SERPL-CCNC: 43 U/L (ref 12–78)
ANION GAP SERPL CALC-SCNC: 15 MMOL/L (ref 5–15)
AST SERPL-CCNC: 61 U/L (ref 15–37)
ATRIAL RATE: 57 BPM
BASOPHILS # BLD: 0 K/UL (ref 0–0.1)
BASOPHILS NFR BLD: 1 % (ref 0–1)
BILIRUB SERPL-MCNC: 0.6 MG/DL (ref 0.2–1)
BUN SERPL-MCNC: 17 MG/DL (ref 6–20)
BUN/CREAT SERPL: 16 (ref 12–20)
CALCIUM SERPL-MCNC: 8.6 MG/DL (ref 8.5–10.1)
CALCULATED P AXIS, ECG09: 30 DEGREES
CALCULATED R AXIS, ECG10: 40 DEGREES
CALCULATED T AXIS, ECG11: 60 DEGREES
CHLORIDE SERPL-SCNC: 102 MMOL/L (ref 97–108)
CO2 SERPL-SCNC: 23 MMOL/L (ref 21–32)
COMMENT, HOLDF: NORMAL
CREAT SERPL-MCNC: 1.05 MG/DL (ref 0.7–1.3)
DIAGNOSIS, 93000: NORMAL
DIFFERENTIAL METHOD BLD: ABNORMAL
EOSINOPHIL # BLD: 0.1 K/UL (ref 0–0.4)
EOSINOPHIL NFR BLD: 1 % (ref 0–7)
ERYTHROCYTE [DISTWIDTH] IN BLOOD BY AUTOMATED COUNT: 13.3 % (ref 11.5–14.5)
GLOBULIN SER CALC-MCNC: 3.3 G/DL (ref 2–4)
GLUCOSE SERPL-MCNC: 109 MG/DL (ref 65–100)
HCT VFR BLD AUTO: 42.9 % (ref 36.6–50.3)
HGB BLD-MCNC: 14.2 G/DL (ref 12.1–17)
IMM GRANULOCYTES # BLD AUTO: 0 K/UL (ref 0–0.04)
IMM GRANULOCYTES NFR BLD AUTO: 0 % (ref 0–0.5)
INR PPP: 1 (ref 0.9–1.1)
LYMPHOCYTES # BLD: 1 K/UL (ref 0.8–3.5)
LYMPHOCYTES NFR BLD: 16 % (ref 12–49)
MAGNESIUM SERPL-MCNC: 1.8 MG/DL (ref 1.6–2.4)
MCH RBC QN AUTO: 31.2 PG (ref 26–34)
MCHC RBC AUTO-ENTMCNC: 33.1 G/DL (ref 30–36.5)
MCV RBC AUTO: 94.3 FL (ref 80–99)
MONOCYTES # BLD: 0.3 K/UL (ref 0–1)
MONOCYTES NFR BLD: 6 % (ref 5–13)
NEUTS SEG # BLD: 4.7 K/UL (ref 1.8–8)
NEUTS SEG NFR BLD: 76 % (ref 32–75)
NRBC # BLD: 0 K/UL (ref 0–0.01)
NRBC BLD-RTO: 0 PER 100 WBC
P-R INTERVAL, ECG05: 160 MS
PLATELET # BLD AUTO: 170 K/UL (ref 150–400)
PMV BLD AUTO: 11.2 FL (ref 8.9–12.9)
POTASSIUM SERPL-SCNC: 4.1 MMOL/L (ref 3.5–5.1)
PROT SERPL-MCNC: 7.1 G/DL (ref 6.4–8.2)
PROTHROMBIN TIME: 9.4 SEC (ref 9–11.1)
Q-T INTERVAL, ECG07: 438 MS
QRS DURATION, ECG06: 102 MS
QTC CALCULATION (BEZET), ECG08: 426 MS
RBC # BLD AUTO: 4.55 M/UL (ref 4.1–5.7)
SAMPLES BEING HELD,HOLD: NORMAL
SODIUM SERPL-SCNC: 140 MMOL/L (ref 136–145)
T3FREE SERPL-MCNC: 2.8 PG/ML (ref 2.2–4)
TROPONIN I SERPL-MCNC: <0.05 NG/ML
TSH SERPL DL<=0.05 MIU/L-ACNC: 0.44 UIU/ML (ref 0.36–3.74)
VENTRICULAR RATE, ECG03: 57 BPM
WBC # BLD AUTO: 6.1 K/UL (ref 4.1–11.1)

## 2019-06-13 PROCEDURE — 84481 FREE ASSAY (FT-3): CPT

## 2019-06-13 PROCEDURE — 99285 EMERGENCY DEPT VISIT HI MDM: CPT

## 2019-06-13 PROCEDURE — 84484 ASSAY OF TROPONIN QUANT: CPT

## 2019-06-13 PROCEDURE — 96361 HYDRATE IV INFUSION ADD-ON: CPT

## 2019-06-13 PROCEDURE — 85610 PROTHROMBIN TIME: CPT

## 2019-06-13 PROCEDURE — 84443 ASSAY THYROID STIM HORMONE: CPT

## 2019-06-13 PROCEDURE — 93005 ELECTROCARDIOGRAM TRACING: CPT

## 2019-06-13 PROCEDURE — 36415 COLL VENOUS BLD VENIPUNCTURE: CPT

## 2019-06-13 PROCEDURE — 80053 COMPREHEN METABOLIC PANEL: CPT

## 2019-06-13 PROCEDURE — 70450 CT HEAD/BRAIN W/O DYE: CPT

## 2019-06-13 PROCEDURE — 83735 ASSAY OF MAGNESIUM: CPT

## 2019-06-13 PROCEDURE — 74011250636 HC RX REV CODE- 250/636: Performed by: EMERGENCY MEDICINE

## 2019-06-13 PROCEDURE — 96360 HYDRATION IV INFUSION INIT: CPT

## 2019-06-13 PROCEDURE — 85025 COMPLETE CBC W/AUTO DIFF WBC: CPT

## 2019-06-13 RX ORDER — MELOXICAM 15 MG/1
15 TABLET ORAL DAILY
COMMUNITY
End: 2019-10-04

## 2019-06-13 RX ORDER — VALACYCLOVIR HYDROCHLORIDE 1 G/1
TABLET, FILM COATED ORAL
COMMUNITY

## 2019-06-13 RX ADMIN — SODIUM CHLORIDE 1000 ML: 900 INJECTION, SOLUTION INTRAVENOUS at 12:40

## 2019-06-13 NOTE — ED PROVIDER NOTES
HPI     Pt is 70 y.o. M with PMH of HTN, Hyperthyroid, NSTEMI with stents presenting to ED with c/o generalized weakness, fatigue, shakiness, dizziness and lightheaded that started acutely about 1 hour ago. Pt was unsure what was going on and remembered he had MI 1 yr ago thus took 1 SL NTG. However, he denies ever having chest pain today. No dyspnea. He does have nausea but no vomiting. No headache or change in vision. No other complaints at this time. Past Medical History:   Diagnosis Date    Heart attack ([facility] [address]     Hyperlipidemia     Hypertension     Hyperthyroidism     hyperthyroidism    Non-STEMI (non-ST elevated myocardial infarction) ([facility] [address] 04/30/2018    KELLY to mid circ       Past Surgical History:   Procedure Laterality Date    CARDIAC SURG PROCEDURE UNLIST      Stent -  Maker    HX ORTHOPAEDIC  09/2013    bilateral knee replacement    HX ORTHOPAEDIC      Right achilles    HX SHOULDER ARTHROSCOPY  2011    Bilateral Shoulders         History reviewed. No pertinent family history. Social History     Socioeconomic History    Marital status:      Spouse name: Not on file    Number of children: Not on file    Years of education: Not on file    Highest education level: Not on file   Occupational History    Not on file   Social Needs    Financial resource strain: Not on file    Food insecurity:     Worry: Not on file     Inability: Not on file    Transportation needs:     Medical: Not on file     Non-medical: Not on file   Tobacco Use    Smoking status: Never Smoker    Smokeless tobacco: Never Used   Substance and Sexual Activity    Alcohol use:  Yes     Alcohol/week: 6.0 oz     Types: 10 Shots of liquor per week     Comment: 2 shots/day    Drug use: No    Sexual activity: Not on file   Lifestyle    Physical activity:     Days per week: Not on file     Minutes per session: Not on file    Stress: Not on file   Relationships    Social connections:     Talks on phone: Not on file     Gets together: Not on file     Attends Faith service: Not on file     Active member of club or organization: Not on file     Attends meetings of clubs or organizations: Not on file     Relationship status: Not on file    Intimate partner violence:     Fear of current or ex partner: Not on file     Emotionally abused: Not on file     Physically abused: Not on file     Forced sexual activity: Not on file   Other Topics Concern    Not on file   Social History Narrative    Not on file         ALLERGIES: Patient has no known allergies. Review of Systems   Constitutional: Positive for fatigue. Respiratory: Negative for shortness of breath. Cardiovascular: Negative for chest pain and leg swelling. Gastrointestinal: Positive for nausea. Negative for abdominal pain. Musculoskeletal: Negative for back pain. Neurological: Positive for dizziness, tremors, weakness and light-headedness. Negative for headaches. Vitals:    06/13/19 1211   Pulse: 65   Resp: 18   Temp: 98.2 °F (36.8 °C)   SpO2: 99%   Weight: 85.2 kg (187 lb 13.3 oz)   Height: 5' 9\" (1.753 m)            Physical Exam   Constitutional: He is oriented to person, place, and time. He appears well-developed and well-nourished. No distress. HENT:   Head: Normocephalic. Mouth/Throat: Oropharynx is clear and moist.   Eyes: Pupils are equal, round, and reactive to light. Conjunctivae and EOM are normal.   Neck: Normal range of motion. Neck supple. No JVD present. Cardiovascular: Regular rhythm, normal heart sounds and intact distal pulses. Bradycardia present. Pulmonary/Chest: Effort normal and breath sounds normal.   Abdominal: Soft. Bowel sounds are normal. He exhibits no distension. There is no tenderness. Musculoskeletal: Normal range of motion. He exhibits no edema, tenderness or deformity. Lymphadenopathy:     He has no cervical adenopathy. Neurological: He is alert and oriented to person, place, and time.  He has normal strength. No cranial nerve deficit or sensory deficit. He exhibits normal muscle tone. He displays a negative Romberg sign. Coordination normal. GCS eye subscore is 4. GCS verbal subscore is 5. GCS motor subscore is 6. Skin: Skin is warm and dry. Capillary refill takes less than 2 seconds. No rash noted. He is not diaphoretic. No erythema. Psychiatric: He has a normal mood and affect. Nursing note and vitals reviewed. MDM       Procedures      ED EKG interpretation:  Rhythm: sinus bradycardia; and regular . Rate (approx.): 57; Axis: normal; P wave: normal; QRS interval: normal ; ST/T wave: normal;  EKG documented by Carol Dougherty MD, scribe, as interpreted by Antonio Boykin MD, ED MD.      1:20 PM  Re-eval: discussed CT and all labs except thyroid studies that are still pending. Pt feeling a little better after IVF which are still infusing. 1:54 PM  Feeling better. Will dc. Pt advised to follow up with PCP. Return precautions given. 1:54 PM  Patient's results have been reviewed with them. Patient and/or family have verbally conveyed their understanding and agreement of the patient's signs, symptoms, diagnosis, treatment and prognosis and additionally agree to follow up as recommended or return to the Emergency Room should their condition change prior to follow-up. Discharge instructions have also been provided to the patient with some educational information regarding their diagnosis as well a list of reasons why they would want to return to the ER prior to their follow-up appointment should their condition change.     Carol Dougherty MD

## 2019-06-13 NOTE — ED NOTES
Daughter reports calling her father this morning around 6 AM and noted his speech was \"slurred\". Patient reports a 4/10 headache. +nausea. Patient reports all symptoms started around 10:30 AM today. Speech clear upon arrival.  for EMS. VAN scaled negative upon arrival. NIH 0. MD at bedside for evaluation.

## 2019-06-13 NOTE — DISCHARGE INSTRUCTIONS
Patient Education        Fatigue: Care Instructions  Your Care Instructions    Fatigue is a feeling of tiredness, exhaustion, or lack of energy. You may feel fatigue because of too much or not enough activity. It can also come from stress, lack of sleep, boredom, and poor diet. Many medical problems, such as viral infections, can cause fatigue. Emotional problems, especially depression, are often the cause of fatigue. Fatigue is most often a symptom of another problem. Treatment for fatigue depends on the cause. For example, if you have fatigue because you have a certain health problem, treating this problem also treats your fatigue. If depression or anxiety is the cause, treatment may help. Follow-up care is a key part of your treatment and safety. Be sure to make and go to all appointments, and call your doctor if you are having problems. It's also a good idea to know your test results and keep a list of the medicines you take. How can you care for yourself at home? · Get regular exercise. But don't overdo it. Go back and forth between rest and exercise. · Get plenty of rest.  · Eat a healthy diet. Do not skip meals, especially breakfast.  · Reduce your use of caffeine, tobacco, and alcohol. Caffeine is most often found in coffee, tea, cola drinks, and chocolate. · Limit medicines that can cause fatigue. This includes tranquilizers and cold and allergy medicines. When should you call for help? Watch closely for changes in your health, and be sure to contact your doctor if:    · You have new symptoms such as fever or a rash.     · Your fatigue gets worse.     · You have been feeling down, depressed, or hopeless. Or you may have lost interest in things that you usually enjoy.     · You are not getting better as expected. Where can you learn more? Go to http://erick-bernabe.info/. Enter J571 in the search box to learn more about \"Fatigue: Care Instructions. \"  Current as of: September 23, 2018  Content Version: 11.9  © 1243-6549 [employer]. Care instructions adapted under license by [employer] (which disclaims liability or warranty for this information). If you have questions about a medical condition or this instruction, always ask your healthcare professional. [address] any warranty or liability for your use of this information. Patient Education        Weakness: Care Instructions  Your Care Instructions    Weakness is a lack of physical or muscle strength. You may feel that you need to make extra effort to move your arms, legs, or other muscles. Generalized weakness means that you feel weak in most areas of your body. Another type of weakness may affect just one muscle or group of muscles. You may feel weak and tired after you have done too much activity, such as taking an extra-long hike. This is not a serious problem. It often goes away on its own. Feeling weak can also be caused by medical conditions like thyroid problems, depression, or a virus. Sometimes the cause can be serious. Your doctor may want to do more tests to try to find the cause of the weakness. The doctor has checked you carefully, but problems can develop later. If you notice any problems or new symptoms, get medical treatment right away. Follow-up care is a key part of your treatment and safety. Be sure to make and go to all appointments, and call your doctor if you are having problems. It's also a good idea to know your test results and keep a list of the medicines you take. How can you care for yourself at home? · Rest when you feel tired. · Be safe with medicines. If your doctor prescribed medicine, take it exactly as prescribed. Call your doctor if you think you are having a problem with your medicine. You will get more details on the specific medicines your doctor prescribes. · Do not skip meals.  Eating a balanced diet may increase your energy level.  · Get some physical activity every day, but do not get too tired. When should you call for help? Call your doctor now or seek immediate medical care if:    · You have new or worse weakness.     · You are dizzy or lightheaded, or you feel like you may faint.    Watch closely for changes in your health, and be sure to contact your doctor if:    · You do not get better as expected. Where can you learn more? Go to http://erick-bernabe.info/. Enter 164 [id number] [id number] in the search box to learn more about \"Weakness: Care Instructions. \"  Current as of: September 23, 2018  Content Version: 11.9  © 8584-1587 [employer]. Care instructions adapted under license by [employer] (which disclaims liability or warranty for this information). If you have questions about a medical condition or this instruction, always ask your healthcare professional. [address] any warranty or liability for your use of this information.

## 2019-06-13 NOTE — ED NOTES
Provided patient with a warm blanket. Lights dimmed for comfort. Updated on plan of care. Verbalizes understanding. Denies any questions or concerns at this time.

## 2019-06-13 NOTE — ED TRIAGE NOTES
Triage Note: Patient arrives by EMS from home for generalized weakness that started this morning. Patient reports taking 1 Nitro even though he denies chest pain. Hx of a heart attack 1 year ago.

## 2019-10-04 ENCOUNTER — OFFICE VISIT (OUTPATIENT)
Dept: CARDIOLOGY CLINIC | Age: 69
End: 2019-10-04

## 2019-10-04 VITALS
BODY MASS INDEX: 27.64 KG/M2 | HEART RATE: 61 BPM | RESPIRATION RATE: 12 BRPM | HEIGHT: 69 IN | SYSTOLIC BLOOD PRESSURE: 140 MMHG | WEIGHT: 186.6 LBS | DIASTOLIC BLOOD PRESSURE: 80 MMHG | OXYGEN SATURATION: 96 %

## 2019-10-04 DIAGNOSIS — E78.00 PURE HYPERCHOLESTEROLEMIA: ICD-10-CM

## 2019-10-04 DIAGNOSIS — I25.10 CORONARY ARTERY DISEASE INVOLVING NATIVE CORONARY ARTERY OF NATIVE HEART WITHOUT ANGINA PECTORIS: Primary | ICD-10-CM

## 2019-10-04 DIAGNOSIS — I25.2 HX OF MYOCARDIAL INFARCTION: ICD-10-CM

## 2019-10-04 DIAGNOSIS — I10 ESSENTIAL HYPERTENSION: ICD-10-CM

## 2019-10-04 DIAGNOSIS — I25.2 H/O NON-ST ELEVATION MYOCARDIAL INFARCTION (NSTEMI): ICD-10-CM

## 2019-10-04 RX ORDER — NITROGLYCERIN 0.4 MG/1
0.4 TABLET SUBLINGUAL
Qty: 1 BOTTLE | Refills: 3 | Status: SHIPPED | OUTPATIENT
Start: 2019-10-04

## 2019-10-04 NOTE — PROGRESS NOTES
Chief Complaint   Patient presents with    Follow-up     with stress echocardiogram.  Denies chest pain/dizziness/swelling. Continued shortness of breath/balance issues.        Visit Vitals  /80   Pulse 61   Resp 12   Ht 5' 9\" (1.753 m)   Wt 186 lb 9.6 oz (84.6 kg)   SpO2 96%   BMI 27.56 kg/m²

## 2019-10-04 NOTE — PROGRESS NOTES
Robyn Lynne     [birth year]       Kamille Carl MD, [facility] [place]  Date of Visit-10/4/2019   PCP is Yassine Crawford MD   [facility]  [facility]  HPI:  Robyn Lynne is a   78 y.o. male  10 m f/u   Here with stress echo done today , prelim is reviewed and pictures viewed with patient by echo viewer    hospitalized April 30th, 2018  with NSTEMI with diffuse disease. 3-4 episodes stuttering substernal chest pain  First troponin abnormal at 0.36 with shortness of breath some with exertion   ILBBB  4-30-18 [facility] Findings: left main ok. Lad 50% mid after d2, second tubular lesion after d2. lcx tubular ostial 40%, 50% discrete mid lcx after om2, followed by discrete 99%. 60% tubular distal lcx between om2 and 3. rca is large, scattered irregs with 50% mid pda. 2.25 by 12mm rohini to 9atm, at 99% mid lcx  Echo 4-30-18 = EF 55-60%,  Ca++ on AV  He had a ROHINI to the circumflex,  Has hx of HTN, and previous heavy use of alcohol. Today. .. Overall the pt states he is doing well. Pt notes that his daughter thought he was having a heart issue and was seen in the emergency room in June 2019, however he was just dehydrated. There was suggestion of slurred speech but work up was negative, ER notes reviewed. He had a stress echo today, walked 11 minutes. Pt also reports that he experienced some dizziness earlier this year. Rare dizziness but not orthostasis  Denies chest pain, edema, syncope or shortness of breath at rest   Has no tachycardia , palpitations or sense of arrythmia       Assessment/Plan:     1. Coronary artery disease involving native coronary artery of native heart without angina pectoris  Did very well on stress echo today. 18 months out from PCI. Discussed DAPT he has some minor bruising but we agreed to continue for one more year. His and our concern was also the residual 50% lesions.  But stress echo is very reassuring in wall motion. 2. Essential hypertension  Stable  BP Readings from Last 6 Encounters:   10/04/19 140/80   10/04/19 160/72   06/13/19 127/65   05/18/19 (!) 152/91   02/25/19 150/68   08/23/18 148/78         3. Pure hypercholesterolemia-at goal  Lipids on high potency statin as appropriate for secondary prevention. Lab Results   Component Value Date/Time    LDL, calculated 69 05/01/2018 03:25 AM      4. Hx of myocardial infarction-NSTEMI      F/u in 6 months. Future Appointments   Date Time Provider Scotty Henderson   4/2/2020  1:20 PM Rosezetta Leyden, MD [place]      Patient Instructions   We will see you back in 6 months. Key CAD CHF Meds             clopidogrel (PLAVIX) 75 mg tab (Taking) Take 1 Tab by mouth daily. rosuvastatin (CRESTOR) 40 mg tablet (Taking) TAKE 1 TABLET BY MOUTH EVERYDAY AT BEDTIME    losartan (COZAAR) 50 mg tablet (Taking) Take 1 Tab by mouth daily. nitroglycerin (NITROSTAT) 0.4 mg SL tablet (Taking) 1 Tab by SubLINGual route every five (5) minutes as needed for Chest Pain. aspirin delayed-release 81 mg tablet (Taking) Take 81 mg by mouth daily. Impression:   1. Coronary artery disease involving native coronary artery of native heart without angina pectoris    2. Essential hypertension    3. Pure hypercholesterolemia    4. Hx of myocardial infarction       Cardiac History:   No specialty comments available. ROS-except as noted above. . A complete cardiac and respiratory are reviewed and negative except as above ; Resp-denies wheezing  or productive cough,.  Const- No unusual weight loss or fever; Neuro-no recent seizure or CVA ; GI- No BRBPR, abdom pain, bloating ; - no  hematuria   see supplement sheet, initialed and to be scanned by staff  Past Medical History:   Diagnosis Date    Heart [facility] ([facility] [address]     Hyperlipidemia     Hypertension     Hyperthyroidism     hyperthyroidism    Non-STEMI (non-ST elevated myocardial infarction) ([facility] [address] 04/30/2018    KELLY to mid circ      Social Hx= reports that he has never smoked. He has never used smokeless tobacco. He reports that he drinks about 10.0 standard drinks of alcohol per week. He reports that he does not use drugs. Exam and Labs:    Visit Vitals  /80   Pulse 61   Resp 12   Ht 5' 9\" (1.753 m)   Wt 186 lb 9.6 oz (84.6 kg)   SpO2 96%   BMI 27.56 kg/m²    @Constitutional:  NAD, comfortable  Head: NC,AT. Eyes: No scleral icterus. Neck:  Neck supple. No JVD present. Throat: moist mucous membranes. Chest: Effort normal & normal respiratory excursion . Neurological: alert, conversant and oriented . Skin: Skin is not cold. No obvious systemic rash noted. Not diaphoretic. No erythema. Psychiatric:  Grossly normal mood and affect. Behavior appears normal. Extremities:  no clubbing or cyanosis. Abdomen: non distended    Lungs:  breath sounds normal. No stridor. distress, wheezes or  Rales. Heart:  normal rate, regular rhythm, normal S1, S2, no murmurs, rubs, clicks or gallops, PMI non displaced. Edema:  Edema is none.   Lab Results   Component Value Date/Time    Cholesterol, total 169 05/01/2018 03:25 AM    HDL Cholesterol 82 05/01/2018 03:25 AM    LDL, calculated 69 05/01/2018 03:25 AM    Triglyceride 90 05/01/2018 03:25 AM    CHOL/HDL Ratio 2.1 05/01/2018 03:25 AM     Lab Results   Component Value Date/Time    Sodium 140 06/13/2019 12:17 PM    Potassium 4.1 06/13/2019 12:17 PM    Chloride 102 06/13/2019 12:17 PM    CO2 23 06/13/2019 12:17 PM    Anion gap 15 06/13/2019 12:17 PM    Glucose 109 (H) 06/13/2019 12:17 PM    BUN 17 06/13/2019 12:17 PM    Creatinine 1.05 06/13/2019 12:17 PM    BUN/Creatinine ratio 16 06/13/2019 12:17 PM    GFR est AA >60 06/13/2019 12:17 PM    GFR est non-AA >60 06/13/2019 12:17 PM    Calcium 8.6 06/13/2019 12:17 PM      Wt Readings from Last 3 Encounters:   10/04/19 186 lb 9.6 oz (84.6 kg)   10/04/19 187 lb (84.8 kg)   06/13/19 187 lb 13.3 oz (85.2 kg)      BP Readings from Last 3 Encounters:   10/04/19 140/80   10/04/19 160/72   06/13/19 127/65      Current Outpatient Medications   Medication Sig    valACYclovir (VALTREX) 1 gram tablet Take  by mouth two (2) times daily as needed.  acetaminophen (TYLENOL) 500 mg tablet Take 2 Tabs by mouth every six (6) hours as needed for Pain.  clopidogrel (PLAVIX) 75 mg tab Take 1 Tab by mouth daily.  rosuvastatin (CRESTOR) 40 mg tablet TAKE 1 TABLET BY MOUTH EVERYDAY AT BEDTIME    losartan (COZAAR) 50 mg tablet Take 1 Tab by mouth daily. (Patient taking differently: Take 50 mg by mouth daily. Thinks he is taking 100 mg daily.)    multivitamin (ONE A DAY) tablet Take 1 Tab by mouth daily.  nitroglycerin (NITROSTAT) 0.4 mg SL tablet 1 Tab by SubLINGual route every five (5) minutes as needed for Chest Pain.  aspirin delayed-release 81 mg tablet Take 81 mg by mouth daily.  levothyroxine (SYNTHROID) 100 mcg tablet Take 100 mcg by mouth Daily (before breakfast).  diclofenac (VOLTAREN) 1 % gel Apply 2-4 g to affected area four (4) times daily as needed (joint pain).  zolpidem (AMBIEN) 10 mg tablet Take 10 mg by mouth nightly as needed for Sleep. No current facility-administered medications for this visit. Impression see above.      Written by Genia White, as dictated by Azalia Liao MD.

## 2019-10-04 NOTE — Clinical Note
10/4/19 Patient: Bridgette Ann YOB: 1950 Date of Visit: 10/4/2019 Diana Prabhakar MD 
[facility] [address] [facility] [address] [id number] VIA Facsimile: [phone] Dear Diana Prabhakar MD, Thank you for referring Mr. Roxie Blizzard to [address]  for evaluation. My notes for this consultation are attached. If you have questions, please do not hesitate to call me. I look forward to following your patient along with you.  
 
 
Sincerely, 
 
Olimpia Santana MD

## 2019-10-06 PROBLEM — I25.10 CAD (CORONARY ARTERY DISEASE): Status: RESOLVED | Noted: 2018-04-30 | Resolved: 2019-10-06

## 2019-10-06 PROBLEM — I25.2 H/O NON-ST ELEVATION MYOCARDIAL INFARCTION (NSTEMI): Status: ACTIVE | Noted: 2018-04-30

## 2019-10-06 PROBLEM — I25.10 CAD (CORONARY ARTERY DISEASE), NATIVE CORONARY ARTERY: Status: ACTIVE | Noted: 2018-04-30

## 2020-04-13 DIAGNOSIS — I25.10 CORONARY ARTERY DISEASE INVOLVING NATIVE CORONARY ARTERY OF NATIVE HEART WITHOUT ANGINA PECTORIS: ICD-10-CM

## 2020-04-13 RX ORDER — CLOPIDOGREL BISULFATE 75 MG/1
75 TABLET ORAL DAILY
Qty: 90 TAB | Refills: 3 | Status: SHIPPED | OUTPATIENT
Start: 2020-04-13 | End: 2020-07-17

## 2020-04-15 DIAGNOSIS — I25.10 CORONARY ARTERY DISEASE INVOLVING NATIVE CORONARY ARTERY OF NATIVE HEART WITHOUT ANGINA PECTORIS: ICD-10-CM

## 2020-04-15 RX ORDER — ROSUVASTATIN CALCIUM 40 MG/1
40 TABLET, COATED ORAL
Qty: 90 TAB | Refills: 1 | Status: SHIPPED | OUTPATIENT
Start: 2020-04-15 | End: 2021-01-12

## 2020-04-15 NOTE — TELEPHONE ENCOUNTER
Request for Crestor 40mg QHS. Last office visit 10-4-19, next office visit needs to be scheduled, left message with pharmacy.  Refills per verbal order from Dr. Brandon Green.

## 2020-07-17 ENCOUNTER — OFFICE VISIT (OUTPATIENT)
Dept: CARDIOLOGY CLINIC | Age: 70
End: 2020-07-17

## 2020-07-17 VITALS
HEART RATE: 68 BPM | BODY MASS INDEX: 27.99 KG/M2 | RESPIRATION RATE: 16 BRPM | DIASTOLIC BLOOD PRESSURE: 62 MMHG | SYSTOLIC BLOOD PRESSURE: 130 MMHG | HEIGHT: 69 IN | OXYGEN SATURATION: 99 % | WEIGHT: 189 LBS

## 2020-07-17 DIAGNOSIS — E78.00 PURE HYPERCHOLESTEROLEMIA: ICD-10-CM

## 2020-07-17 DIAGNOSIS — I25.10 CORONARY ARTERY DISEASE INVOLVING NATIVE CORONARY ARTERY OF NATIVE HEART WITHOUT ANGINA PECTORIS: Primary | ICD-10-CM

## 2020-07-17 DIAGNOSIS — I25.2 HX OF MYOCARDIAL INFARCTION: ICD-10-CM

## 2020-07-17 DIAGNOSIS — I10 ESSENTIAL HYPERTENSION: ICD-10-CM

## 2020-07-17 NOTE — PROGRESS NOTES
Bridgette Ann     [id number]       Kamille Gunter MD, [facility] [place]  Date of Visit-7/17/2020   PCP is Stephany Veronica MD   [facility] [place]  [facility]  HPI:  Bridgette Ann is a 78 y.o. male   F/u of CAD with normal KENYA in 10/2019. hospitalized April 30th, 2018  with NSTEMI with diffuse disease.    3-4 episodes stuttering substernal chest pain  First troponin abnormal at 0.36 with shortness of breath some with exertion   ILBBB  4-30-18 [facility] Findings: left main ok. Lad 50% mid after d2, second tubular lesion after d2. lcx tubular ostial 40%, 50% discrete mid lcx after om2, followed by discrete 99%. 60% tubular distal lcx between om2 and 3. rca is large, scattered irregs with 50% mid pda. 2.25 by 12mm rohini to 9atm, at 99% mid lcx  Echo 4-30-18 = EF 55-60%,  Ca++ on AV  He had a ROHINI to the circumflex,  Has hx of HTN, and previous heavy use of alcohol. 10/04/19   ECHO STRESS 10/08/2019 10/15/2019    Narrative · Normal stress echocardiogram. Low risk study. · Baseline ECG: Normal sinus rhythm. · Low risk Duke treadmill score. within normal limits Discussed with patient at office visit        Signed by: Elisha Francois MD     He called about grapefruit juice in April interfering with meds, but this is not an issue. Overall the pt states he is doing well. He endorses abnormal ecchymosis. He was taking turmeric supplements regularly, and stopped taking it one month ago. He exercises 3 x per week. He cycles and lifts at the gym. His most recent systolic BP's at home have been 116, 127, and 129. He also been experiencing a \"pinpoint\" left chest pain that he believes is muscular. The pain lingers for a little while after lifting. Denies chest pain, edema, syncope or shortness of breath at rest, has no tachycardia, palpitations or sense of arrhythmia. Has labwork now with him and this is reviewed and to be scanned    Assessment/Plan:     1.  Coronary artery disease involving native coronary artery of native heart without angina pectoris  Pain free on medical therapy. Has been doing well. We will stop Plavix. Stress echo was reassuring for wall motion last year. Recent labs are excellent. 2. Essential hypertension  At goal , meds and possible side effects reviewed and patient denies  Key CAD CHF Meds             rosuvastatin (CRESTOR) 40 mg tablet (Taking) Take 1 Tab by mouth nightly. nitroglycerin (NITROSTAT) 0.4 mg SL tablet (Taking) 1 Tab by SubLINGual route every five (5) minutes as needed for Chest Pain. Do not exceed 3 doses in 24 hours. losartan (COZAAR) 50 mg tablet (Taking) Take 1 Tab by mouth daily. aspirin delayed-release 81 mg tablet (Taking) Take 81 mg by mouth daily. BP Readings from Last 6 Encounters:   07/17/20 130/62   10/04/19 140/80   10/04/19 160/72   06/13/19 127/65   05/18/19 (!) 152/91   02/25/19 150/68      Normal CBC and BMP on PCP labs    3. Pure hypercholesterolemia  LDL with PCP is 38 now, down from 69 on high potency statin. 4. Hx of myocardial infarction  Future Appointments   Date Time Provider Scotty Henderson   7/19/2021 10:20 AM MD SANDEE Barry BS [facility]     Patient Instructions   You will need to follow up in clinic with Dr. Kym Appiah in 1 year. Please stop taking Plavix      F/u in 1 year. Impression:   1. Coronary artery disease involving native coronary artery of native heart without angina pectoris    2. Essential hypertension    3. Pure hypercholesterolemia    4. Hx of myocardial infarction       Cardiac History:   hospitalized April 30th, 2018  with NSTEMI with diffuse disease. 3-4 episodes stuttering substernal chest pain  First troponin abnormal at 0.36 with shortness of breath some with exertion   ILBBB  4-30-18 [facility] Findings: left main ok. Lad 50% mid after d2, second tubular lesion after d2. lcx tubular ostial 40%, 50% discrete mid lcx after om2, followed by discrete 99%.  60% tubular distal lcx between om2 and 3. rca is large, scattered irregs with 50% mid pda. 2.25 by 12mm rohini to 9atm, at 99% mid lcx  Echo 4-30-18 = EF 55-60%,  Ca++ on AV  He had a ROHINI to the circumflex,  Has hx of HTN, and previous heavy use of alcohol. ROS-except as noted above. . A complete cardiac and respiratory are reviewed and negative except as above ; Resp-denies wheezing  or productive cough,. Const- No unusual weight loss or fever; Neuro-no recent seizure or CVA ; GI- No BRBPR, abdom pain, bloating ; - no  hematuria   see supplement sheet, initialed and to be scanned by staff  Past Medical History:   Diagnosis Date    CAD (coronary artery disease), native coronary artery 04/30/2018    Normal KENYA 10-4-19- prior ROHINI to Circ, diffuse dx in multiple other vessels up to 50%    H/O non-ST elevation myocardial infarction (NSTEMI) 04/30/2018    NSTEMI, PCI ROHINI To Circ 4-30-18- Sofia follows    Hyperlipidemia     Hypertension     Hyperthyroidism     hyperthyroidism      Social Hx= reports that he has never smoked. He has never used smokeless tobacco. He reports current alcohol use of about 10.0 standard drinks of alcohol per week. He reports that he does not use drugs. Exam and Labs:  /62 (BP 1 Location: Left arm, BP Patient Position: Sitting)   Pulse 68   Resp 16   Ht 5' 9\" (1.753 m)   Wt 189 lb (85.7 kg)   SpO2 99%   BMI 27.91 kg/m² Constitutional:  NAD, comfortable  Head: NC,AT. Eyes: No scleral icterus. Neck: No carotid bruits. Neck supple. No JVD present. Throat: moist mucous membranes. Chest: Effort normal & normal respiratory excursion . Neurological: alert, conversant and oriented . Skin: Skin is not cold. No obvious systemic rash noted. Not diaphoretic. No erythema. Psychiatric:  Grossly normal mood and affect. Behavior appears normal. Extremities:  no clubbing or cyanosis. Abdomen: soft, non distended    Lungs:breath sounds normal. No stridor.  distress, wheezes or Rales.  Heart: normal rate, regular rhythm, normal S1, S2, no murmurs, rubs, clicks or gallops , PMI non displaced. Edema: Edema is none. Lab Results   Component Value Date/Time    Cholesterol, total 169 05/01/2018 03:25 AM    HDL Cholesterol 82 05/01/2018 03:25 AM    LDL, calculated 69 05/01/2018 03:25 AM    Triglyceride 90 05/01/2018 03:25 AM    CHOL/HDL Ratio 2.1 05/01/2018 03:25 AM     Lab Results   Component Value Date/Time    Sodium 140 06/13/2019 12:17 PM    Potassium 4.1 06/13/2019 12:17 PM    Chloride 102 06/13/2019 12:17 PM    CO2 23 06/13/2019 12:17 PM    Anion gap 15 06/13/2019 12:17 PM    Glucose 109 (H) 06/13/2019 12:17 PM    BUN 17 06/13/2019 12:17 PM    Creatinine 1.05 06/13/2019 12:17 PM    BUN/Creatinine ratio 16 06/13/2019 12:17 PM    GFR est AA >60 06/13/2019 12:17 PM    GFR est non-AA >60 06/13/2019 12:17 PM    Calcium 8.6 06/13/2019 12:17 PM      Wt Readings from Last 3 Encounters:   07/17/20 189 lb (85.7 kg)   10/04/19 186 lb 9.6 oz (84.6 kg)   10/04/19 187 lb (84.8 kg)      BP Readings from Last 3 Encounters:   07/17/20 130/62   10/04/19 140/80   10/04/19 160/72      Current Outpatient Medications   Medication Sig    rosuvastatin (CRESTOR) 40 mg tablet Take 1 Tab by mouth nightly.  clopidogreL (PLAVIX) 75 mg tab Take 1 Tab by mouth daily.  nitroglycerin (NITROSTAT) 0.4 mg SL tablet 1 Tab by SubLINGual route every five (5) minutes as needed for Chest Pain. Do not exceed 3 doses in 24 hours.  valACYclovir (VALTREX) 1 gram tablet Take  by mouth two (2) times daily as needed.  acetaminophen (TYLENOL) 500 mg tablet Take 2 Tabs by mouth every six (6) hours as needed for Pain.  losartan (COZAAR) 50 mg tablet Take 1 Tab by mouth daily. (Patient taking differently: Take 50 mg by mouth daily. Thinks he is taking 100 mg daily.)    multivitamin (ONE A DAY) tablet Take 1 Tab by mouth daily.  aspirin delayed-release 81 mg tablet Take 81 mg by mouth daily.     levothyroxine (SYNTHROID) 100 mcg tablet Take 100 mcg by mouth Daily (before breakfast).  diclofenac (VOLTAREN) 1 % gel Apply 2-4 g to affected area four (4) times daily as needed (joint pain).  zolpidem (AMBIEN) 10 mg tablet Take 10 mg by mouth nightly as needed for Sleep. No current facility-administered medications for this visit. Impression see above.       Written by Dane Hughes, as dictated by Deana Fine MD.

## 2020-07-17 NOTE — Clinical Note
7/28/20 Patient: Kirk Sanches YOB: 1950 Date of Visit: 7/17/2020 Neo Arredondo MD 
VIA Dear Neo Arredondo MD, Thank you for referring Mr. Yokasta Graves to [address] [place] [address] for evaluation. My notes for this consultation are attached. If you have questions, please do not hesitate to call me. I look forward to following your patient along with you.  
 
 
Sincerely, 
 
Jeremiah Padilla MD

## 2020-07-17 NOTE — PATIENT INSTRUCTIONS
You will need to follow up in clinic with Dr. Lambert Hendrix in 1 year.     Please stop taking Plavix

## 2020-07-17 NOTE — PROGRESS NOTES
Visit Vitals  /62 (BP 1 Location: Left arm, BP Patient Position: Sitting)   Pulse 68   Resp 16   Ht 5' 9\" (1.753 m)   Wt 189 lb (85.7 kg)   SpO2 99%   BMI 27.91 kg/m²

## 2020-10-30 ENCOUNTER — HOSPITAL ENCOUNTER (INPATIENT)
Age: 70
LOS: 2 days | Discharge: HOME | DRG: 282 | End: 2020-11-02
Attending: EMERGENCY MEDICINE | Admitting: FAMILY MEDICINE
Payer: MEDICARE

## 2020-10-30 ENCOUNTER — APPOINTMENT (OUTPATIENT)
Dept: GENERAL RADIOLOGY | Age: 70
DRG: 282 | End: 2020-10-30
Attending: EMERGENCY MEDICINE
Payer: MEDICARE

## 2020-10-30 DIAGNOSIS — I25.2 H/O NON-ST ELEVATION MYOCARDIAL INFARCTION (NSTEMI): ICD-10-CM

## 2020-10-30 DIAGNOSIS — I25.118 CORONARY ARTERY DISEASE OF NATIVE ARTERY OF NATIVE HEART WITH STABLE ANGINA PECTORIS (HCC): ICD-10-CM

## 2020-10-30 DIAGNOSIS — I10 ESSENTIAL HYPERTENSION: ICD-10-CM

## 2020-10-30 DIAGNOSIS — R07.9 ACUTE CHEST PAIN: Primary | ICD-10-CM

## 2020-10-30 DIAGNOSIS — I24.9 ACS (ACUTE CORONARY SYNDROME) (HCC): ICD-10-CM

## 2020-10-30 DIAGNOSIS — R07.9 CHEST PAIN, UNSPECIFIED TYPE: ICD-10-CM

## 2020-10-30 DIAGNOSIS — E78.00 PURE HYPERCHOLESTEROLEMIA: ICD-10-CM

## 2020-10-30 LAB
ALBUMIN SERPL-MCNC: 3.8 G/DL (ref 3.5–5)
ALBUMIN/GLOB SERPL: 1 {RATIO} (ref 1.1–2.2)
ALP SERPL-CCNC: 70 U/L (ref 45–117)
ALT SERPL-CCNC: 26 U/L (ref 12–78)
ANION GAP SERPL CALC-SCNC: 10 MMOL/L (ref 5–15)
APTT PPP: 27.9 SEC (ref 22.1–32)
AST SERPL-CCNC: 29 U/L (ref 15–37)
ATRIAL RATE: 51 BPM
ATRIAL RATE: 73 BPM
BASOPHILS # BLD: 0 K/UL (ref 0–0.1)
BASOPHILS # BLD: 0.1 K/UL (ref 0–0.1)
BASOPHILS NFR BLD: 1 % (ref 0–1)
BASOPHILS NFR BLD: 1 % (ref 0–1)
BILIRUB SERPL-MCNC: 0.4 MG/DL (ref 0.2–1)
BUN SERPL-MCNC: 14 MG/DL (ref 6–20)
BUN/CREAT SERPL: 13 (ref 12–20)
CALCIUM SERPL-MCNC: 9.5 MG/DL (ref 8.5–10.1)
CALCULATED P AXIS, ECG09: 53 DEGREES
CALCULATED P AXIS, ECG09: 64 DEGREES
CALCULATED R AXIS, ECG10: 18 DEGREES
CALCULATED R AXIS, ECG10: 26 DEGREES
CALCULATED T AXIS, ECG11: 45 DEGREES
CALCULATED T AXIS, ECG11: 48 DEGREES
CHLORIDE SERPL-SCNC: 103 MMOL/L (ref 97–108)
CO2 SERPL-SCNC: 26 MMOL/L (ref 21–32)
COMMENT, HOLDF: NORMAL
CREAT SERPL-MCNC: 1.12 MG/DL (ref 0.7–1.3)
DIAGNOSIS, 93000: NORMAL
DIAGNOSIS, 93000: NORMAL
DIFFERENTIAL METHOD BLD: ABNORMAL
DIFFERENTIAL METHOD BLD: NORMAL
EOSINOPHIL # BLD: 0.1 K/UL (ref 0–0.4)
EOSINOPHIL # BLD: 0.2 K/UL (ref 0–0.4)
EOSINOPHIL NFR BLD: 2 % (ref 0–7)
EOSINOPHIL NFR BLD: 3 % (ref 0–7)
ERYTHROCYTE [DISTWIDTH] IN BLOOD BY AUTOMATED COUNT: 12.7 % (ref 11.5–14.5)
ERYTHROCYTE [DISTWIDTH] IN BLOOD BY AUTOMATED COUNT: 12.7 % (ref 11.5–14.5)
GLOBULIN SER CALC-MCNC: 3.7 G/DL (ref 2–4)
GLUCOSE SERPL-MCNC: 106 MG/DL (ref 65–100)
HCT VFR BLD AUTO: 39 % (ref 36.6–50.3)
HCT VFR BLD AUTO: 40.5 % (ref 36.6–50.3)
HGB BLD-MCNC: 12.6 G/DL (ref 12.1–17)
HGB BLD-MCNC: 13.1 G/DL (ref 12.1–17)
IMM GRANULOCYTES # BLD AUTO: 0 K/UL (ref 0–0.04)
IMM GRANULOCYTES # BLD AUTO: 0 K/UL (ref 0–0.04)
IMM GRANULOCYTES NFR BLD AUTO: 0 % (ref 0–0.5)
IMM GRANULOCYTES NFR BLD AUTO: 0 % (ref 0–0.5)
LYMPHOCYTES # BLD: 1.5 K/UL (ref 0.8–3.5)
LYMPHOCYTES # BLD: 1.7 K/UL (ref 0.8–3.5)
LYMPHOCYTES NFR BLD: 20 % (ref 12–49)
LYMPHOCYTES NFR BLD: 29 % (ref 12–49)
MCH RBC QN AUTO: 31.3 PG (ref 26–34)
MCH RBC QN AUTO: 31.7 PG (ref 26–34)
MCHC RBC AUTO-ENTMCNC: 32.3 G/DL (ref 30–36.5)
MCHC RBC AUTO-ENTMCNC: 32.3 G/DL (ref 30–36.5)
MCV RBC AUTO: 97 FL (ref 80–99)
MCV RBC AUTO: 98.1 FL (ref 80–99)
MONOCYTES # BLD: 0.6 K/UL (ref 0–1)
MONOCYTES # BLD: 0.7 K/UL (ref 0–1)
MONOCYTES NFR BLD: 11 % (ref 5–13)
MONOCYTES NFR BLD: 9 % (ref 5–13)
NEUTS SEG # BLD: 3.3 K/UL (ref 1.8–8)
NEUTS SEG # BLD: 5.4 K/UL (ref 1.8–8)
NEUTS SEG NFR BLD: 56 % (ref 32–75)
NEUTS SEG NFR BLD: 69 % (ref 32–75)
NRBC # BLD: 0 K/UL (ref 0–0.01)
NRBC # BLD: 0 K/UL (ref 0–0.01)
NRBC BLD-RTO: 0 PER 100 WBC
NRBC BLD-RTO: 0 PER 100 WBC
P-R INTERVAL, ECG05: 172 MS
P-R INTERVAL, ECG05: 184 MS
PLATELET # BLD AUTO: 175 K/UL (ref 150–400)
PLATELET # BLD AUTO: 178 K/UL (ref 150–400)
PMV BLD AUTO: 10.2 FL (ref 8.9–12.9)
PMV BLD AUTO: 10.4 FL (ref 8.9–12.9)
POTASSIUM SERPL-SCNC: 4.1 MMOL/L (ref 3.5–5.1)
PROT SERPL-MCNC: 7.5 G/DL (ref 6.4–8.2)
Q-T INTERVAL, ECG07: 396 MS
Q-T INTERVAL, ECG07: 444 MS
QRS DURATION, ECG06: 106 MS
QRS DURATION, ECG06: 94 MS
QTC CALCULATION (BEZET), ECG08: 409 MS
QTC CALCULATION (BEZET), ECG08: 436 MS
RBC # BLD AUTO: 4.02 M/UL (ref 4.1–5.7)
RBC # BLD AUTO: 4.13 M/UL (ref 4.1–5.7)
SAMPLES BEING HELD,HOLD: NORMAL
SODIUM SERPL-SCNC: 139 MMOL/L (ref 136–145)
THERAPEUTIC RANGE,PTTT: NORMAL SECS (ref 58–77)
TROPONIN I SERPL-MCNC: 0.57 NG/ML
TROPONIN I SERPL-MCNC: <0.05 NG/ML
TSH SERPL DL<=0.05 MIU/L-ACNC: 0.43 UIU/ML (ref 0.36–3.74)
VENTRICULAR RATE, ECG03: 51 BPM
VENTRICULAR RATE, ECG03: 73 BPM
WBC # BLD AUTO: 5.9 K/UL (ref 4.1–11.1)
WBC # BLD AUTO: 7.8 K/UL (ref 4.1–11.1)

## 2020-10-30 PROCEDURE — 93005 ELECTROCARDIOGRAM TRACING: CPT

## 2020-10-30 PROCEDURE — 99284 EMERGENCY DEPT VISIT MOD MDM: CPT

## 2020-10-30 PROCEDURE — 74011250637 HC RX REV CODE- 250/637: Performed by: NURSE PRACTITIONER

## 2020-10-30 PROCEDURE — 84484 ASSAY OF TROPONIN QUANT: CPT

## 2020-10-30 PROCEDURE — 85730 THROMBOPLASTIN TIME PARTIAL: CPT

## 2020-10-30 PROCEDURE — 85025 COMPLETE CBC W/AUTO DIFF WBC: CPT

## 2020-10-30 PROCEDURE — 71045 X-RAY EXAM CHEST 1 VIEW: CPT

## 2020-10-30 PROCEDURE — 99218 HC RM OBSERVATION: CPT

## 2020-10-30 PROCEDURE — 74011250637 HC RX REV CODE- 250/637: Performed by: FAMILY MEDICINE

## 2020-10-30 PROCEDURE — 36415 COLL VENOUS BLD VENIPUNCTURE: CPT

## 2020-10-30 PROCEDURE — 74011250637 HC RX REV CODE- 250/637: Performed by: EMERGENCY MEDICINE

## 2020-10-30 PROCEDURE — 84443 ASSAY THYROID STIM HORMONE: CPT

## 2020-10-30 PROCEDURE — 96365 THER/PROPH/DIAG IV INF INIT: CPT

## 2020-10-30 PROCEDURE — 74011250636 HC RX REV CODE- 250/636: Performed by: FAMILY MEDICINE

## 2020-10-30 PROCEDURE — 80053 COMPREHEN METABOLIC PANEL: CPT

## 2020-10-30 PROCEDURE — 96372 THER/PROPH/DIAG INJ SC/IM: CPT

## 2020-10-30 PROCEDURE — 74011250636 HC RX REV CODE- 250/636: Performed by: NURSE PRACTITIONER

## 2020-10-30 RX ORDER — HEPARIN SODIUM 5000 [USP'U]/ML
5000 INJECTION, SOLUTION INTRAVENOUS; SUBCUTANEOUS EVERY 8 HOURS
Status: DISCONTINUED | OUTPATIENT
Start: 2020-10-30 | End: 2020-10-31 | Stop reason: SDUPTHER

## 2020-10-30 RX ORDER — ROSUVASTATIN CALCIUM 40 MG/1
40 TABLET, COATED ORAL
Status: DISCONTINUED | OUTPATIENT
Start: 2020-10-30 | End: 2020-11-02 | Stop reason: HOSPADM

## 2020-10-30 RX ORDER — HEPARIN SODIUM 10000 [USP'U]/100ML
11-25 INJECTION, SOLUTION INTRAVENOUS
Status: DISCONTINUED | OUTPATIENT
Start: 2020-10-30 | End: 2020-11-02

## 2020-10-30 RX ORDER — SODIUM CHLORIDE 0.9 % (FLUSH) 0.9 %
5-40 SYRINGE (ML) INJECTION AS NEEDED
Status: DISCONTINUED | OUTPATIENT
Start: 2020-10-30 | End: 2020-11-02 | Stop reason: HOSPADM

## 2020-10-30 RX ORDER — GUAIFENESIN 100 MG/5ML
243 LIQUID (ML) ORAL
Status: COMPLETED | OUTPATIENT
Start: 2020-10-30 | End: 2020-10-30

## 2020-10-30 RX ORDER — SODIUM CHLORIDE 0.9 % (FLUSH) 0.9 %
5-40 SYRINGE (ML) INJECTION EVERY 8 HOURS
Status: DISCONTINUED | OUTPATIENT
Start: 2020-10-30 | End: 2020-11-02 | Stop reason: HOSPADM

## 2020-10-30 RX ORDER — ACETAMINOPHEN 325 MG/1
650 TABLET ORAL
Status: DISCONTINUED | OUTPATIENT
Start: 2020-10-30 | End: 2020-11-02 | Stop reason: HOSPADM

## 2020-10-30 RX ORDER — NITROGLYCERIN 0.4 MG/1
0.4 TABLET SUBLINGUAL
Status: DISCONTINUED | OUTPATIENT
Start: 2020-10-30 | End: 2020-11-02 | Stop reason: HOSPADM

## 2020-10-30 RX ORDER — ASPIRIN 81 MG/1
81 TABLET ORAL DAILY
Status: DISCONTINUED | OUTPATIENT
Start: 2020-10-31 | End: 2020-10-30 | Stop reason: SDUPTHER

## 2020-10-30 RX ORDER — ZOLPIDEM TARTRATE 5 MG/1
10 TABLET ORAL
Status: DISCONTINUED | OUTPATIENT
Start: 2020-10-30 | End: 2020-11-02 | Stop reason: HOSPADM

## 2020-10-30 RX ORDER — SODIUM CHLORIDE 9 MG/ML
75 INJECTION, SOLUTION INTRAVENOUS CONTINUOUS
Status: DISCONTINUED | OUTPATIENT
Start: 2020-10-30 | End: 2020-11-02

## 2020-10-30 RX ORDER — LOSARTAN POTASSIUM 50 MG/1
100 TABLET ORAL DAILY
Status: DISCONTINUED | OUTPATIENT
Start: 2020-10-31 | End: 2020-11-02 | Stop reason: HOSPADM

## 2020-10-30 RX ORDER — LEVOTHYROXINE SODIUM 100 UG/1
100 TABLET ORAL
Status: DISCONTINUED | OUTPATIENT
Start: 2020-10-31 | End: 2020-11-02 | Stop reason: HOSPADM

## 2020-10-30 RX ORDER — GUAIFENESIN 100 MG/5ML
81 LIQUID (ML) ORAL DAILY
Status: DISCONTINUED | OUTPATIENT
Start: 2020-10-31 | End: 2020-11-02 | Stop reason: HOSPADM

## 2020-10-30 RX ADMIN — HEPARIN SODIUM AND DEXTROSE 11 UNITS/KG/HR: 10000; 5 INJECTION INTRAVENOUS at 23:44

## 2020-10-30 RX ADMIN — NITROGLYCERIN 1 INCH: 20 OINTMENT TOPICAL at 12:09

## 2020-10-30 RX ADMIN — Medication 10 ML: at 21:00

## 2020-10-30 RX ADMIN — ASPIRIN 243 MG: 81 TABLET, CHEWABLE ORAL at 12:09

## 2020-10-30 RX ADMIN — ROSUVASTATIN 40 MG: 40 TABLET, FILM COATED ORAL at 22:55

## 2020-10-30 RX ADMIN — HEPARIN SODIUM 5000 UNITS: 5000 INJECTION INTRAVENOUS; SUBCUTANEOUS at 20:51

## 2020-10-30 RX ADMIN — ZOLPIDEM TARTRATE 5 MG: 5 TABLET ORAL at 22:53

## 2020-10-30 RX ADMIN — SODIUM CHLORIDE 75 ML/HR: 900 INJECTION, SOLUTION INTRAVENOUS at 19:06

## 2020-10-30 NOTE — ROUTINE PROCESS
TRANSFER - OUT REPORT: 
 
Verbal report given to [facility] RN(name) on Shell Munson  being transferred to [facility](unit) for routine progression of care Report consisted of patients Situation, Background, Assessment and  
Recommendations(SBAR). Information from the following report(s) SBAR and ED Summary was reviewed with the receiving nurse. Lines:  
Peripheral IV 10/30/20 Left (Active) Site Assessment Clean, dry, & intact 10/30/20 1144 Phlebitis Assessment 0 10/30/20 1144 Infiltration Assessment 0 10/30/20 1144 Dressing Status Clean, dry, & intact 10/30/20 1144 Dressing Type Transparent 10/30/20 1144 Opportunity for questions and clarification was provided.

## 2020-10-30 NOTE — PROGRESS NOTES
TRANSFER - IN REPORT:    Verbal report received from Lilian(name) on Randye Lipoma  being received from [facility](unit) for routine progression of care      Report consisted of patients Situation, Background, Assessment and   Recommendations(SBAR). Information from the following report(s) SBAR, Kardex, Procedure Summary, MAR and Recent Results was reviewed with the receiving nurse. Opportunity for questions and clarification was provided. Assessment completed upon patients arrival to unit and care assumed.

## 2020-10-30 NOTE — H&P
History & Physical    Primary Care Provider: Colby Win MD  Source of Information: Patient     History of Presenting Illness:   Alice Maloney is a 69 y.o. male who presents with chest pain    History was primarily obtained from the patient    Patient reports that today around 10:00 he had sudden onset of mid chest pain, pain was rated in the mid part of the chest, it was quite severe with no radiation. Patient reports that he had some left-sided chest pain 2 to 3 days ago which resolved on its own. Patient reports the pain was quite discomforting today and he got concerned and decided to come to the hospital.  Patient reports that he has had a history of MI and has a stent in his coronary arteries, but does not remember if the pain was as severe as it was this time when he had the last MI. Patient reports he did take his aspirin today and 2 nitroglycerin tablets with the event and his pain got somewhat better after nitroglycerin. Patient came to [facility] ER and was transferred to [facility] for further management and evaluation. The patient denies any Headache, blurry vision, sore throat, trouble swallowing, trouble with speech, sob,, cough, fever, chills, N/V/D, abd pain, urinary symptoms, constipation, recent travels, sick contacts, focal or generalized neurological symptoms,  falls, injuries, rashes, contact with COVID-19 diagnosed patients, hematemesis, melena, hemoptysis, hematuria, rashes, denies starting any new medications and denies any other concerns or problems besides as mentioned above. Review of Systems:  A comprehensive review of systems was negative except for that written in the History of Present Illness.      Past Medical History:   Diagnosis Date    CAD (coronary artery disease), native coronary artery 04/30/2018    Normal KENYA 10-4-19- prior KELLY to Circ, diffuse dx in multiple other vessels up to 50%    H/O non-ST elevation myocardial infarction (NSTEMI) 04/30/2018    NSTEMI, PCI KELLY To [facility] 4-30-18- Sofia follows    Hyperlipidemia     Hypertension     Hyperthyroidism     hyperthyroidism      Past Surgical History:   Procedure Laterality Date    CARDIAC SURG PROCEDURE UNLIST      Stent -  Maker    HX ORTHOPAEDIC  09/2013    bilateral knee replacement    HX ORTHOPAEDIC      Right achilles    HX SHOULDER ARTHROSCOPY  2011    Bilateral Shoulders     Prior to Admission medications    Medication Sig Start Date End Date Taking? Authorizing Provider   rosuvastatin (CRESTOR) 40 mg tablet Take 1 Tab by mouth nightly. 4/15/20  Yes Monet Bartlett MD   nitroglycerin (NITROSTAT) 0.4 mg SL tablet 1 Tab by SubLINGual route every five (5) minutes as needed for Chest Pain. Do not exceed 3 doses in 24 hours. 10/4/19  Yes Monet Bartlett MD   valACYclovir (VALTREX) 1 gram tablet Take  by mouth two (2) times daily as needed. Yes Other, MD Elle   acetaminophen (TYLENOL) 500 mg tablet Take 2 Tabs by mouth every six (6) hours as needed for Pain. 5/18/19  Yes Valentino Magallon MD   losartan (COZAAR) 50 mg tablet Take 1 Tab by mouth daily. Patient taking differently: Take 50 mg by mouth daily. Thinks he is taking 100 mg daily. 8/23/18  Yes Moent Bartlett MD   multivitamin (ONE A DAY) tablet Take 1 Tab by mouth daily. Yes Provider, Historical   aspirin delayed-release 81 mg tablet Take 81 mg by mouth daily. Yes Provider, Historical   levothyroxine (SYNTHROID) 100 mcg tablet Take 100 mcg by mouth Daily (before breakfast). Yes Provider, Historical   diclofenac (VOLTAREN) 1 % gel Apply 2-4 g to affected area four (4) times daily as needed (joint pain). Yes Provider, Historical   zolpidem (AMBIEN) 10 mg tablet Take 10 mg by mouth nightly as needed for Sleep. Yes Provider, Historical     No Known Allergies   History reviewed. No pertinent family history.      SOCIAL HISTORY:  Patient resides:  Independently x   Assisted Living SNF    With family care       Smoking history:   None x   Former    Chronic      Alcohol history:   None    Social x   Chronic      Ambulates:   Independently x   w/cane    w/walker    w/wc    CODE STATUS:  DNR    Full x   Other      Objective:     Physical Exam:     Visit Vitals  /81 (BP 1 Location: Left arm, BP Patient Position: At rest)   Pulse (!) 53   Temp 98.1 °F (36.7 °C)   Resp 18   Ht 5' 9\" (1.753 m)   Wt 88.1 kg (194 lb 3.6 oz)   SpO2 99%   BMI 28.68 kg/m²      O2 Device: Room air    General : alert x 3, awake, no acute distress, resting in bed, pleasant male, appears to be stated age  HEENT: PEERL, EOMI, moist mucus membrane, TM clear  Neck: supple, no JVD, no meningeal signs  Chest: Clear to auscultation bilaterally   CVS: S1 S2 heard, Capillary refill less than 2 seconds  Abd: soft/ Non tender, non distended, BS physiological,   Ext: no clubbing, no cyanosis, no edema, brisk 2+ DP pulses  Neuro/Psych: pleasant mood and affect, CN 2-12 grossly intact, sensory grossly within normal limit, Strength 5/5 in all extremities, DTR 1+ x 4  Skin: warm      EKG: Sinus bradycardia with nonspecific ST changes. Data Review:     Recent Days:  Recent Labs     10/30/20  1145   WBC 7.8   HGB 13.1   HCT 40.5        Recent Labs     10/30/20  1145      K 4.1      CO2 26   *   BUN 14   CREA 1.12   CA 9.5   ALB 3.8   ALT 26     No results for input(s): PH, PCO2, PO2, HCO3, FIO2 in the last 72 hours.     24 Hour Results:  Recent Results (from the past 24 hour(s))   EKG, 12 LEAD, INITIAL    Collection Time: 10/30/20 11:36 AM   Result Value Ref Range    Ventricular Rate 73 BPM    Atrial Rate 73 BPM    P-R Interval 172 ms    QRS Duration 106 ms    Q-T Interval 396 ms    QTC Calculation (Bezet) 436 ms    Calculated P Axis 53 degrees    Calculated R Axis 18 degrees    Calculated T Axis 45 degrees    Diagnosis       Normal sinus rhythm  Left atrial enlargement  Incomplete left bundle branch block  When compared with ECG of 13-JUN-2019 12:12,  No significant change was found  Confirmed by Will Koehler M.D., Jc Sal ([id number]) on 10/30/2020 5:29:59 PM     CBC WITH AUTOMATED DIFF    Collection Time: 10/30/20 11:45 AM   Result Value Ref Range    WBC 7.8 4.1 - 11.1 K/uL    RBC 4.13 4.10 - 5.70 M/uL    HGB 13.1 12.1 - 17.0 g/dL    HCT 40.5 36.6 - 50.3 %    MCV 98.1 80.0 - 99.0 FL    MCH 31.7 26.0 - 34.0 PG    MCHC 32.3 30.0 - 36.5 g/dL    RDW 12.7 11.5 - 14.5 %    PLATELET 122 362 - 581 K/uL    MPV 10.4 8.9 - 12.9 FL    NRBC 0.0 0  WBC    ABSOLUTE NRBC 0.00 0.00 - 0.01 K/uL    NEUTROPHILS 69 32 - 75 %    LYMPHOCYTES 20 12 - 49 %    MONOCYTES 9 5 - 13 %    EOSINOPHILS 2 0 - 7 %    BASOPHILS 1 0 - 1 %    IMMATURE GRANULOCYTES 0 0.0 - 0.5 %    ABS. NEUTROPHILS 5.4 1.8 - 8.0 K/UL    ABS. LYMPHOCYTES 1.5 0.8 - 3.5 K/UL    ABS. MONOCYTES 0.7 0.0 - 1.0 K/UL    ABS. EOSINOPHILS 0.1 0.0 - 0.4 K/UL    ABS. BASOPHILS 0.1 0.0 - 0.1 K/UL    ABS. IMM. GRANS. 0.0 0.00 - 0.04 K/UL    DF AUTOMATED     METABOLIC PANEL, COMPREHENSIVE    Collection Time: 10/30/20 11:45 AM   Result Value Ref Range    Sodium 139 136 - 145 mmol/L    Potassium 4.1 3.5 - 5.1 mmol/L    Chloride 103 97 - 108 mmol/L    CO2 26 21 - 32 mmol/L    Anion gap 10 5 - 15 mmol/L    Glucose 106 (H) 65 - 100 mg/dL    BUN 14 6 - 20 MG/DL    Creatinine 1.12 0.70 - 1.30 MG/DL    BUN/Creatinine ratio 13 12 - 20      GFR est AA >60 >60 ml/min/1.73m2    GFR est non-AA >60 >60 ml/min/1.73m2    Calcium 9.5 8.5 - 10.1 MG/DL    Bilirubin, total 0.4 0.2 - 1.0 MG/DL    ALT (SGPT) 26 12 - 78 U/L    AST (SGOT) 29 15 - 37 U/L    Alk.  phosphatase 70 45 - 117 U/L    Protein, total 7.5 6.4 - 8.2 g/dL    Albumin 3.8 3.5 - 5.0 g/dL    Globulin 3.7 2.0 - 4.0 g/dL    A-G Ratio 1.0 (L) 1.1 - 2.2     SAMPLES BEING HELD    Collection Time: 10/30/20 11:45 AM   Result Value Ref Range    SAMPLES BEING HELD 1rd 1bl     COMMENT        Add-on orders for these samples will be processed based on acceptable specimen integrity and analyte stability, which may vary by analyte. TROPONIN I    Collection Time: 10/30/20 11:45 AM   Result Value Ref Range    Troponin-I, Qt. <0.05 <0.05 ng/mL   EKG, 12 LEAD, INITIAL    Collection Time: 10/30/20  2:36 PM   Result Value Ref Range    Ventricular Rate 51 BPM    Atrial Rate 51 BPM    P-R Interval 184 ms    QRS Duration 94 ms    Q-T Interval 444 ms    QTC Calculation (Bezet) 409 ms    Calculated P Axis 64 degrees    Calculated R Axis 26 degrees    Calculated T Axis 48 degrees    Diagnosis       Sinus bradycardia  Otherwise normal ECG  When compared with ECG of 30-OCT-2020 11:36,  No significant change  Confirmed by Atif Hodgson M.D., Ajit Terrys ([id number]) on 10/30/2020 5:36:07 PM           Imaging:   Joyce Aquas Chest Port    Result Date: 10/30/2020  Impression: No acute process.      Assessment:     Chest pain: Concerning for ACS, patient will be observed on a telemetry bed, cycle troponins, continue aspirin, statin, telemetry monitoring, n.p.o. after midnight for possible stress versus cath in the morning, cardiology consult, nitroglycerin, echo, supportive care and close monitoring, further intervention per hospital course, reassess as needed, no beta-blockers patient is somewhat bradycardic    Hypertension: Continue home medications and continue monitor    Hyperlipidemia: Continue home medications and continue monitor    Hypothyroidism: Continue Synthroid, continue monitor    GI DVT prophylaxis: Patient will be on heparin           Signed By: Joanna Robledo MD     October 30, 2020

## 2020-10-30 NOTE — ED TRIAGE NOTES
Around 11am pt was sitting at his desk and got sudden onset of chest pressure with dizziness. Pt took 2 nitrogylcerin tabs that relieved chest pain some.  Pt has hx of MI with one stent

## 2020-10-30 NOTE — Clinical Note
Right groin and right radial prepped with ChloraPrep and draped. Wet prep solution applied at: 945. Wet prep solution dried at: 950. Wet prep elapsed drying time: 5 mins.

## 2020-10-30 NOTE — ED PROVIDER NOTES
Mr. Markus Rios is a 48-year-old male who presents to the ER with complaints of substernal chest pain. He states that his symptoms began approximate 1 hour prior to arrival.  His pain does not radiate. He reports having some left-sided, more lateral pain 2 to 3 days ago which resolved on its own. He said he had a heart attack 2 years ago. He said this feels like his heart attack 2 years ago. He denies having had any chest pain since his heart attack. He says he feels somewhat lightheaded and dizzy. He  denies shortness of breath. He said that he took an 81 mg aspirin today and took 2 nitroglycerin tablets. He said that his pain is somewhat better after nitroglycerin. He denies any other complaints. Past Medical History:   Diagnosis Date    CAD (coronary artery disease), native coronary artery 04/30/2018    Normal KENYA 10-4-19- prior KELLY to Circ, diffuse dx in multiple other vessels up to 50%    H/O non-ST elevation myocardial infarction (NSTEMI) 04/30/2018    NSTEMI, PCI KELLY To Circ 4-30-18- Sofia follows    Hyperlipidemia     Hypertension     Hyperthyroidism     hyperthyroidism       Past Surgical History:   Procedure Laterality Date    CARDIAC SURG PROCEDURE UNLIST      Stent -  Maker    HX ORTHOPAEDIC  09/2013    bilateral knee replacement    HX ORTHOPAEDIC      Right achilles    HX SHOULDER ARTHROSCOPY  2011    Bilateral Shoulders         History reviewed. No pertinent family history.     Social History     Socioeconomic History    Marital status:      Spouse name: Not on file    Number of children: Not on file    Years of education: Not on file    Highest education level: Not on file   Occupational History    Not on file   Social Needs    Financial resource strain: Not on file    Food insecurity     Worry: Not on file     Inability: Not on file    Transportation needs     Medical: Not on file     Non-medical: Not on file   Tobacco Use    Smoking status: Never Smoker  Smokeless tobacco: Never Used   Substance and Sexual Activity    Alcohol use: Yes     Alcohol/week: 10.0 standard drinks     Types: 10 Shots of liquor per week     Comment: 2 shots/day    Drug use: No    Sexual activity: Not on file   Lifestyle    Physical activity     Days per week: Not on file     Minutes per session: Not on file    Stress: Not on file   Relationships    Social connections     Talks on phone: Not on file     Gets together: Not on file     Attends Worship service: Not on file     Active member of club or organization: Not on file     Attends meetings of clubs or organizations: Not on file     Relationship status: Not on file    Intimate partner violence     Fear of current or ex partner: Not on file     Emotionally abused: Not on file     Physically abused: Not on file     Forced sexual activity: Not on file   Other Topics Concern    Not on file   Social History Narrative    Not on file         ALLERGIES: Patient has no known allergies. Review of Systems   Constitutional: Negative for chills and fever. HENT: Negative for rhinorrhea and sore throat. Respiratory: Negative for cough and shortness of breath. Cardiovascular: Positive for chest pain. Gastrointestinal: Negative for abdominal pain, diarrhea, nausea and vomiting. Genitourinary: Negative for dysuria and hematuria. Musculoskeletal: Negative for arthralgias and myalgias. Skin: Negative for pallor and rash. Neurological: Positive for light-headedness. Negative for dizziness and weakness. All other systems reviewed and are negative. Vitals:    10/30/20 1138   BP: (!) 144/71   Pulse: 70   Resp: 13   Temp: 98.1 °F (36.7 °C)   SpO2: 96%   Weight: 88.1 kg (194 lb 3.6 oz)   Height: 5' 9\" (1.753 m)            Physical Exam     Vital signs reviewed. Nursing notes reviewed.     Const:  No acute distress, well developed, well nourished  Head:  Atraumatic, normocephalic  Eyes:  PERRL, conjunctiva normal, no scleral icterus  Neck:  Supple, trachea midline  Cardiovascular: Regular rate and rhythm, no chest tenderness to palpation  Resp:  No resp distress, no increased work of breathing  Abd:  Soft, non-tender, non-distended  MSK:  No pedal edema, normal ROM  Neuro:  Alert and oriented x3, no cranial nerve defect  Skin:  Warm, dry, intact  Psych: normal mood and affect, behavior is normal, judgement and thought content is normal          MDM  Number of Diagnoses or Management Options     Amount and/or Complexity of Data Reviewed  Clinical lab tests: ordered and reviewed  Tests in the radiology section of CPT®: ordered and reviewed  Review and summarize past medical records: yes    Patient Progress  Patient progress: stable         Procedures    12:53 PM  Pt. Still is having some pain, however, it has improved. First troponin is negative. Pt. Says that the only time he has had pain like this is when he had his heart attack. I will admit him to the hospital.             [address] for Admission  12:55 PM    ED Room Number: SER02/02  Patient Name and age:  Sabina Foote 69 y.o.  male  Working Diagnosis:   1. Acute chest pain    2.  ACS (acute coronary syndrome) ([facility] [address]        COVID-19 Suspicion:  no  Sepsis present:  no  Reassessment needed: no  Code Status:  Full Code  Readmission: no  Isolation Requirements:  no  Recommended Level of Care:  telemetry  Department:[place] [facility] - [phone]

## 2020-10-31 ENCOUNTER — APPOINTMENT (OUTPATIENT)
Dept: NON INVASIVE DIAGNOSTICS | Age: 70
DRG: 282 | End: 2020-10-31
Attending: FAMILY MEDICINE
Payer: MEDICARE

## 2020-10-31 LAB
ALBUMIN SERPL-MCNC: 3.2 G/DL (ref 3.5–5)
ALBUMIN/GLOB SERPL: 1.1 {RATIO} (ref 1.1–2.2)
ALP SERPL-CCNC: 59 U/L (ref 45–117)
ALT SERPL-CCNC: 23 U/L (ref 12–78)
ANION GAP SERPL CALC-SCNC: 8 MMOL/L (ref 5–15)
APTT PPP: 47.3 SEC (ref 22.1–32)
APTT PPP: 50.4 SEC (ref 22.1–32)
APTT PPP: 51.6 SEC (ref 22.1–32)
APTT PPP: 59.5 SEC (ref 22.1–32)
AST SERPL-CCNC: 25 U/L (ref 15–37)
BASOPHILS # BLD: 0 K/UL (ref 0–0.1)
BASOPHILS NFR BLD: 1 % (ref 0–1)
BILIRUB SERPL-MCNC: 0.4 MG/DL (ref 0.2–1)
BUN SERPL-MCNC: 25 MG/DL (ref 6–20)
BUN/CREAT SERPL: 26 (ref 12–20)
CALCIUM SERPL-MCNC: 9 MG/DL (ref 8.5–10.1)
CHLORIDE SERPL-SCNC: 108 MMOL/L (ref 97–108)
CHOLEST SERPL-MCNC: 138 MG/DL
CO2 SERPL-SCNC: 24 MMOL/L (ref 21–32)
CREAT SERPL-MCNC: 0.95 MG/DL (ref 0.7–1.3)
DIFFERENTIAL METHOD BLD: ABNORMAL
ECHO AR MAX VEL PISA: 374.3 CM/S
ECHO AV AREA PEAK VELOCITY: 1.77 CM2
ECHO AV AREA/BSA PEAK VELOCITY: 0.9 CM2/M2
ECHO AV PEAK GRADIENT: 6.29 MMHG
ECHO AV PEAK VELOCITY: 125.37 CM/S
ECHO AV REGURGITANT PHT: 643.32 MS
ECHO LA AREA 4C: 18.85 CM2
ECHO LA MAJOR AXIS: 3.5 CM
ECHO LA MINOR AXIS: 1.74 CM
ECHO LA VOL 4C: 47.34 ML (ref 18–58)
ECHO LA VOLUME INDEX A4C: 23.53 ML/M2 (ref 16–28)
ECHO LV EDV A2C: 150.31 ML
ECHO LV EDV A4C: 126.87 ML
ECHO LV EDV BP: 138.71 ML (ref 67–155)
ECHO LV EDV INDEX A4C: 63.1 ML/M2
ECHO LV EDV INDEX BP: 68.9 ML/M2
ECHO LV EDV NDEX A2C: 74.7 ML/M2
ECHO LV EJECTION FRACTION A2C: 53 PERCENT
ECHO LV EJECTION FRACTION A4C: 52 PERCENT
ECHO LV EJECTION FRACTION BIPLANE: 53.1 PERCENT (ref 55–100)
ECHO LV ESV A2C: 70.2 ML
ECHO LV ESV A4C: 61.13 ML
ECHO LV ESV BP: 65.09 ML (ref 22–58)
ECHO LV ESV INDEX A2C: 34.9 ML/M2
ECHO LV ESV INDEX A4C: 30.4 ML/M2
ECHO LV ESV INDEX BP: 32.4 ML/M2
ECHO LV INTERNAL DIMENSION DIASTOLIC: 4.71 CM (ref 4.2–5.9)
ECHO LV INTERNAL DIMENSION SYSTOLIC: 3.29 CM
ECHO LV IVSD: 0.95 CM (ref 0.6–1)
ECHO LV MASS 2D: 148.5 G (ref 88–224)
ECHO LV MASS INDEX 2D: 73.8 G/M2 (ref 49–115)
ECHO LV POSTERIOR WALL DIASTOLIC: 0.9 CM (ref 0.6–1)
ECHO LVOT DIAM: 2.05 CM
ECHO LVOT PEAK GRADIENT: 1.8 MMHG
ECHO LVOT PEAK VELOCITY: 67.1 CM/S
ECHO MV A VELOCITY: 70.05 CM/S
ECHO MV AREA PHT: 3.22 CM2
ECHO MV E DECELERATION TIME (DT): 235.31 MS
ECHO MV E VELOCITY: 70.05 CM/S
ECHO MV E/A RATIO: 1
ECHO MV PRESSURE HALF TIME (PHT): 68.24 MS
ECHO PV PEAK INSTANTANEOUS GRADIENT SYSTOLIC: 3.98 MMHG
ECHO RV INTERNAL DIMENSION: 4.7 CM
ECHO RV TAPSE: 2.46 CM (ref 1.5–2)
ECHO TV REGURGITANT MAX VELOCITY: 261.61 CM/S
ECHO TV REGURGITANT PEAK GRADIENT: 27.38 MMHG
EOSINOPHIL # BLD: 0.2 K/UL (ref 0–0.4)
EOSINOPHIL NFR BLD: 4 % (ref 0–7)
ERYTHROCYTE [DISTWIDTH] IN BLOOD BY AUTOMATED COUNT: 12.7 % (ref 11.5–14.5)
GLOBULIN SER CALC-MCNC: 2.9 G/DL (ref 2–4)
GLUCOSE SERPL-MCNC: 93 MG/DL (ref 65–100)
HCT VFR BLD AUTO: 37.2 % (ref 36.6–50.3)
HDLC SERPL-MCNC: 102 MG/DL
HDLC SERPL: 1.4 {RATIO} (ref 0–5)
HGB BLD-MCNC: 12 G/DL (ref 12.1–17)
IMM GRANULOCYTES # BLD AUTO: 0 K/UL (ref 0–0.04)
IMM GRANULOCYTES NFR BLD AUTO: 0 % (ref 0–0.5)
LDLC SERPL CALC-MCNC: 26.4 MG/DL (ref 0–100)
LIPID PROFILE,FLP: NORMAL
LYMPHOCYTES # BLD: 1.7 K/UL (ref 0.8–3.5)
LYMPHOCYTES NFR BLD: 34 % (ref 12–49)
MCH RBC QN AUTO: 31.7 PG (ref 26–34)
MCHC RBC AUTO-ENTMCNC: 32.3 G/DL (ref 30–36.5)
MCV RBC AUTO: 98.2 FL (ref 80–99)
MONOCYTES # BLD: 0.6 K/UL (ref 0–1)
MONOCYTES NFR BLD: 12 % (ref 5–13)
NEUTS SEG # BLD: 2.5 K/UL (ref 1.8–8)
NEUTS SEG NFR BLD: 49 % (ref 32–75)
NRBC # BLD: 0 K/UL (ref 0–0.01)
NRBC BLD-RTO: 0 PER 100 WBC
PLATELET # BLD AUTO: 165 K/UL (ref 150–400)
PMV BLD AUTO: 11 FL (ref 8.9–12.9)
POTASSIUM SERPL-SCNC: 4.1 MMOL/L (ref 3.5–5.1)
PROT SERPL-MCNC: 6.1 G/DL (ref 6.4–8.2)
RBC # BLD AUTO: 3.79 M/UL (ref 4.1–5.7)
SODIUM SERPL-SCNC: 140 MMOL/L (ref 136–145)
THERAPEUTIC RANGE,PTTT: ABNORMAL SECS (ref 58–77)
TRIGL SERPL-MCNC: 48 MG/DL (ref ?–150)
TROPONIN I SERPL-MCNC: 0.36 NG/ML
VLDLC SERPL CALC-MCNC: 9.6 MG/DL
WBC # BLD AUTO: 5 K/UL (ref 4.1–11.1)

## 2020-10-31 PROCEDURE — 85730 THROMBOPLASTIN TIME PARTIAL: CPT

## 2020-10-31 PROCEDURE — 93306 TTE W/DOPPLER COMPLETE: CPT

## 2020-10-31 PROCEDURE — 93306 TTE W/DOPPLER COMPLETE: CPT | Performed by: INTERNAL MEDICINE

## 2020-10-31 PROCEDURE — 99222 1ST HOSP IP/OBS MODERATE 55: CPT | Performed by: SPECIALIST

## 2020-10-31 PROCEDURE — 80061 LIPID PANEL: CPT

## 2020-10-31 PROCEDURE — 74011250636 HC RX REV CODE- 250/636: Performed by: SPECIALIST

## 2020-10-31 PROCEDURE — 74011250637 HC RX REV CODE- 250/637: Performed by: FAMILY MEDICINE

## 2020-10-31 PROCEDURE — 36415 COLL VENOUS BLD VENIPUNCTURE: CPT

## 2020-10-31 PROCEDURE — 84484 ASSAY OF TROPONIN QUANT: CPT

## 2020-10-31 PROCEDURE — 65660000000 HC RM CCU STEPDOWN

## 2020-10-31 PROCEDURE — 74011250636 HC RX REV CODE- 250/636: Performed by: NURSE PRACTITIONER

## 2020-10-31 PROCEDURE — 85025 COMPLETE CBC W/AUTO DIFF WBC: CPT

## 2020-10-31 PROCEDURE — 80053 COMPREHEN METABOLIC PANEL: CPT

## 2020-10-31 PROCEDURE — 74011250636 HC RX REV CODE- 250/636: Performed by: FAMILY MEDICINE

## 2020-10-31 PROCEDURE — 74011250637 HC RX REV CODE- 250/637: Performed by: NURSE PRACTITIONER

## 2020-10-31 PROCEDURE — 93005 ELECTROCARDIOGRAM TRACING: CPT

## 2020-10-31 PROCEDURE — 96366 THER/PROPH/DIAG IV INF ADDON: CPT

## 2020-10-31 PROCEDURE — 99218 HC RM OBSERVATION: CPT

## 2020-10-31 RX ORDER — HEPARIN SODIUM 1000 [USP'U]/ML
2000 INJECTION, SOLUTION INTRAVENOUS; SUBCUTANEOUS ONCE
Status: COMPLETED | OUTPATIENT
Start: 2020-10-31 | End: 2020-10-31

## 2020-10-31 RX ADMIN — ZOLPIDEM TARTRATE 5 MG: 5 TABLET ORAL at 03:11

## 2020-10-31 RX ADMIN — Medication 10 ML: at 22:12

## 2020-10-31 RX ADMIN — LEVOTHYROXINE SODIUM 100 MCG: 100 TABLET ORAL at 08:27

## 2020-10-31 RX ADMIN — ASPIRIN 81 MG: 81 TABLET, CHEWABLE ORAL at 10:29

## 2020-10-31 RX ADMIN — SODIUM CHLORIDE 75 ML/HR: 900 INJECTION, SOLUTION INTRAVENOUS at 10:33

## 2020-10-31 RX ADMIN — SODIUM CHLORIDE 75 ML/HR: 900 INJECTION, SOLUTION INTRAVENOUS at 23:39

## 2020-10-31 RX ADMIN — HEPARIN SODIUM AND DEXTROSE 14 UNITS/KG/HR: 10000; 5 INJECTION INTRAVENOUS at 21:48

## 2020-10-31 RX ADMIN — ZOLPIDEM TARTRATE 5 MG: 5 TABLET ORAL at 23:39

## 2020-10-31 RX ADMIN — LOSARTAN POTASSIUM 100 MG: 50 TABLET, FILM COATED ORAL at 10:29

## 2020-10-31 RX ADMIN — HEPARIN SODIUM 2000 UNITS: 1000 INJECTION INTRAVENOUS; SUBCUTANEOUS at 22:12

## 2020-10-31 RX ADMIN — ROSUVASTATIN 40 MG: 40 TABLET, FILM COATED ORAL at 21:47

## 2020-10-31 RX ADMIN — Medication 10 ML: at 08:27

## 2020-10-31 NOTE — PROGRESS NOTES
[facility]                                                                                          Hospitalist Progress Note  Avis Bray MD  Answering service: [phone] OR [phone] from in house phone              Progress Note    Patient: Lorrie Scruggs MRN: [id number]  SSN: [id number]    YOB: 1950  Age: 69 y.o. Sex: male      Admit Date: 10/30/2020    LOS: 0 days     Subjective:     Presents with sudden onset of chest pain and noted to have non-STEMI on further work-up. Patient is currently on heparin drip and pain-free. Cardiology evaluating the patient. Objective:     Vitals:    10/31/20 0315 10/31/20 0321 10/31/20 0815 10/31/20 0910   BP:  (!) 148/82 (!) 149/4 (!) 149/82   Pulse:  60 (!) 55    Resp:  16 16    Temp:  98.5 °F (36.9 °C) 98.5 °F (36.9 °C)    SpO2:  96% 96%    Weight: 85.6 kg (188 lb 11.4 oz)   85.3 kg (188 lb)   Height:    5' 9\" (1.753 m)        Intake and Output:  Current Shift: No intake/output data recorded. Last three shifts: 10/29 1901 - 10/31 0700  In: 240 [P.O.:240]  Out: -     Physical Exam:   GENERAL: alert, cooperative, no distress, appears stated age  THROAT & NECK: normal and no erythema or exudates noted. LUNG: clear to auscultation bilaterally  HEART: regular rate and rhythm, S1, S2 normal, no murmur, click, rub or gallop  ABDOMEN: soft, non-tender. Bowel sounds normal. No masses,  no organomegaly  EXTREMITIES:  extremities normal, atraumatic, no cyanosis or edema  SKIN: no rash or abnormalities  NEUROLOGIC: AOx3. Gait normal. Reflexes and motor strength normal and symmetric. Cranial nerves 2-12 and sensation grossly intact. PSYCHIATRIC: non focal    Lab/Data Review: All lab results for the last 24 hours reviewed.      Recent Results (from the past 24 hour(s))   EKG, 12 LEAD, INITIAL    Collection Time: 10/30/20 11:36 AM   Result Value Ref Range    Ventricular Rate 73 BPM    Atrial Rate 73 BPM    P-R Interval 172 ms    QRS Duration 106 ms    Q-T Interval 396 ms    QTC Calculation (Bezet) 436 ms    Calculated P Axis 53 degrees    Calculated R Axis 18 degrees    Calculated T Axis 45 degrees    Diagnosis       Normal sinus rhythm  Left atrial enlargement  Incomplete left bundle branch block  When compared with ECG of 13-JUN-2019 12:12,  No significant change was found  Confirmed by Page Crowe M.D., Suzanne Sahni ([id number]) on 10/30/2020 5:29:59 PM     CBC WITH AUTOMATED DIFF    Collection Time: 10/30/20 11:45 AM   Result Value Ref Range    WBC 7.8 4.1 - 11.1 K/uL    RBC 4.13 4.10 - 5.70 M/uL    HGB 13.1 12.1 - 17.0 g/dL    HCT 40.5 36.6 - 50.3 %    MCV 98.1 80.0 - 99.0 FL    MCH 31.7 26.0 - 34.0 PG    MCHC 32.3 30.0 - 36.5 g/dL    RDW 12.7 11.5 - 14.5 %    PLATELET 906 779 - 554 K/uL    MPV 10.4 8.9 - 12.9 FL    NRBC 0.0 0  WBC    ABSOLUTE NRBC 0.00 0.00 - 0.01 K/uL    NEUTROPHILS 69 32 - 75 %    LYMPHOCYTES 20 12 - 49 %    MONOCYTES 9 5 - 13 %    EOSINOPHILS 2 0 - 7 %    BASOPHILS 1 0 - 1 %    IMMATURE GRANULOCYTES 0 0.0 - 0.5 %    ABS. NEUTROPHILS 5.4 1.8 - 8.0 K/UL    ABS. LYMPHOCYTES 1.5 0.8 - 3.5 K/UL    ABS. MONOCYTES 0.7 0.0 - 1.0 K/UL    ABS. EOSINOPHILS 0.1 0.0 - 0.4 K/UL    ABS. BASOPHILS 0.1 0.0 - 0.1 K/UL    ABS. IMM. GRANS. 0.0 0.00 - 0.04 K/UL    DF AUTOMATED     METABOLIC PANEL, COMPREHENSIVE    Collection Time: 10/30/20 11:45 AM   Result Value Ref Range    Sodium 139 136 - 145 mmol/L    Potassium 4.1 3.5 - 5.1 mmol/L    Chloride 103 97 - 108 mmol/L    CO2 26 21 - 32 mmol/L    Anion gap 10 5 - 15 mmol/L    Glucose 106 (H) 65 - 100 mg/dL    BUN 14 6 - 20 MG/DL    Creatinine 1.12 0.70 - 1.30 MG/DL    BUN/Creatinine ratio 13 12 - 20      GFR est AA >60 >60 ml/min/1.73m2    GFR est non-AA >60 >60 ml/min/1.73m2    Calcium 9.5 8.5 - 10.1 MG/DL    Bilirubin, total 0.4 0.2 - 1.0 MG/DL    ALT (SGPT) 26 12 - 78 U/L    AST (SGOT) 29 15 - 37 U/L    Alk.  phosphatase 70 45 - 117 U/L    Protein, total 7.5 6.4 - 8.2 g/dL    Albumin 3.8 3.5 - 5.0 g/dL    Globulin 3.7 2.0 - 4.0 g/dL    A-G Ratio 1.0 (L) 1.1 - 2.2     SAMPLES BEING HELD    Collection Time: 10/30/20 11:45 AM   Result Value Ref Range    SAMPLES BEING HELD 1rd 1bl     COMMENT        Add-on orders for these samples will be processed based on acceptable specimen integrity and analyte stability, which may vary by analyte.    TROPONIN I    Collection Time: 10/30/20 11:45 AM   Result Value Ref Range    Troponin-I, Qt. <0.05 <0.05 ng/mL   EKG, 12 LEAD, INITIAL    Collection Time: 10/30/20  2:36 PM   Result Value Ref Range    Ventricular Rate 51 BPM    Atrial Rate 51 BPM    P-R Interval 184 ms    QRS Duration 94 ms    Q-T Interval 444 ms    QTC Calculation (Bezet) 409 ms    Calculated P Axis 64 degrees    Calculated R Axis 26 degrees    Calculated T Axis 48 degrees    Diagnosis       Sinus bradycardia  Otherwise normal ECG  When compared with ECG of 30-OCT-2020 11:36,  No significant change  Confirmed by Patricia Joyce M.D., Martin Pena ([id number]) on 10/30/2020 5:36:07 PM     TROPONIN I    Collection Time: 10/30/20  8:57 PM   Result Value Ref Range    Troponin-I, Qt. 0.57 (H) <0.05 ng/mL   TSH 3RD GENERATION    Collection Time: 10/30/20  8:57 PM   Result Value Ref Range    TSH 0.43 0.36 - 3.74 uIU/mL   PTT    Collection Time: 10/30/20 10:59 PM   Result Value Ref Range    aPTT 27.9 22.1 - 32.0 sec    aPTT, therapeutic range     58.0 - 77.0 SECS   CBC WITH AUTOMATED DIFF    Collection Time: 10/30/20 10:59 PM   Result Value Ref Range    WBC 5.9 4.1 - 11.1 K/uL    RBC 4.02 (L) 4.10 - 5.70 M/uL    HGB 12.6 12.1 - 17.0 g/dL    HCT 39.0 36.6 - 50.3 %    MCV 97.0 80.0 - 99.0 FL    MCH 31.3 26.0 - 34.0 PG    MCHC 32.3 30.0 - 36.5 g/dL    RDW 12.7 11.5 - 14.5 %    PLATELET 333 486 - 615 K/uL    MPV 10.2 8.9 - 12.9 FL    NRBC 0.0 0  WBC    ABSOLUTE NRBC 0.00 0.00 - 0.01 K/uL    NEUTROPHILS 56 32 - 75 %    LYMPHOCYTES 29 12 - 49 %    MONOCYTES 11 5 - 13 %    EOSINOPHILS 3 0 - 7 %    BASOPHILS 1 0 - 1 % IMMATURE GRANULOCYTES 0 0.0 - 0.5 %    ABS. NEUTROPHILS 3.3 1.8 - 8.0 K/UL    ABS. LYMPHOCYTES 1.7 0.8 - 3.5 K/UL    ABS. MONOCYTES 0.6 0.0 - 1.0 K/UL    ABS. EOSINOPHILS 0.2 0.0 - 0.4 K/UL    ABS. BASOPHILS 0.0 0.0 - 0.1 K/UL    ABS. IMM. GRANS. 0.0 0.00 - 0.04 K/UL    DF AUTOMATED     TROPONIN I    Collection Time: 10/31/20  3:52 AM   Result Value Ref Range    Troponin-I, Qt. 0.36 (H) <0.38 ng/mL   METABOLIC PANEL, COMPREHENSIVE    Collection Time: 10/31/20  3:52 AM   Result Value Ref Range    Sodium 140 136 - 145 mmol/L    Potassium 4.1 3.5 - 5.1 mmol/L    Chloride 108 97 - 108 mmol/L    CO2 24 21 - 32 mmol/L    Anion gap 8 5 - 15 mmol/L    Glucose 93 65 - 100 mg/dL    BUN 25 (H) 6 - 20 MG/DL    Creatinine 0.95 0.70 - 1.30 MG/DL    BUN/Creatinine ratio 26 (H) 12 - 20      GFR est AA >60 >60 ml/min/1.73m2    GFR est non-AA >60 >60 ml/min/1.73m2    Calcium 9.0 8.5 - 10.1 MG/DL    Bilirubin, total 0.4 0.2 - 1.0 MG/DL    ALT (SGPT) 23 12 - 78 U/L    AST (SGOT) 25 15 - 37 U/L    Alk.  phosphatase 59 45 - 117 U/L    Protein, total 6.1 (L) 6.4 - 8.2 g/dL    Albumin 3.2 (L) 3.5 - 5.0 g/dL    Globulin 2.9 2.0 - 4.0 g/dL    A-G Ratio 1.1 1.1 - 2.2     LIPID PANEL    Collection Time: 10/31/20  3:52 AM   Result Value Ref Range    LIPID PROFILE          Cholesterol, total 138 <200 MG/DL    Triglyceride 48 <150 MG/DL    HDL Cholesterol 102 MG/DL    LDL, calculated 26.4 0 - 100 MG/DL    VLDL, calculated 9.6 MG/DL    CHOL/HDL Ratio 1.4 0.0 - 5.0     CBC WITH AUTOMATED DIFF    Collection Time: 10/31/20  3:52 AM   Result Value Ref Range    WBC 5.0 4.1 - 11.1 K/uL    RBC 3.79 (L) 4.10 - 5.70 M/uL    HGB 12.0 (L) 12.1 - 17.0 g/dL    HCT 37.2 36.6 - 50.3 %    MCV 98.2 80.0 - 99.0 FL    MCH 31.7 26.0 - 34.0 PG    MCHC 32.3 30.0 - 36.5 g/dL    RDW 12.7 11.5 - 14.5 %    PLATELET 145 800 - 549 K/uL    MPV 11.0 8.9 - 12.9 FL    NRBC 0.0 0  WBC    ABSOLUTE NRBC 0.00 0.00 - 0.01 K/uL    NEUTROPHILS 49 32 - 75 %    LYMPHOCYTES 34 12 - 49 %    MONOCYTES 12 5 - 13 %    EOSINOPHILS 4 0 - 7 %    BASOPHILS 1 0 - 1 %    IMMATURE GRANULOCYTES 0 0.0 - 0.5 %    ABS. NEUTROPHILS 2.5 1.8 - 8.0 K/UL    ABS. LYMPHOCYTES 1.7 0.8 - 3.5 K/UL    ABS. MONOCYTES 0.6 0.0 - 1.0 K/UL    ABS. EOSINOPHILS 0.2 0.0 - 0.4 K/UL    ABS. BASOPHILS 0.0 0.0 - 0.1 K/UL    ABS. IMM. GRANS. 0.0 0.00 - 0.04 K/UL    DF AUTOMATED     PTT    Collection Time: 10/31/20  3:52 AM   Result Value Ref Range    aPTT 50.4 (H) 22.1 - 32.0 sec    aPTT, therapeutic range     58.0 - 77.0 SECS   PTT    Collection Time: 10/31/20  6:06 AM   Result Value Ref Range    aPTT 59.5 (H) 22.1 - 32.0 sec    aPTT, therapeutic range     58.0 - 77.0 SECS        Imaging:    Xr Chest Port    Result Date: 10/30/2020  Indication: Chest pain Comparison: 6/3/2018 Portable exam of the chest obtained at 1140 demonstrates normal heart size. There is no acute process in the lung fields. Chronic left rotator cuff tear is noted. Degenerative changes are seen in the thoracic spine. Impression: No acute process. Assessment and Plan:     Non-STEMI  -Continue heparin drip along with aspirin and statin  -Not on beta-blocker due to low heart rate  -Cardiology evaluating the patient  -Plan for cardiac cath Monday    Hypertension  -Continue losartan  -Monitor blood pressure    Hypothyroidism  -Continue Synthroid    Dyslipidemia  -On statin    Anticipated disposition is more than 48 hours pending progress.     Signed By: Kush Zuleta MD     October 31, 2020

## 2020-10-31 NOTE — PROGRESS NOTES
Troponin @ 0.36 from 0.57. EKG at last troponin performed - in chart.  Normal EKG showing bradycardia

## 2020-10-31 NOTE — CONSULTS
Date of  Admission: 10/30/2020 11:35 AM     Betsy Holley is a 69 y.o. male admitted for Chest pain [R07.9]  Subjective: We are asked to see for chest pain. Patient has known coronary artery disease with a stent to his left circumflex in April 2018. At that time he had moderate LAD and right coronary artery disease and normal LV function. He has done well since. He exercises several days per week combination of cardio and weights with no worrisome symptoms. Yesterday he was sitting at his desk when he developed midsternal chest heaviness and pressure without associated symptoms. This was different than what he had experienced prior. He took a first and then a few minutes later a second nitro and things really did not get much better so he came to the emergency room. His initial EKG was unremarkable and his pain faded away after a total of about an hour and a half 2 hours. Initial troponin was normal and second troponin was 0.57. Third troponin was 0.36 and he is not had any further symptoms since his pain went away. denies dyspnea, palpitations, syncope, orthopnea, paroxysmal nocturnal dyspnea, exertional chest pressure/discomfort, claudication, lower extremity edema. Cardiac risk factors: dyslipidemia, male gender, hypertension. Assessment/Plan: He clearly seems to had a non-STEMI. He is pain-free and his vitals are stable. He is now on a heparin drip. Blood pressure is reasonable however his heart rates been on the low side so I agree that beta-blockers are not going to be indicated at this point. Would continue aspirin. We will plan to proceed with cardiac catheterization on Monday morning, however if he has recurrent symptoms at rest particularly if they are prolonged and associated with EKG changes he will need an emergent cath.   I have discussed all this with him and his wife in detail including the risks, benefits, and potential alternatives and outcomes to cardiac catheterization and they are agreeable to proceed.     Patient Active Problem List    Diagnosis Date Noted    Chest pain 10/30/2020    Pure hypercholesterolemia 09/03/2018    Essential hypertension 09/03/2018    Hyperthyroidism     CAD (coronary artery disease), native coronary artery 04/30/2018    H/O non-ST elevation myocardial infarction (NSTEMI) 04/30/2018      Pascale Mcgill MD  Past Medical History:   Diagnosis Date    CAD (coronary artery disease), native coronary artery 04/30/2018    Normal KENYA 10-4-19- prior KELLY to Circ, diffuse dx in multiple other vessels up to 50%    H/O non-ST elevation myocardial infarction (NSTEMI) 04/30/2018    NSTEMI, PCI KELLY To Circ 4-30-18- Sofia follows    Hyperlipidemia     Hypertension     Hyperthyroidism     hyperthyroidism      Past Surgical History:   Procedure Laterality Date    CARDIAC SURG PROCEDURE UNLIST      Stent -  Maker    HX ORTHOPAEDIC  09/2013    bilateral knee replacement    HX ORTHOPAEDIC      Right achilles    HX SHOULDER ARTHROSCOPY  2011    Bilateral Shoulders     No Known Allergies   Family History   Problem Relation Age of Onset    Heart Disease Neg Hx       Current Facility-Administered Medications   Medication Dose Route Frequency    levothyroxine (SYNTHROID) tablet 100 mcg  100 mcg Oral ACB    losartan (COZAAR) tablet 100 mg  100 mg Oral DAILY    rosuvastatin (CRESTOR) tablet 40 mg  40 mg Oral QHS    sodium chloride (NS) flush 5-40 mL  5-40 mL IntraVENous Q8H    sodium chloride (NS) flush 5-40 mL  5-40 mL IntraVENous PRN    0.9% sodium chloride infusion  75 mL/hr IntraVENous CONTINUOUS    aspirin chewable tablet 81 mg  81 mg Oral DAILY    nitroglycerin (NITROSTAT) tablet 0.4 mg  0.4 mg SubLINGual Q5MIN PRN    acetaminophen (TYLENOL) tablet 650 mg  650 mg Oral Q4H PRN    heparin 25,000 units in D5W 250 ml infusion  11-25 Units/kg/hr IntraVENous TITRATE    zolpidem (AMBIEN) tablet 10 mg  10 mg Oral QHS PRN Review of Symptoms:  A comprehensive review of systems was negative except for that written in the HPI. Physical Exam    Visit Vitals  BP (!) 148/82 (BP 1 Location: Left arm, BP Patient Position: At rest)   Pulse 60   Temp 98.5 °F (36.9 °C)   Resp 16   Ht 5' 9\" (1.753 m)   Wt 188 lb 11.4 oz (85.6 kg)   SpO2 96%   BMI 27.87 kg/m²     Neck: supple, symmetrical, trachea midline, no adenopathy, no carotid bruit and no JVD  Heart: regular rate and rhythm, S1, S2 normal, no murmur, click, rub or gallop  Lungs: clear to auscultation bilaterally  Abdomen: soft, non-tender.  Bowel sounds normal. No masses,  no organomegaly  Extremities: extremities normal, atraumatic, no cyanosis or edema  Pulses: 2+ and symmetric  Neurologic: Grossly normal    Cardiographics    Telemetry: normal sinus rhythm, sinus mary  ECG: sinus mary, otherwise normal ekg: subsequent tracing incomplete lbbb  Echocardiogram: Not done    Recent radiology, intake/output and wt reviewed    Labs:   Recent Results (from the past 24 hour(s))   EKG, 12 LEAD, INITIAL    Collection Time: 10/30/20 11:36 AM   Result Value Ref Range    Ventricular Rate 73 BPM    Atrial Rate 73 BPM    P-R Interval 172 ms    QRS Duration 106 ms    Q-T Interval 396 ms    QTC Calculation (Bezet) 436 ms    Calculated P Axis 53 degrees    Calculated R Axis 18 degrees    Calculated T Axis 45 degrees    Diagnosis       Normal sinus rhythm  Left atrial enlargement  Incomplete left bundle branch block  When compared with ECG of 13-JUN-2019 12:12,  No significant change was found  Confirmed by Chad Yanez M.D., [facility] ([id number]) on 10/30/2020 5:29:59 PM     CBC WITH AUTOMATED DIFF    Collection Time: 10/30/20 11:45 AM   Result Value Ref Range    WBC 7.8 4.1 - 11.1 K/uL    RBC 4.13 4.10 - 5.70 M/uL    HGB 13.1 12.1 - 17.0 g/dL    HCT 40.5 36.6 - 50.3 %    MCV 98.1 80.0 - 99.0 FL    MCH 31.7 26.0 - 34.0 PG    MCHC 32.3 30.0 - 36.5 g/dL    RDW 12.7 11.5 - 14.5 %    PLATELET 400 009 - 081 K/uL MPV 10.4 8.9 - 12.9 FL    NRBC 0.0 0  WBC    ABSOLUTE NRBC 0.00 0.00 - 0.01 K/uL    NEUTROPHILS 69 32 - 75 %    LYMPHOCYTES 20 12 - 49 %    MONOCYTES 9 5 - 13 %    EOSINOPHILS 2 0 - 7 %    BASOPHILS 1 0 - 1 %    IMMATURE GRANULOCYTES 0 0.0 - 0.5 %    ABS. NEUTROPHILS 5.4 1.8 - 8.0 K/UL    ABS. LYMPHOCYTES 1.5 0.8 - 3.5 K/UL    ABS. MONOCYTES 0.7 0.0 - 1.0 K/UL    ABS. EOSINOPHILS 0.1 0.0 - 0.4 K/UL    ABS. BASOPHILS 0.1 0.0 - 0.1 K/UL    ABS. IMM. GRANS. 0.0 0.00 - 0.04 K/UL    DF AUTOMATED     METABOLIC PANEL, COMPREHENSIVE    Collection Time: 10/30/20 11:45 AM   Result Value Ref Range    Sodium 139 136 - 145 mmol/L    Potassium 4.1 3.5 - 5.1 mmol/L    Chloride 103 97 - 108 mmol/L    CO2 26 21 - 32 mmol/L    Anion gap 10 5 - 15 mmol/L    Glucose 106 (H) 65 - 100 mg/dL    BUN 14 6 - 20 MG/DL    Creatinine 1.12 0.70 - 1.30 MG/DL    BUN/Creatinine ratio 13 12 - 20      GFR est AA >60 >60 ml/min/1.73m2    GFR est non-AA >60 >60 ml/min/1.73m2    Calcium 9.5 8.5 - 10.1 MG/DL    Bilirubin, total 0.4 0.2 - 1.0 MG/DL    ALT (SGPT) 26 12 - 78 U/L    AST (SGOT) 29 15 - 37 U/L    Alk. phosphatase 70 45 - 117 U/L    Protein, total 7.5 6.4 - 8.2 g/dL    Albumin 3.8 3.5 - 5.0 g/dL    Globulin 3.7 2.0 - 4.0 g/dL    A-G Ratio 1.0 (L) 1.1 - 2.2     SAMPLES BEING HELD    Collection Time: 10/30/20 11:45 AM   Result Value Ref Range    SAMPLES BEING HELD 1rd 1bl     COMMENT        Add-on orders for these samples will be processed based on acceptable specimen integrity and analyte stability, which may vary by analyte.    TROPONIN I    Collection Time: 10/30/20 11:45 AM   Result Value Ref Range    Troponin-I, Qt. <0.05 <0.05 ng/mL   EKG, 12 LEAD, INITIAL    Collection Time: 10/30/20  2:36 PM   Result Value Ref Range    Ventricular Rate 51 BPM    Atrial Rate 51 BPM    P-R Interval 184 ms    QRS Duration 94 ms    Q-T Interval 444 ms    QTC Calculation (Bezet) 409 ms    Calculated P Axis 64 degrees    Calculated R Axis 26 degrees Calculated T Axis 48 degrees    Diagnosis       Sinus bradycardia  Otherwise normal ECG  When compared with ECG of 30-OCT-2020 11:36,  No significant change  Confirmed by Hipolito Gallego M.D., [facility] ([id number]) on 10/30/2020 5:36:07 PM     TROPONIN I    Collection Time: 10/30/20  8:57 PM   Result Value Ref Range    Troponin-I, Qt. 0.57 (H) <0.05 ng/mL   TSH 3RD GENERATION    Collection Time: 10/30/20  8:57 PM   Result Value Ref Range    TSH 0.43 0.36 - 3.74 uIU/mL   PTT    Collection Time: 10/30/20 10:59 PM   Result Value Ref Range    aPTT 27.9 22.1 - 32.0 sec    aPTT, therapeutic range     58.0 - 77.0 SECS   CBC WITH AUTOMATED DIFF    Collection Time: 10/30/20 10:59 PM   Result Value Ref Range    WBC 5.9 4.1 - 11.1 K/uL    RBC 4.02 (L) 4.10 - 5.70 M/uL    HGB 12.6 12.1 - 17.0 g/dL    HCT 39.0 36.6 - 50.3 %    MCV 97.0 80.0 - 99.0 FL    MCH 31.3 26.0 - 34.0 PG    MCHC 32.3 30.0 - 36.5 g/dL    RDW 12.7 11.5 - 14.5 %    PLATELET 983 718 - 469 K/uL    MPV 10.2 8.9 - 12.9 FL    NRBC 0.0 0  WBC    ABSOLUTE NRBC 0.00 0.00 - 0.01 K/uL    NEUTROPHILS 56 32 - 75 %    LYMPHOCYTES 29 12 - 49 %    MONOCYTES 11 5 - 13 %    EOSINOPHILS 3 0 - 7 %    BASOPHILS 1 0 - 1 %    IMMATURE GRANULOCYTES 0 0.0 - 0.5 %    ABS. NEUTROPHILS 3.3 1.8 - 8.0 K/UL    ABS. LYMPHOCYTES 1.7 0.8 - 3.5 K/UL    ABS. MONOCYTES 0.6 0.0 - 1.0 K/UL    ABS. EOSINOPHILS 0.2 0.0 - 0.4 K/UL    ABS. BASOPHILS 0.0 0.0 - 0.1 K/UL    ABS. IMM.  GRANS. 0.0 0.00 - 0.04 K/UL    DF AUTOMATED     TROPONIN I    Collection Time: 10/31/20  3:52 AM   Result Value Ref Range    Troponin-I, Qt. 0.36 (H) <6.66 ng/mL   METABOLIC PANEL, COMPREHENSIVE    Collection Time: 10/31/20  3:52 AM   Result Value Ref Range    Sodium 140 136 - 145 mmol/L    Potassium 4.1 3.5 - 5.1 mmol/L    Chloride 108 97 - 108 mmol/L    CO2 24 21 - 32 mmol/L    Anion gap 8 5 - 15 mmol/L    Glucose 93 65 - 100 mg/dL    BUN 25 (H) 6 - 20 MG/DL    Creatinine 0.95 0.70 - 1.30 MG/DL    BUN/Creatinine ratio 26 (H) 12 - 20      GFR est AA >60 >60 ml/min/1.73m2    GFR est non-AA >60 >60 ml/min/1.73m2    Calcium 9.0 8.5 - 10.1 MG/DL    Bilirubin, total 0.4 0.2 - 1.0 MG/DL    ALT (SGPT) 23 12 - 78 U/L    AST (SGOT) 25 15 - 37 U/L    Alk. phosphatase 59 45 - 117 U/L    Protein, total 6.1 (L) 6.4 - 8.2 g/dL    Albumin 3.2 (L) 3.5 - 5.0 g/dL    Globulin 2.9 2.0 - 4.0 g/dL    A-G Ratio 1.1 1.1 - 2.2     LIPID PANEL    Collection Time: 10/31/20  3:52 AM   Result Value Ref Range    LIPID PROFILE          Cholesterol, total 138 <200 MG/DL    Triglyceride 48 <150 MG/DL    HDL Cholesterol 102 MG/DL    LDL, calculated 26.4 0 - 100 MG/DL    VLDL, calculated 9.6 MG/DL    CHOL/HDL Ratio 1.4 0.0 - 5.0     CBC WITH AUTOMATED DIFF    Collection Time: 10/31/20  3:52 AM   Result Value Ref Range    WBC 5.0 4.1 - 11.1 K/uL    RBC 3.79 (L) 4.10 - 5.70 M/uL    HGB 12.0 (L) 12.1 - 17.0 g/dL    HCT 37.2 36.6 - 50.3 %    MCV 98.2 80.0 - 99.0 FL    MCH 31.7 26.0 - 34.0 PG    MCHC 32.3 30.0 - 36.5 g/dL    RDW 12.7 11.5 - 14.5 %    PLATELET 283 960 - 998 K/uL    MPV 11.0 8.9 - 12.9 FL    NRBC 0.0 0  WBC    ABSOLUTE NRBC 0.00 0.00 - 0.01 K/uL    NEUTROPHILS 49 32 - 75 %    LYMPHOCYTES 34 12 - 49 %    MONOCYTES 12 5 - 13 %    EOSINOPHILS 4 0 - 7 %    BASOPHILS 1 0 - 1 %    IMMATURE GRANULOCYTES 0 0.0 - 0.5 %    ABS. NEUTROPHILS 2.5 1.8 - 8.0 K/UL    ABS. LYMPHOCYTES 1.7 0.8 - 3.5 K/UL    ABS. MONOCYTES 0.6 0.0 - 1.0 K/UL    ABS. EOSINOPHILS 0.2 0.0 - 0.4 K/UL    ABS. BASOPHILS 0.0 0.0 - 0.1 K/UL    ABS. IMM.  GRANS. 0.0 0.00 - 0.04 K/UL    DF AUTOMATED     PTT    Collection Time: 10/31/20  3:52 AM   Result Value Ref Range    aPTT 50.4 (H) 22.1 - 32.0 sec    aPTT, therapeutic range     58.0 - 77.0 SECS   PTT    Collection Time: 10/31/20  6:06 AM   Result Value Ref Range    aPTT 59.5 (H) 22.1 - 32.0 sec    aPTT, therapeutic range     58.0 - 77.0 SECS

## 2020-10-31 NOTE — PROGRESS NOTES
Transition of Care Plan   RUR: observation   Disposition: Patient presents from home, discharge to early to determine at this time/ based on care provider recommendations.  Transport: Wife, Last Rodriguez [phone] [id number] Follow up: PCP/Specialist(s)       Reason for Admission:   Chest pressure & dizziness                   RUR Score: observation            Plan for utilizing home health:   Pending care provider recommendations     PCP: First and Last name: Rosalba Paulson    Name of Practice:    Are you a current patient: Yes/No:    Approximate date of last visit:    Can you participate in a virtual visit with your PCP:                     Current Advanced Directive/Advance Care Plan: Patient has Advanced Directive but not with him. Transition of Care Plan:                       CM met with patient, introduced self, explained role, and offered assistance. Patient currently living with his wife, Last Rodriguez in a home with 4 steps to enter. Patient stated he is completing his ADL's/IADL's without the need for any assistance. Patient stated he does have a blood pressure cuff in the home but no other DME at this time. The patient utilizes [facility] on [address]      Care Management Interventions  PCP Verified by CM: Yes  Palliative Care Criteria Met (RRAT>21 & CHF Dx)?: No  Mode of Transport at Discharge:  Other (see comment)(Wife, Last Rodriguez)  Transition of Care Consult (CM Consult): Discharge Planning  MyChart Signup: No  Discharge Durable Medical Equipment: No  Health Maintenance Reviewed: Yes  Physical Therapy Consult: No  Occupational Therapy Consult: No  Speech Therapy Consult: No  Current Support Network: Lives with Spouse  Confirm Follow Up Transport: Family  The Patient and/or Patient Representative was Provided with a Choice of Provider and Agrees with the Discharge Plan?: Yes   Resource Information Provided?: No  Discharge Location  Discharge Placement: Home with family assistance    Observation notice provided in writing to patient and/or caregiver as well as verbal explanation of the policy. Patients who are in outpatient status also receive the Observation notice.       BRITNEY Griffin

## 2020-10-31 NOTE — PROGRESS NOTES
Critical value  - 0.57. No chest pain. Provider notified. Order to start heparin gtt. Dr. Oren Brito notified about change of troponin and heparin gtt.

## 2020-10-31 NOTE — PROGRESS NOTES
Hospitalist Cross Coverage NP     Name: Lorrie Scruggs  YOB: 1950  MRN: [id number]  Admission Date: 10/30/2020 11:35 AM    Date of service: 10/30/2020 10:17 PM                                Overnight update:        Paged by RN due to spike in Troponin. Initially negative <0.05, now 0.57. Pain free at this time, but admitted with chest pain/ACS  - Obtain repeat EKG  - Continue to cycle troponins  - NPO after midnight with cardiology consultation  - Given spike in troponin with chest pain on admission, will start heparin gtt.    - Cards spoke to RN earlier, requested she update cardiologist with change in troponin     Caren ZAZUETA-ARMANDO, PAAlekseyC  [phone] or Marquise

## 2020-10-31 NOTE — PROGRESS NOTES
Bedside shift change report given to Jerilyn Mena (oncoming nurse) by TIMOTHY Arzate (offgoing nurse). Report included the following information SBAR, Kardex, Procedure Summary and Recent Results.

## 2020-10-31 NOTE — PROGRESS NOTES
Problem: Falls - Risk of  Goal: *Absence of Falls  Description: Document Tyler Apo Fall Risk and appropriate interventions in the flowsheet. Outcome: Progressing Towards Goal  Note: Fall Risk Interventions:            Medication Interventions: Teach patient to arise slowly      Bedside and Verbal shift change report given to Cielo Corado (oncoming nurse) by Magen Adrian (offgoing nurse). Report included the following information SBAR, Kardex, Procedure Summary, Intake/Output, MAR, Recent Results, Med Rec Status and Cardiac Rhythm NSR.

## 2020-11-01 LAB
ANION GAP SERPL CALC-SCNC: 4 MMOL/L (ref 5–15)
APTT PPP: 54.1 SEC (ref 22.1–32)
APTT PPP: 54.7 SEC (ref 22.1–32)
APTT PPP: 82.8 SEC (ref 22.1–32)
ATRIAL RATE: 50 BPM
BASOPHILS # BLD: 0 K/UL (ref 0–0.1)
BASOPHILS NFR BLD: 0 % (ref 0–1)
BUN SERPL-MCNC: 17 MG/DL (ref 6–20)
BUN/CREAT SERPL: 18 (ref 12–20)
CALCIUM SERPL-MCNC: 8.7 MG/DL (ref 8.5–10.1)
CALCULATED P AXIS, ECG09: 28 DEGREES
CALCULATED R AXIS, ECG10: 30 DEGREES
CALCULATED T AXIS, ECG11: 53 DEGREES
CHLORIDE SERPL-SCNC: 111 MMOL/L (ref 97–108)
CO2 SERPL-SCNC: 25 MMOL/L (ref 21–32)
CREAT SERPL-MCNC: 0.93 MG/DL (ref 0.7–1.3)
DIAGNOSIS, 93000: NORMAL
DIFFERENTIAL METHOD BLD: ABNORMAL
EOSINOPHIL # BLD: 0.2 K/UL (ref 0–0.4)
EOSINOPHIL NFR BLD: 4 % (ref 0–7)
ERYTHROCYTE [DISTWIDTH] IN BLOOD BY AUTOMATED COUNT: 12.5 % (ref 11.5–14.5)
GLUCOSE SERPL-MCNC: 97 MG/DL (ref 65–100)
HCT VFR BLD AUTO: 36.6 % (ref 36.6–50.3)
HGB BLD-MCNC: 11.8 G/DL (ref 12.1–17)
IMM GRANULOCYTES # BLD AUTO: 0 K/UL (ref 0–0.04)
IMM GRANULOCYTES NFR BLD AUTO: 0 % (ref 0–0.5)
LYMPHOCYTES # BLD: 1.5 K/UL (ref 0.8–3.5)
LYMPHOCYTES NFR BLD: 33 % (ref 12–49)
MCH RBC QN AUTO: 31.6 PG (ref 26–34)
MCHC RBC AUTO-ENTMCNC: 32.2 G/DL (ref 30–36.5)
MCV RBC AUTO: 98.1 FL (ref 80–99)
MONOCYTES # BLD: 0.5 K/UL (ref 0–1)
MONOCYTES NFR BLD: 11 % (ref 5–13)
NEUTS SEG # BLD: 2.3 K/UL (ref 1.8–8)
NEUTS SEG NFR BLD: 52 % (ref 32–75)
NRBC # BLD: 0 K/UL (ref 0–0.01)
NRBC BLD-RTO: 0 PER 100 WBC
P-R INTERVAL, ECG05: 184 MS
PLATELET # BLD AUTO: 157 K/UL (ref 150–400)
PMV BLD AUTO: 10.9 FL (ref 8.9–12.9)
POTASSIUM SERPL-SCNC: 3.6 MMOL/L (ref 3.5–5.1)
Q-T INTERVAL, ECG07: 454 MS
QRS DURATION, ECG06: 106 MS
QTC CALCULATION (BEZET), ECG08: 413 MS
RBC # BLD AUTO: 3.73 M/UL (ref 4.1–5.7)
SODIUM SERPL-SCNC: 140 MMOL/L (ref 136–145)
THERAPEUTIC RANGE,PTTT: ABNORMAL SECS (ref 58–77)
VENTRICULAR RATE, ECG03: 50 BPM
WBC # BLD AUTO: 4.6 K/UL (ref 4.1–11.1)

## 2020-11-01 PROCEDURE — 99233 SBSQ HOSP IP/OBS HIGH 50: CPT | Performed by: INTERNAL MEDICINE

## 2020-11-01 PROCEDURE — 74011250637 HC RX REV CODE- 250/637: Performed by: FAMILY MEDICINE

## 2020-11-01 PROCEDURE — 74011250637 HC RX REV CODE- 250/637: Performed by: INTERNAL MEDICINE

## 2020-11-01 PROCEDURE — 74011250637 HC RX REV CODE- 250/637: Performed by: NURSE PRACTITIONER

## 2020-11-01 PROCEDURE — 74011250636 HC RX REV CODE- 250/636: Performed by: NURSE PRACTITIONER

## 2020-11-01 PROCEDURE — 85730 THROMBOPLASTIN TIME PARTIAL: CPT

## 2020-11-01 PROCEDURE — 36415 COLL VENOUS BLD VENIPUNCTURE: CPT

## 2020-11-01 PROCEDURE — 85025 COMPLETE CBC W/AUTO DIFF WBC: CPT

## 2020-11-01 PROCEDURE — 65660000000 HC RM CCU STEPDOWN

## 2020-11-01 PROCEDURE — 80048 BASIC METABOLIC PNL TOTAL CA: CPT

## 2020-11-01 RX ORDER — AMLODIPINE BESYLATE 5 MG/1
5 TABLET ORAL DAILY
Status: DISCONTINUED | OUTPATIENT
Start: 2020-11-01 | End: 2020-11-02 | Stop reason: HOSPADM

## 2020-11-01 RX ADMIN — HEPARIN SODIUM AND DEXTROSE 12 UNITS/KG/HR: 10000; 5 INJECTION INTRAVENOUS at 06:52

## 2020-11-01 RX ADMIN — ZOLPIDEM TARTRATE 5 MG: 5 TABLET ORAL at 02:18

## 2020-11-01 RX ADMIN — ASPIRIN 81 MG: 81 TABLET, CHEWABLE ORAL at 08:17

## 2020-11-01 RX ADMIN — LOSARTAN POTASSIUM 100 MG: 50 TABLET, FILM COATED ORAL at 08:20

## 2020-11-01 RX ADMIN — ZOLPIDEM TARTRATE 10 MG: 5 TABLET ORAL at 23:26

## 2020-11-01 RX ADMIN — AMLODIPINE BESYLATE 5 MG: 5 TABLET ORAL at 12:00

## 2020-11-01 RX ADMIN — Medication 10 ML: at 14:00

## 2020-11-01 RX ADMIN — HEPARIN SODIUM AND DEXTROSE 14 UNITS/KG/HR: 10000; 5 INJECTION INTRAVENOUS at 22:36

## 2020-11-01 RX ADMIN — LEVOTHYROXINE SODIUM 100 MCG: 100 TABLET ORAL at 06:58

## 2020-11-01 RX ADMIN — HEPARIN SODIUM AND DEXTROSE 13 UNITS/KG/HR: 10000; 5 INJECTION INTRAVENOUS at 15:06

## 2020-11-01 RX ADMIN — ROSUVASTATIN 40 MG: 40 TABLET, FILM COATED ORAL at 21:08

## 2020-11-01 RX ADMIN — Medication 10 ML: at 21:08

## 2020-11-01 NOTE — PROGRESS NOTES
The ending of Daylight Saving Time occurred at 0200 hrs.  Documentation of patient care and medications administered is done with respect to the time change.

## 2020-11-01 NOTE — PROGRESS NOTES
[address] [facility]  Hospitalist Group                                                                                          Hospitalist Progress Note  Daron Vazquez MD  Answering service: [phone] OR [phone] from in house phone              Progress Note    Patient: Idris Lopez MRN: [id number]  SSN: [id number]    YOB: 1950  Age: 69 y.o. Sex: male      Admit Date: 10/30/2020    LOS: 1 day     Subjective:     Presents with sudden onset of chest pain and noted to have non-STEMI on further work-up. Patient is currently on heparin drip and pain-free. Cardiology evaluating the patient. Objective:     Vitals:    11/01/20 0402 11/01/20 0649 11/01/20 0809 11/01/20 1314   BP: 136/80  (!) 154/74 128/68   Pulse: (!) 45  (!) 44 (!) 51   Resp: 18  17 18   Temp: 98 °F (36.7 °C)  97.9 °F (36.6 °C) 97.5 °F (36.4 °C)   SpO2: 94%  94% 96%   Weight:  86.9 kg (191 lb 9.3 oz)     Height:            Intake and Output:  Current Shift: No intake/output data recorded. Last three shifts: 10/30 1901 - 11/01 0700  In: 480 [P.O.:480]  Out: -     Physical Exam:   GENERAL: alert, cooperative, no distress, appears stated age  THROAT & NECK: normal and no erythema or exudates noted. LUNG: clear to auscultation bilaterally  HEART: regular rate and rhythm, S1, S2 normal, no murmur, click, rub or gallop  ABDOMEN: soft, non-tender. Bowel sounds normal. No masses,  no organomegaly  EXTREMITIES:  extremities normal, atraumatic, no cyanosis or edema  SKIN: no rash or abnormalities  NEUROLOGIC: AOx3. Gait normal. Reflexes and motor strength normal and symmetric. Cranial nerves 2-12 and sensation grossly intact. PSYCHIATRIC: non focal    Lab/Data Review: All lab results for the last 24 hours reviewed.      Recent Results (from the past 24 hour(s))   PTT    Collection Time: 10/31/20  7:35 PM   Result Value Ref Range    aPTT 47.3 (H) 22.1 - 32.0 sec    aPTT, therapeutic range     58.0 - 77.0 SECS   PTT Collection Time: 11/01/20  4:05 AM   Result Value Ref Range    aPTT 82.8 (H) 22.1 - 32.0 sec    aPTT, therapeutic range     58.0 - 16.8 SECS   METABOLIC PANEL, BASIC    Collection Time: 11/01/20  4:05 AM   Result Value Ref Range    Sodium 140 136 - 145 mmol/L    Potassium 3.6 3.5 - 5.1 mmol/L    Chloride 111 (H) 97 - 108 mmol/L    CO2 25 21 - 32 mmol/L    Anion gap 4 (L) 5 - 15 mmol/L    Glucose 97 65 - 100 mg/dL    BUN 17 6 - 20 MG/DL    Creatinine 0.93 0.70 - 1.30 MG/DL    BUN/Creatinine ratio 18 12 - 20      GFR est AA >60 >60 ml/min/1.73m2    GFR est non-AA >60 >60 ml/min/1.73m2    Calcium 8.7 8.5 - 10.1 MG/DL   CBC WITH AUTOMATED DIFF    Collection Time: 11/01/20  4:05 AM   Result Value Ref Range    WBC 4.6 4.1 - 11.1 K/uL    RBC 3.73 (L) 4.10 - 5.70 M/uL    HGB 11.8 (L) 12.1 - 17.0 g/dL    HCT 36.6 36.6 - 50.3 %    MCV 98.1 80.0 - 99.0 FL    MCH 31.6 26.0 - 34.0 PG    MCHC 32.2 30.0 - 36.5 g/dL    RDW 12.5 11.5 - 14.5 %    PLATELET 496 691 - 861 K/uL    MPV 10.9 8.9 - 12.9 FL    NRBC 0.0 0  WBC    ABSOLUTE NRBC 0.00 0.00 - 0.01 K/uL    NEUTROPHILS 52 32 - 75 %    LYMPHOCYTES 33 12 - 49 %    MONOCYTES 11 5 - 13 %    EOSINOPHILS 4 0 - 7 %    BASOPHILS 0 0 - 1 %    IMMATURE GRANULOCYTES 0 0.0 - 0.5 %    ABS. NEUTROPHILS 2.3 1.8 - 8.0 K/UL    ABS. LYMPHOCYTES 1.5 0.8 - 3.5 K/UL    ABS. MONOCYTES 0.5 0.0 - 1.0 K/UL    ABS. EOSINOPHILS 0.2 0.0 - 0.4 K/UL    ABS. BASOPHILS 0.0 0.0 - 0.1 K/UL    ABS. IMM. GRANS. 0.0 0.00 - 0.04 K/UL    DF AUTOMATED          Imaging:    No results found. Assessment and Plan:     Non-STEMI  -Continue heparin drip along with aspirin and statin  -Not on beta-blocker due to low heart rate  -Cardiology evaluating the patient  -Plan for cardiac cath Monday    Hypertension  -Continue losartan, added Norvasc today  -Monitor blood pressure    Hypothyroidism  -Continue Synthroid    Dyslipidemia  -On statin    Anticipated disposition is more than 48 hours pending progress.     Signed By: Jarod Smith MD     November 1, 2020

## 2020-11-01 NOTE — PROGRESS NOTES
Bedside shift change report given to [facility] (oncoming nurse) by Guzman Wilson (offgoing nurse). Report included the following information SBAR, Kardex, Intake/Output and MAR.

## 2020-11-01 NOTE — ROUTINE PROCESS
Bedside shift change report given to RONNY Chapa (oncoming nurse) by Emely Roth (offgoing nurse).  Report included the following information SBAR, Kardex, MAR, Recent Results and Cardiac Rhythm SR.

## 2020-11-01 NOTE — PROGRESS NOTES
Problem: Falls - Risk of  Goal: *Absence of Falls  Description: Document Donna Garcia Fall Risk and appropriate interventions in the flowsheet. Outcome: Progressing Towards Goal  Note: Fall Risk Interventions:            Medication Interventions: Assess postural VS orthostatic hypotension      Bedside and Verbal shift change report given to Moe Zarco (oncoming nurse) by Elmer Humphrey (offgoing nurse). Report included the following information SBAR, Kardex, Procedure Summary, Intake/Output, MAR, Accordion, Recent Results and Cardiac Rhythm NSR.

## 2020-11-01 NOTE — PROGRESS NOTES
Cardiology Progress Note         11/1/2020 10:37 AM    Admit Date: 10/30/2020    Admit Diagnosis: Chest pain [R07.9]; Chest pain [R07.9]      Subjective:     Shell Munson is seen for Dr Costa Diana  He and his wife said he has no more chest pain  He was surprised his troponin was not higher    10/30/20   ECHO ADULT COMPLETE 10/31/2020 10/31/2020    Narrative · LV: Calculated LVEF is 53%. Biplane method used to measure ejection   fraction. Mild aortic valve regurgitation is   Present.  Sinus bradycardia         Problem List  Date Reviewed: 10/31/2020          Codes Class Noted    Chest pain ICD-10-CM: R07.9  ICD-9-CM: 786.50  10/30/2020        Hyperthyroidism ICD-10-CM: E05.90  ICD-9-CM: 242.90  Unknown    Overview Signed 9/3/2018  9:35 PM by Cara Bautista MD     hyperthyroidism             Pure hypercholesterolemia ICD-10-CM: E78.00  ICD-9-CM: 272.0  9/3/2018        Essential hypertension ICD-10-CM: I10  ICD-9-CM: 401.9  9/3/2018        CAD (coronary artery disease), native coronary artery ICD-10-CM: I25.10  ICD-9-CM: 414.01  4/30/2018    Overview Signed 10/6/2019  1:14 PM by Cara Bautista MD     Normal KENYA 10-4-19- prior KELLY to Circ, diffuse dx in multiple other vessels up to 50%             H/O non-ST elevation myocardial infarction (NSTEMI) ICD-10-CM: I25.2  ICD-9-CM: 412  4/30/2018    Overview Signed 10/6/2019  1:15 PM by Cara Bautista MD     NSTEMI, PCI KELLY To Circ 4-30-18- Sofia follows                 Past Medical History:   Diagnosis Date    CAD (coronary artery disease), native coronary artery 04/30/2018    Normal KENYA 10-4-19- prior KELLY to Circ, diffuse dx in multiple other vessels up to 50%    H/O non-ST elevation myocardial infarction (NSTEMI) 04/30/2018    NSTEMI, PCI KELLY To Circ 4-30-18- Sofia follows    Hyperlipidemia     Hypertension     Hyperthyroidism     hyperthyroidism     Past Surgical History:   Procedure Laterality Date    CARDIAC SURG PROCEDURE UNLIST      Stent -  Maker    HX ORTHOPAEDIC  09/2013    bilateral knee replacement    HX ORTHOPAEDIC      Right achilles    HX SHOULDER ARTHROSCOPY  2011    Bilateral Shoulders     Social History     Socioeconomic History    Marital status:      Spouse name: Not on file    Number of children: Not on file    Years of education: Not on file    Highest education level: Not on file   Occupational History    Not on file   Social Needs    Financial resource strain: Not on file    Food insecurity     Worry: Not on file     Inability: Not on file    Transportation needs     Medical: Not on file     Non-medical: Not on file   Tobacco Use    Smoking status: Never Smoker    Smokeless tobacco: Never Used   Substance and Sexual Activity    Alcohol use:  Yes     Alcohol/week: 10.0 standard drinks     Types: 10 Shots of liquor per week     Comment: 2 shots/day    Drug use: No    Sexual activity: Not on file   Lifestyle    Physical activity     Days per week: Not on file     Minutes per session: Not on file    Stress: Not on file   Relationships    Social connections     Talks on phone: Not on file     Gets together: Not on file     Attends Denominational service: Not on file     Active member of club or organization: Not on file     Attends meetings of clubs or organizations: Not on file     Relationship status: Not on file    Intimate partner violence     Fear of current or ex partner: Not on file     Emotionally abused: Not on file     Physically abused: Not on file     Forced sexual activity: Not on file   Other Topics Concern    Not on file   Social History Narrative    Not on file           Current Facility-Administered Medications   Medication Dose Route Frequency    levothyroxine (SYNTHROID) tablet 100 mcg  100 mcg Oral ACB    losartan (COZAAR) tablet 100 mg  100 mg Oral DAILY    rosuvastatin (CRESTOR) tablet 40 mg  40 mg Oral QHS    sodium chloride (NS) flush 5-40 mL  5-40 mL IntraVENous Q8H    sodium chloride (NS) flush 5-40 mL  5-40 mL IntraVENous PRN    0.9% sodium chloride infusion  75 mL/hr IntraVENous CONTINUOUS    aspirin chewable tablet 81 mg  81 mg Oral DAILY    nitroglycerin (NITROSTAT) tablet 0.4 mg  0.4 mg SubLINGual Q5MIN PRN    acetaminophen (TYLENOL) tablet 650 mg  650 mg Oral Q4H PRN    heparin 25,000 units in D5W 250 ml infusion  11-25 Units/kg/hr IntraVENous TITRATE    zolpidem (AMBIEN) tablet 10 mg  10 mg Oral QHS PRN         Objective:      Physical Exam:  Visit Vitals  BP (!) 154/74 (BP 1 Location: Left arm, BP Patient Position: At rest) Comment: BP done three times   Pulse (!) 44   Temp 97.9 °F (36.6 °C)   Resp 17   Ht 5' 9\" (1.753 m)   Wt 191 lb 9.3 oz (86.9 kg)   SpO2 94%   BMI 28.29 kg/m²     General Appearance:  Well developed, well nourished,alert and oriented x 3, and individual in no acute distress. Ears/Nose/Mouth/Throat:   Hearing grossly normal.         Neck: Supple. Chest:   Lungs clear to auscultation bilaterally. Cardiovascular:  Regular rate and rhythm, no murmur. Abdomen:   Soft, non-tender, bowel sounds are active. Extremities: No edema bilaterally. Skin: Warm and dry.                Data Review:   Labs:    Recent Results (from the past 24 hour(s))   PTT    Collection Time: 10/31/20 12:03 PM   Result Value Ref Range    aPTT 51.6 (H) 22.1 - 32.0 sec    aPTT, therapeutic range     58.0 - 77.0 SECS   PTT    Collection Time: 10/31/20  7:35 PM   Result Value Ref Range    aPTT 47.3 (H) 22.1 - 32.0 sec    aPTT, therapeutic range     58.0 - 77.0 SECS   PTT    Collection Time: 11/01/20  4:05 AM   Result Value Ref Range    aPTT 82.8 (H) 22.1 - 32.0 sec    aPTT, therapeutic range     58.0 - 07.7 SECS   METABOLIC PANEL, BASIC    Collection Time: 11/01/20  4:05 AM   Result Value Ref Range    Sodium 140 136 - 145 mmol/L    Potassium 3.6 3.5 - 5.1 mmol/L    Chloride 111 (H) 97 - 108 mmol/L    CO2 25 21 - 32 mmol/L    Anion gap 4 (L) 5 - 15 mmol/L    Glucose 97 65 - 100 mg/dL    BUN 17 6 - 20 MG/DL    Creatinine 0.93 0.70 - 1.30 MG/DL    BUN/Creatinine ratio 18 12 - 20      GFR est AA >60 >60 ml/min/1.73m2    GFR est non-AA >60 >60 ml/min/1.73m2    Calcium 8.7 8.5 - 10.1 MG/DL   CBC WITH AUTOMATED DIFF    Collection Time: 11/01/20  4:05 AM   Result Value Ref Range    WBC 4.6 4.1 - 11.1 K/uL    RBC 3.73 (L) 4.10 - 5.70 M/uL    HGB 11.8 (L) 12.1 - 17.0 g/dL    HCT 36.6 36.6 - 50.3 %    MCV 98.1 80.0 - 99.0 FL    MCH 31.6 26.0 - 34.0 PG    MCHC 32.2 30.0 - 36.5 g/dL    RDW 12.5 11.5 - 14.5 %    PLATELET 754 472 - 118 K/uL    MPV 10.9 8.9 - 12.9 FL    NRBC 0.0 0  WBC    ABSOLUTE NRBC 0.00 0.00 - 0.01 K/uL    NEUTROPHILS 52 32 - 75 %    LYMPHOCYTES 33 12 - 49 %    MONOCYTES 11 5 - 13 %    EOSINOPHILS 4 0 - 7 %    BASOPHILS 0 0 - 1 %    IMMATURE GRANULOCYTES 0 0.0 - 0.5 %    ABS. NEUTROPHILS 2.3 1.8 - 8.0 K/UL    ABS. LYMPHOCYTES 1.5 0.8 - 3.5 K/UL    ABS. MONOCYTES 0.5 0.0 - 1.0 K/UL    ABS. EOSINOPHILS 0.2 0.0 - 0.4 K/UL    ABS. BASOPHILS 0.0 0.0 - 0.1 K/UL    ABS. IMM. GRANS. 0.0 0.00 - 0.04 K/UL    DF AUTOMATED         Telemetry: normal sinus rhythm      Assessment:     Active Problems:    Chest pain (10/30/2020)    NSTEMI    Hx of Lcx stent    Plan:     He had a non-STEMI. He is chest pain-free and his heart rate is not high enough for beta blocker  Blood pressure is high so need to adjust medication  Would continue aspirin and crestor. We will plan to proceed with cardiac catheterization on Monday morning  I have discussed all this with him and his wife in detail including the risks, benefits, and potential alternatives and outcomes to cardiac catheterization and they are agreeable to proceed. Hold heparin in am    Meagan Hernandes M.D.  [facility]  Electrophysiology/Cardiology  [facility]  [address] [place] [address]                              [phone]

## 2020-11-02 VITALS
WEIGHT: 193.78 LBS | HEIGHT: 69 IN | TEMPERATURE: 97.3 F | DIASTOLIC BLOOD PRESSURE: 72 MMHG | SYSTOLIC BLOOD PRESSURE: 140 MMHG | BODY MASS INDEX: 28.7 KG/M2 | HEART RATE: 89 BPM | OXYGEN SATURATION: 97 % | RESPIRATION RATE: 18 BRPM

## 2020-11-02 LAB
ANION GAP SERPL CALC-SCNC: 6 MMOL/L (ref 5–15)
APTT PPP: 63.9 SEC (ref 22.1–32)
BUN SERPL-MCNC: 16 MG/DL (ref 6–20)
BUN/CREAT SERPL: 17 (ref 12–20)
CALCIUM SERPL-MCNC: 8.9 MG/DL (ref 8.5–10.1)
CHLORIDE SERPL-SCNC: 112 MMOL/L (ref 97–108)
CO2 SERPL-SCNC: 24 MMOL/L (ref 21–32)
CREAT SERPL-MCNC: 0.94 MG/DL (ref 0.7–1.3)
GLUCOSE SERPL-MCNC: 93 MG/DL (ref 65–100)
POTASSIUM SERPL-SCNC: 3.9 MMOL/L (ref 3.5–5.1)
SODIUM SERPL-SCNC: 142 MMOL/L (ref 136–145)
THERAPEUTIC RANGE,PTTT: ABNORMAL SECS (ref 58–77)

## 2020-11-02 PROCEDURE — 4A023N7 MEASUREMENT OF CARDIAC SAMPLING AND PRESSURE, LEFT HEART, PERCUTANEOUS APPROACH: ICD-10-PCS | Performed by: INTERNAL MEDICINE

## 2020-11-02 PROCEDURE — 99152 MOD SED SAME PHYS/QHP 5/>YRS: CPT | Performed by: INTERNAL MEDICINE

## 2020-11-02 PROCEDURE — 85730 THROMBOPLASTIN TIME PARTIAL: CPT

## 2020-11-02 PROCEDURE — 74011250637 HC RX REV CODE- 250/637: Performed by: INTERNAL MEDICINE

## 2020-11-02 PROCEDURE — 77030015766: Performed by: INTERNAL MEDICINE

## 2020-11-02 PROCEDURE — 93458 L HRT ARTERY/VENTRICLE ANGIO: CPT | Performed by: INTERNAL MEDICINE

## 2020-11-02 PROCEDURE — 77030010221 HC SPLNT WR POS TELE -B: Performed by: INTERNAL MEDICINE

## 2020-11-02 PROCEDURE — 36415 COLL VENOUS BLD VENIPUNCTURE: CPT

## 2020-11-02 PROCEDURE — 80048 BASIC METABOLIC PNL TOTAL CA: CPT

## 2020-11-02 PROCEDURE — C1769 GUIDE WIRE: HCPCS | Performed by: INTERNAL MEDICINE

## 2020-11-02 PROCEDURE — 74011250636 HC RX REV CODE- 250/636: Performed by: INTERNAL MEDICINE

## 2020-11-02 PROCEDURE — 74011250636 HC RX REV CODE- 250/636: Performed by: FAMILY MEDICINE

## 2020-11-02 PROCEDURE — 74011250637 HC RX REV CODE- 250/637: Performed by: FAMILY MEDICINE

## 2020-11-02 PROCEDURE — 74011000636 HC RX REV CODE- 636: Performed by: INTERNAL MEDICINE

## 2020-11-02 PROCEDURE — 74011000250 HC RX REV CODE- 250: Performed by: INTERNAL MEDICINE

## 2020-11-02 PROCEDURE — C1894 INTRO/SHEATH, NON-LASER: HCPCS | Performed by: INTERNAL MEDICINE

## 2020-11-02 PROCEDURE — B2111ZZ FLUOROSCOPY OF MULTIPLE CORONARY ARTERIES USING LOW OSMOLAR CONTRAST: ICD-10-PCS | Performed by: INTERNAL MEDICINE

## 2020-11-02 PROCEDURE — 77030019569 HC BND COMPR RAD TERU -B: Performed by: INTERNAL MEDICINE

## 2020-11-02 PROCEDURE — 99153 MOD SED SAME PHYS/QHP EA: CPT | Performed by: INTERNAL MEDICINE

## 2020-11-02 RX ORDER — CLOPIDOGREL BISULFATE 75 MG/1
75 TABLET ORAL DAILY
Qty: 30 TAB | Refills: 0 | Status: SHIPPED | OUTPATIENT
Start: 2020-11-02

## 2020-11-02 RX ORDER — SODIUM CHLORIDE 0.9 % (FLUSH) 0.9 %
5-40 SYRINGE (ML) INJECTION EVERY 8 HOURS
Status: DISCONTINUED | OUTPATIENT
Start: 2020-11-02 | End: 2020-11-02 | Stop reason: HOSPADM

## 2020-11-02 RX ORDER — LOSARTAN POTASSIUM 100 MG/1
100 TABLET ORAL DAILY
Qty: 30 TAB | Refills: 0 | Status: SHIPPED | OUTPATIENT
Start: 2020-11-03

## 2020-11-02 RX ORDER — ONDANSETRON 2 MG/ML
4 INJECTION INTRAMUSCULAR; INTRAVENOUS
Status: DISCONTINUED | OUTPATIENT
Start: 2020-11-02 | End: 2020-11-02 | Stop reason: HOSPADM

## 2020-11-02 RX ORDER — LIDOCAINE HYDROCHLORIDE 10 MG/ML
INJECTION INFILTRATION; PERINEURAL AS NEEDED
Status: DISCONTINUED | OUTPATIENT
Start: 2020-11-02 | End: 2020-11-02 | Stop reason: HOSPADM

## 2020-11-02 RX ORDER — HEPARIN SODIUM 1000 [USP'U]/ML
INJECTION, SOLUTION INTRAVENOUS; SUBCUTANEOUS AS NEEDED
Status: DISCONTINUED | OUTPATIENT
Start: 2020-11-02 | End: 2020-11-02 | Stop reason: HOSPADM

## 2020-11-02 RX ORDER — AMLODIPINE BESYLATE 5 MG/1
5 TABLET ORAL DAILY
Qty: 30 TAB | Refills: 0 | Status: SHIPPED | OUTPATIENT
Start: 2020-11-03

## 2020-11-02 RX ORDER — VERAPAMIL HYDROCHLORIDE 2.5 MG/ML
INJECTION, SOLUTION INTRAVENOUS AS NEEDED
Status: DISCONTINUED | OUTPATIENT
Start: 2020-11-02 | End: 2020-11-02 | Stop reason: HOSPADM

## 2020-11-02 RX ORDER — MIDAZOLAM HYDROCHLORIDE 1 MG/ML
INJECTION, SOLUTION INTRAMUSCULAR; INTRAVENOUS AS NEEDED
Status: DISCONTINUED | OUTPATIENT
Start: 2020-11-02 | End: 2020-11-02 | Stop reason: HOSPADM

## 2020-11-02 RX ORDER — HEPARIN SODIUM 200 [USP'U]/100ML
INJECTION, SOLUTION INTRAVENOUS
Status: COMPLETED | OUTPATIENT
Start: 2020-11-02 | End: 2020-11-02

## 2020-11-02 RX ORDER — FENTANYL CITRATE 50 UG/ML
INJECTION, SOLUTION INTRAMUSCULAR; INTRAVENOUS AS NEEDED
Status: DISCONTINUED | OUTPATIENT
Start: 2020-11-02 | End: 2020-11-02 | Stop reason: HOSPADM

## 2020-11-02 RX ORDER — CLOPIDOGREL BISULFATE 75 MG/1
75 TABLET ORAL DAILY
Status: DISCONTINUED | OUTPATIENT
Start: 2020-11-02 | End: 2020-11-02 | Stop reason: HOSPADM

## 2020-11-02 RX ORDER — SODIUM CHLORIDE 0.9 % (FLUSH) 0.9 %
SYRINGE (ML) INJECTION AS NEEDED
Status: DISCONTINUED | OUTPATIENT
Start: 2020-11-02 | End: 2020-11-02 | Stop reason: HOSPADM

## 2020-11-02 RX ORDER — SODIUM CHLORIDE 0.9 % (FLUSH) 0.9 %
5-40 SYRINGE (ML) INJECTION AS NEEDED
Status: DISCONTINUED | OUTPATIENT
Start: 2020-11-02 | End: 2020-11-02 | Stop reason: HOSPADM

## 2020-11-02 RX ORDER — SODIUM CHLORIDE 9 MG/ML
1000 INJECTION, SOLUTION INTRAVENOUS CONTINUOUS
Status: DISCONTINUED | OUTPATIENT
Start: 2020-11-02 | End: 2020-11-02 | Stop reason: HOSPADM

## 2020-11-02 RX ADMIN — ASPIRIN 81 MG: 81 TABLET, CHEWABLE ORAL at 11:55

## 2020-11-02 RX ADMIN — LEVOTHYROXINE SODIUM 100 MCG: 100 TABLET ORAL at 07:17

## 2020-11-02 RX ADMIN — SODIUM CHLORIDE 75 ML/HR: 900 INJECTION, SOLUTION INTRAVENOUS at 04:54

## 2020-11-02 RX ADMIN — LOSARTAN POTASSIUM 100 MG: 50 TABLET, FILM COATED ORAL at 11:55

## 2020-11-02 RX ADMIN — CLOPIDOGREL BISULFATE 75 MG: 75 TABLET ORAL at 14:56

## 2020-11-02 RX ADMIN — AMLODIPINE BESYLATE 5 MG: 5 TABLET ORAL at 11:55

## 2020-11-02 NOTE — DISCHARGE INSTRUCTIONS
Patient Education        Learning About Coronary Artery Disease (CAD)  What is coronary artery disease? Coronary artery disease (CAD) occurs when plaque builds up in the arteries that bring oxygen-rich blood to your heart. Plaque is a fatty substance made of cholesterol, calcium, and other substances in the blood. This process is called hardening of the arteries, or atherosclerosis. What happens when you have coronary artery disease? · Plaque may narrow the coronary arteries. Narrowed arteries cause poor blood flow. This can lead to angina symptoms such as chest pain or discomfort. If blood flow is completely blocked, you could have a heart attack. · You can slow CAD and reduce the risk of future problems by making changes in your lifestyle. These include quitting smoking and eating heart-healthy foods. · Treatments for CAD, along with changes in your lifestyle, can help you live a longer and healthier life. How can you prevent coronary artery disease? · Do not smoke. It may be the best thing you can do to prevent heart disease. If you need help quitting, talk to your doctor about stop-smoking programs and medicines. These can increase your chances of quitting for good. · Be active. Get at least 30 minutes of exercise on most days of the week. Walking is a good choice. You also may want to do other activities, such as running, swimming, cycling, or playing tennis or team sports. · Eat heart-healthy foods. Eat more fruits and vegetables and less foods that contain saturated and trans fats. Limit alcohol, sodium, and sweets. · Stay at a healthy weight. Lose weight if you need to. · Manage other health problems such as diabetes, high blood pressure, and high cholesterol. How is coronary artery disease treated? · Your doctor will suggest that you make lifestyle changes. For example, your doctor may ask you to eat healthy foods, quit smoking, lose extra weight, and be more active.   · You will have to take medicines. · Your doctor may suggest a procedure to open narrowed or blocked arteries. This is called angioplasty. Or your doctor may suggest using healthy blood vessels to create detours around narrowed or blocked arteries. This is called bypass surgery. Follow-up care is a key part of your treatment and safety. Be sure to make and go to all appointments, and call your doctor if you are having problems. It's also a good idea to know your test results and keep a list of the medicines you take. Where can you learn more? Go to http://www.gray.com/  Enter C643 in the search box to learn more about \"Learning About Coronary Artery Disease (CAD). \"  Current as of: December 16, 2019               Content Version: 12.6  © 9755-7607 [employer]. Care instructions adapted under license by [employer] (which disclaims liability or warranty for this information). If you have questions about a medical condition or this instruction, always ask your healthcare professional. [place] [address] any warranty or liability for your use of this information.

## 2020-11-02 NOTE — DISCHARGE SUMMARY
Discharge Summary       PATIENT ID: Nano Goode  MRN: [id number]   YOB: 1950    DATE OF ADMISSION: 10/30/2020 11:35 AM    DATE OF DISCHARGE: 11/02/2020   PRIMARY CARE PROVIDER: Cady Miller MD     ATTENDING PHYSICIAN: Cuate Tavarez  DISCHARGING PROVIDER: Mk Lantigua MD    To contact this individual call [phone] and ask the  to page. If unavailable ask to be transferred the Adult Hospitalist Department. CONSULTATIONS: IP CONSULT TO CARDIOLOGY    PROCEDURES/SURGERIES: Procedure(s):  LEFT HEART CATH / CORONARY ANGIOGRAPHY    ADMITTING DIAGNOSES & HOSPITAL COURSE:     HPI  Nano Goode is a 69 y.o. male who presents with chest pain. History was primarily obtained from the patient. Patient reports that today around 10:00 he had sudden onset of mid chest pain, pain was rated in the mid part of the chest, it was quite severe with no radiation. Patient reports that he had some left-sided chest pain 2 to 3 days ago which resolved on its own. Patient reports the pain was quite discomforting today and he got concerned and decided to come to the hospital.  Patient reports that he has had a history of MI and has a stent in his coronary arteries, but does not remember if the pain was as severe as it was this time when he had the last MI. Patient reports he did take his aspirin today and 2 nitroglycerin tablets with the event and his pain got somewhat better after nitroglycerin. [facility] and was transferred to [facility] for further management and evaluation. Hospital Course  Patient admitted for further evaluation of chest pain. Patient was found to have non-STEMI. Cardiology evaluated the patient and underwent cardiac cath. Patient's prior stent in the neck AV circumflex is patent. Moderate diffuse coronary artery disease was noted. Patient's left ventricular systolic function was normal.  No intervention was needed.   Patient has been having uncontrolled blood pressure and losartan was increased 200 mg daily along with addition of Norvasc 5 mg daily. Patient to follow-up with PCP for reevaluation. Ambulatory blood pressure check was advised. DISCHARGE DIAGNOSES / PLAN:      1. Non-STEMI  2. Coronary artery disease  3. Hypertension  4. Dyslipidemia  5. Hypothyroidism     ADDITIONAL CARE RECOMMENDATIONS:     None    PENDING TEST RESULTS:   At the time of discharge the following test results are still pending: None    FOLLOW UP APPOINTMENTS:    Follow-up Information     Follow up With Specialties Details Why Contact Info    Elise Fitzgerald MD Internal Medicine   [address] [address]  [phone]               DIET: Cardiac Diet  Oral Nutritional Supplements: No Oral Supplement prescribed    ACTIVITY: Activity as tolerated    WOUND CARE: None    EQUIPMENT needed: None      DISCHARGE MEDICATIONS:  Current Discharge Medication List      START taking these medications    Details   amLODIPine (NORVASC) 5 mg tablet Take 1 Tab by mouth daily. Qty: 30 Tab, Refills: 0      clopidogreL (PLAVIX) 75 mg tab Take 1 Tab by mouth daily. Qty: 30 Tab, Refills: 0         CONTINUE these medications which have CHANGED    Details   losartan (COZAAR) 100 mg tablet Take 1 Tab by mouth daily. Qty: 30 Tab, Refills: 0         CONTINUE these medications which have NOT CHANGED    Details   rosuvastatin (CRESTOR) 40 mg tablet Take 1 Tab by mouth nightly. Qty: 90 Tab, Refills: 1    Comments: Patient needs follow up scheduled prior to further refills, thanks! Associated Diagnoses: Coronary artery disease involving native coronary artery of native heart without angina pectoris      nitroglycerin (NITROSTAT) 0.4 mg SL tablet 1 Tab by SubLINGual route every five (5) minutes as needed for Chest Pain. Do not exceed 3 doses in 24 hours.   Qty: 1 Bottle, Refills: 3    Associated Diagnoses: Coronary artery disease involving native coronary artery of native heart without angina pectoris; Hx of myocardial infarction      valACYclovir (VALTREX) 1 gram tablet Take  by mouth two (2) times daily as needed. acetaminophen (TYLENOL) 500 mg tablet Take 2 Tabs by mouth every six (6) hours as needed for Pain. Qty: 20 Tab, Refills: 0      multivitamin (ONE A DAY) tablet Take 1 Tab by mouth daily. Associated Diagnoses: Coronary artery disease involving native coronary artery of native heart without angina pectoris      aspirin delayed-release 81 mg tablet Take 81 mg by mouth daily. levothyroxine (SYNTHROID) 100 mcg tablet Take 100 mcg by mouth Daily (before breakfast). diclofenac (VOLTAREN) 1 % gel Apply 2-4 g to affected area four (4) times daily as needed (joint pain). zolpidem (AMBIEN) 10 mg tablet Take 10 mg by mouth nightly as needed for Sleep. NOTIFY YOUR PHYSICIAN FOR ANY OF THE FOLLOWING:   Fever over 101 degrees for 24 hours. Chest pain, shortness of breath, fever, chills, nausea, vomiting, diarrhea, change in mentation, falling, weakness, bleeding. Severe pain or pain not relieved by medications. Or, any other signs or symptoms that you may have questions about. DISPOSITION:    Home With:   OT  PT  HH  RN       Long term SNF/Inpatient Rehab    Independent/assisted living    Hospice    Other:       PATIENT CONDITION AT DISCHARGE:     Functional status    Poor     Deconditioned     Independent      Cognition     Lucid     Forgetful     Dementia      Catheters/lines (plus indication)    Rivas     PICC     PEG     None      Code status     Full code     DNR      PHYSICAL EXAMINATION AT DISCHARGE:  General:          Alert, cooperative, no distress, appears stated age. HEENT:           Atraumatic, anicteric sclerae, pink conjunctivae                          No oral ulcers, mucosa moist, throat clear, dentition fair  Neck:               Supple, symmetrical  Lungs:             Clear to auscultation bilaterally.   No Wheezing or Rhonchi. No rales. Chest wall:      No tenderness  No Accessory muscle use. Heart:              Regular  rhythm,  No  murmur   No edema  Abdomen:        Soft, non-tender. Not distended. Bowel sounds normal  Extremities:     No cyanosis. No clubbing,                            Skin turgor normal, Capillary refill normal  Skin:                Not pale. Not Jaundiced  No rashes   Psych:             Not anxious or agitated.   Neurologic:      Alert, moves all extremities, answers questions appropriately and responds to commands       CHRONIC MEDICAL DIAGNOSES:  Problem List as of 11/2/2020 Date Reviewed: 10/31/2020          Codes Class Noted - Resolved    RESOLVED: CAD (coronary artery disease) ICD-10-CM: I25.10  ICD-9-CM: 414.00  4/30/2018 - 10/6/2019        Chest pain ICD-10-CM: R07.9  ICD-9-CM: 786.50  10/30/2020 - Present        Hyperthyroidism ICD-10-CM: E05.90  ICD-9-CM: 242.90  Unknown - Present    Overview Signed 9/3/2018  9:35 PM by Miriam Ramirez MD     hyperthyroidism             Pure hypercholesterolemia ICD-10-CM: E78.00  ICD-9-CM: 272.0  9/3/2018 - Present        Essential hypertension ICD-10-CM: I10  ICD-9-CM: 401.9  9/3/2018 - Present        CAD (coronary artery disease), native coronary artery ICD-10-CM: I25.10  ICD-9-CM: 414.01  4/30/2018 - Present    Overview Signed 10/6/2019  1:14 PM by Miriam Ramirez MD     Normal KENYA 10-4-19- prior KELLY to Circ, diffuse dx in multiple other vessels up to 50%             H/O non-ST elevation myocardial infarction (NSTEMI) ICD-10-CM: I25.2  ICD-9-CM: 412  4/30/2018 - Present    Overview Signed 10/6/2019  1:15 PM by Miriam Ramirez MD     NSTEMI, PCI KELLY To Circ 4-30-18- Sofia follows             RESOLVED: Hyperlipidemia ICD-10-CM: E78.5  ICD-9-CM: 272.4  Unknown - 10/6/2019        RESOLVED: Hypertension ICD-10-CM: I10  ICD-9-CM: 401.9  Unknown - 10/6/2019        RESOLVED: Hypertension ICD-10-CM: I10  ICD-9-CM: 401.9  Unknown - 9/3/2018        RESOLVED: Hyperlipidemia ICD-10-CM: E78.5  ICD-9-CM: 272.4  Unknown - 9/3/2018        RESOLVED: Endocrine disease ICD-10-CM: E34.9  ICD-9-CM: 259.9  Unknown - 9/3/2018    Overview Signed 9/3/2018  9:35 PM by Darnella Kayser, MD     hyperthyroidism             RESOLVED: NSTEMI (non-ST elevated myocardial infarction) [facility]) ICD-10-CM: I21.4  ICD-9-CM: 410.70  4/30/2018 - 9/3/2018              Greater than 60 minutes were spent with the patient on counseling and coordination of care    Signed:   Huy Stone MD  11/2/2020  1:11 PM

## 2020-11-02 NOTE — PROGRESS NOTES
Cardiac Cath Lab Recovery Arrival Note:      Idris Lopez arrived to Cardiac Cath Lab, Recovery Area. Staff introduced to patient. Patient identifiers verified with NAME and DATE OF BIRTH. Procedure verified with patient. Consent forms reviewed and signed by patient or authorized representative and verified. Allergies verified. Patient and family oriented to department. Patient and family informed of procedure and plan of care. Questions answered with review. Patient prepped for procedure, per orders from physician, prior to arrival.    Patient on cardiac monitor, non-invasive blood pressure, SPO2 monitor. On RA. Patient is A&Ox 4. Patient reports no complaints. Patient in stretcher, in low position, with side rails up, call bell within reach, patient instructed to call if assistance as needed. Patient prep in: [address], [place]. Patient family has pager # [phone]  Family in: NA.   Prep by: TIMOTHY Begum

## 2020-11-02 NOTE — PROGRESS NOTES
11/2/2020 -   ALEX:  - RUR: 9%  - Disposition is for discharge to own home with transport via spouse  - Patient has been cleared for discharge today, 11/2  - spouse is present at bedside for transport    Medicare pt has received, reviewed, and signed 2nd IM letter informing them of their right to appeal the discharge. Signed copy has been placed on pt bedside chart.    CRM: Nish Rey, MPH, [address] [place] [address]; Z: [phone]

## 2020-11-02 NOTE — PROGRESS NOTES
TRANSFER - IN REPORT:    Verbal report received from Bessy Mccurdy on Isa Toney  being received from procedural area for routine progression of care. Report consisted of patients Situation, Background, Assessment and Recommendations(SBAR). Information from the following report(s) Procedure Summary, MAR and Recent Results was reviewed with the receiving clinician. Opportunity for questions and clarification was provided. Assessment completed upon patients arrival to [address] [place] [address] and care assumed. Cardiac Cath Lab Recovery Arrival Note:    Isa Toney arrived to [facility] recovery area. Patient procedure=LHC. Patient on cardiac monitor, non-invasive blood pressure, SPO2 monitor. On ra or. IV  of NS on pump at 75 ml/hr. Patient status doing well without problems. Patient is A&Ox 3. Patient reports no complaints. PROCEDURE SITE CHECK:    Procedure site:without any bleeding and small hematoma, no pain/discomfort reported at procedure site. No change in patient status. Continue to monitor patient and status.

## 2020-11-02 NOTE — PROGRESS NOTES
Verbal report given to Anabella uzair Chisholm being transferred to 3N(unit) for routine progression of care       Report consisted of patients Situation, Background, Assessment and   Recommendations(SBAR). Information from the following report(s) Procedure Summary, MAR and Recent Results was reviewed with the receiving nurse. Lines:       Opportunity for questions and clarification was provided.       Patient transported with:   Monitor  Registered Nurse

## 2020-11-02 NOTE — ROUTINE PROCESS
Per discharge paperwork, Hospital follow-up PCP transitional care appointment has been scheduled with Dr. Juanita Rubalcava for Nov 10 @ 12:15M (earliest available). Pending patient discharge.   Jennifer Sanz, Care Management Specialist.

## 2020-11-02 NOTE — PROGRESS NOTES
TRANSFER - IN REPORT:    Verbal report received from Ez(name) on Deedee Proctor  being received from cath lab(unit) for ordered procedure      Report consisted of patients Situation, Background, Assessment and   Recommendations(SBAR). Information from the following report(s) SBAR, Kardex, Procedure Summary and Recent Results was reviewed with the receiving nurse. Opportunity for questions and clarification was provided. Assessment completed upon patients arrival to unit and care assumed.

## 2020-11-02 NOTE — PROGRESS NOTES
Cardiac Cath Lab Procedure Area Arrival Note:    Zev Schultz arrived to Cardiac Cath Lab, Procedure Area. Patient identifiers verified with NAME and DATE OF BIRTH. Procedure verified with patient. Consent forms verified. Allergies verified. Patient informed of procedure and plan of care. Questions answered with review. Patient voiced understanding of procedure and plan of care. Patient on cardiac monitor, non-invasive blood pressure, SPO2 monitor. On O2 @ 2 lpm via NC.  IV of NS on pump at 75 ml/hr. Patient status doing well without problems. Patient is A&Ox 4. Patient reports no pain. Patient medicated during procedure with orders obtained and verified by Dr. Gaby Padgett. Refer to patients Cardiac Cath Lab PROCEDURE REPORT for vital signs, assessment, status, and response during procedure, printed at end of case. Printed report on chart or scanned into chart. TRANSFER - OUT REPORT:    Verbal report given to [facility]. RT on Zev Schultz being transferred to [facility] recovery for routine progression of care       Report consisted of patients Situation, Background, Assessment and   Recommendations(SBAR). Information from the following report(s) Procedure Summary was reviewed with the receiving nurse. Opportunity for questions and clarification was provided.

## 2020-11-03 ENCOUNTER — PATIENT OUTREACH (OUTPATIENT)
Dept: CASE MANAGEMENT | Age: 70
End: 2020-11-03

## 2020-11-03 ENCOUNTER — TELEPHONE (OUTPATIENT)
Dept: CARDIAC REHAB | Age: 70
End: 2020-11-03

## 2020-11-03 NOTE — TELEPHONE ENCOUNTER
11/3/2020 Cardiac Rehab: Called Mr. Meenakshi Schafer  to discuss participation in the Cardiac Rehab Program following NSTEMI on 10/31/2020. Left voicemail message.  Asael Kasper RN

## 2020-11-04 ENCOUNTER — TELEPHONE (OUTPATIENT)
Dept: CARDIAC REHAB | Age: 70
End: 2020-11-04

## 2020-11-04 NOTE — TELEPHONE ENCOUNTER
11/4/2020 Cardiac Rehab: Called and left a second message for Shell Munson with our contact number and requesting a call back. Will place a final call the week of 11/11/2020. Jessica Smith RN    11/3/2020 Cardiac Rehab: Called Mr. Shell Munson  to discuss participation in the Cardiac Rehab Program following NSTEMI on 10/31/2020. Left voicemail message.  Ros Hugo RN

## 2020-11-12 ENCOUNTER — TELEPHONE (OUTPATIENT)
Dept: CARDIAC REHAB | Age: 70
End: 2020-11-12

## 2020-11-12 NOTE — TELEPHONE ENCOUNTER
11/12/2020 Cardiac Rehab: Final call placed to Erichbrigido Rasta at [phone]. Left a message. Bertin Nettles RN    11/4/2020 Cardiac Rehab: Called and left a second message for Marvel Magdaleno with our contact number and requesting a call back. Will place a final call the week of 11/11/2020. Bertin Nettles RN     11/3/2020 Cardiac Rehab: Called Mr. Billy Brady discuss participation in the Cardiac Rehab Program following NSTEMI on 10/31/2020.  Left voicemail Melody Oliver RN

## 2020-11-19 ENCOUNTER — OFFICE VISIT (OUTPATIENT)
Dept: CARDIOLOGY CLINIC | Age: 70
End: 2020-11-19
Payer: MEDICARE

## 2020-11-19 VITALS
BODY MASS INDEX: 28.73 KG/M2 | SYSTOLIC BLOOD PRESSURE: 130 MMHG | OXYGEN SATURATION: 98 % | HEIGHT: 69 IN | WEIGHT: 194 LBS | DIASTOLIC BLOOD PRESSURE: 60 MMHG | RESPIRATION RATE: 18 BRPM | HEART RATE: 64 BPM

## 2020-11-19 DIAGNOSIS — E78.00 PURE HYPERCHOLESTEROLEMIA: ICD-10-CM

## 2020-11-19 DIAGNOSIS — I25.10 CORONARY ARTERY DISEASE INVOLVING NATIVE CORONARY ARTERY OF NATIVE HEART WITHOUT ANGINA PECTORIS: Primary | ICD-10-CM

## 2020-11-19 DIAGNOSIS — I10 ESSENTIAL HYPERTENSION: ICD-10-CM

## 2020-11-19 DIAGNOSIS — I25.2 HX OF MYOCARDIAL INFARCTION: ICD-10-CM

## 2020-11-19 PROCEDURE — G8510 SCR DEP NEG, NO PLAN REQD: HCPCS | Performed by: SPECIALIST

## 2020-11-19 PROCEDURE — 3017F COLORECTAL CA SCREEN DOC REV: CPT | Performed by: SPECIALIST

## 2020-11-19 PROCEDURE — G8752 SYS BP LESS 140: HCPCS | Performed by: SPECIALIST

## 2020-11-19 PROCEDURE — 93010 ELECTROCARDIOGRAM REPORT: CPT | Performed by: SPECIALIST

## 2020-11-19 PROCEDURE — G8419 CALC BMI OUT NRM PARAM NOF/U: HCPCS | Performed by: SPECIALIST

## 2020-11-19 PROCEDURE — G8427 DOCREV CUR MEDS BY ELIG CLIN: HCPCS | Performed by: SPECIALIST

## 2020-11-19 PROCEDURE — G8754 DIAS BP LESS 90: HCPCS | Performed by: SPECIALIST

## 2020-11-19 PROCEDURE — 99214 OFFICE O/P EST MOD 30 MIN: CPT | Performed by: SPECIALIST

## 2020-11-19 PROCEDURE — G0463 HOSPITAL OUTPT CLINIC VISIT: HCPCS | Performed by: SPECIALIST

## 2020-11-19 PROCEDURE — 1101F PT FALLS ASSESS-DOCD LE1/YR: CPT | Performed by: SPECIALIST

## 2020-11-19 PROCEDURE — 1111F DSCHRG MED/CURRENT MED MERGE: CPT | Performed by: SPECIALIST

## 2020-11-19 PROCEDURE — G8536 NO DOC ELDER MAL SCRN: HCPCS | Performed by: SPECIALIST

## 2020-11-19 PROCEDURE — 93005 ELECTROCARDIOGRAM TRACING: CPT | Performed by: SPECIALIST

## 2020-11-19 NOTE — PROGRESS NOTES
Visit Vitals  /60 (BP 1 Location: Left arm, BP Patient Position: Sitting)   Pulse 64   Resp 18   Ht 5' 9\" (1.753 m)   Wt 194 lb (88 kg)   SpO2 98%   BMI 28.65 kg/m²

## 2020-11-19 NOTE — Clinical Note
11/19/20 Patient: Marvel Magdaleno YOB: 1950 Date of Visit: 11/19/2020 Candy Gowers, MD 
[address] [facility] [address] [place] [address] VIA Facsimile: [phone] Dear Candy Gowers, MD, Thank you for referring Mr. Deandra Oconnell to [address] for evaluation. My notes for this consultation are attached. If you have questions, please do not hesitate to call me. I look forward to following your patient along with you.  
 
 
Sincerely, 
 
Reji Farr MD

## 2020-11-19 NOTE — PROGRESS NOTES
Zev Schultz     [birth year]       Kamille Marquez MD, [facility]  Date of Visit-11/19/2020   PCP is Pascale Mcgill MD   [facility]  [facility]  HPI:  Zev Schultz is a 69 y.o. male   F/u of CAD with normal KENYA in 10/2019. hospitalized April 55IE, 3508  PNAF NSTEMI with diffuse disease.    3-4 episodes stuttering substernal chest pain  First troponin abnormal at 0.36 with shortness of breath some with exertion   ILBBB  4-30-18 [facility] Findings: left main ok. Lad 50% mid after d2, second tubular lesion after d2. lcx tubular ostial 40%, 50% discrete mid lcx after om2, followed by discrete 99%. 60% tubular distal lcx between om2 and 3. rca is large, scattered irregs with 50% mid pda. 2.25 by 12mm rohini to 9atm, at 99% mid lcx  Echo 4-30-18 = EF 55-60%,  Ca++ on AV  He had a ROHINI to the circumflex,  Has hx of HTN, and previous heavy use of alcohol. Since last visit in July he was hospitalized on October 30th with CP that had been stuttering for 2-3 days. He underwent heart catheterization on the 2nd of November. Cath showed patent stent in the mid-circumflex and moderate diffuse disease. No stent required. Pt was continued on DAPT and medical management. Troponin went to 0.57. Overall the pt states he is doing well. He has not had any more episodes of chest pain and has gone back to his normal daily activities. He notes minimal sock-line edema. Denies chest pain, syncope or shortness of breath at rest, has no tachycardia, palpitations or sense of arrhythmia. EKG: SR WNL normal axis and intervals     Assessment/Plan:    Patient Instructions   We will see you back in May. Future Appointments   Date Time Provider Scotty Henderson   5/6/2021  2:00 PM Kamille Black MD [facility] AMB         1. Coronary artery disease involving native coronary artery of native heart without angina pectoris  Prior NSTEMI April 2018 and now November 2020.  Both times had relatively low troponins with moderate disease. Initially had stent to the mid circumflex which looked open on this f/u cath. I explained this may be from small vessel and I would continue DAPT lifelong. He is pain free and back to full activity. - AMB POC EKG ROUTINE W/ 12 LEADS, INTER & REP    2. Essential hypertension  BP is stable today on CCB and ARB. I don't know if the HTN contributed to the MI beyond being a risk factor. If we see any higher BP's we will go up on the Amlodipine. BP Readings from Last 6 Encounters:   11/19/20 130/60   11/02/20 (!) 140/72   07/17/20 130/62   10/04/19 140/80   10/04/19 160/72   06/13/19 127/65      Key CAD CHF Meds             losartan (COZAAR) 100 mg tablet (Taking) Take 1 Tab by mouth daily. amLODIPine (NORVASC) 5 mg tablet (Taking) Take 1 Tab by mouth daily. clopidogreL (PLAVIX) 75 mg tab (Taking) Take 1 Tab by mouth daily. rosuvastatin (CRESTOR) 40 mg tablet (Taking) Take 1 Tab by mouth nightly. nitroglycerin (NITROSTAT) 0.4 mg SL tablet (Taking) 1 Tab by SubLINGual route every five (5) minutes as needed for Chest Pain. Do not exceed 3 doses in 24 hours. aspirin delayed-release 81 mg tablet (Taking) Take 81 mg by mouth daily. 3. Pure hypercholesterolemia  Lipids on high potency statin as appropriate for secondary prevention. Note LDL went from 69 to very low at 26. At goal , denies excess muscle aches or new liver issues  Key Antihyperlipidemia Meds             rosuvastatin (CRESTOR) 40 mg tablet (Taking) Take 1 Tab by mouth nightly. Lab Results   Component Value Date/Time    LDL, calculated 26.4 10/31/2020 03:52 AM       4. Hx of myocardial infarction     F/u in May     Impression:   1. Coronary artery disease involving native coronary artery of native heart without angina pectoris    2. Essential hypertension    3. Pure hypercholesterolemia    4.  Hx of myocardial infarction       Cardiac History:   hospitalized April 30th, 2018  with NSTEMI with diffuse disease. 3-4 episodes stuttering substernal chest pain  First troponin abnormal at 0.36 with shortness of breath some with exertion   ILBBB  4-30-18 [facility] Findings: left main ok. Lad 50% mid after d2, second tubular lesion after d2. lcx tubular ostial 40%, 50% discrete mid lcx after om2, followed by discrete 99%. 60% tubular distal lcx between om2 and 3. rca is large, scattered irregs with 50% mid pda. 2.25 by 12mm rohini to 9atm, at 99% mid lcx  Echo 4-30-18 = EF 55-60%,  Ca++ on AV  He had a ROHINI to the circumflex,  Has hx of HTN, and previous heavy use of alcohol. ROS-except as noted above. . A complete cardiac and respiratory are reviewed and negative except as above ; Resp-denies wheezing  or productive cough,. Const- No unusual weight loss or fever; Neuro-no recent seizure or CVA ; GI- No BRBPR, abdom pain, bloating ; - no  hematuria   see supplement sheet, initialed and to be scanned by staff  Past Medical History:   Diagnosis Date    CAD (coronary artery disease), native coronary artery 04/30/2018    Normal KENYA 10-4-19- prior ROHINI to Circ, diffuse dx in multiple other vessels up to 50%    H/O non-ST elevation myocardial infarction (NSTEMI) 04/30/2018    NSTEMI, PCI ROHINI To Circ 4-30-18- [facility] follows    Hyperlipidemia     Hypertension     Hyperthyroidism     hyperthyroidism      Social Hx= reports that he has never smoked. He has never used smokeless tobacco. He reports current alcohol use of about 10.0 standard drinks of alcohol per week. He reports that he does not use drugs. Exam and Labs:  /60 (BP 1 Location: Left arm, BP Patient Position: Sitting)   Pulse 64   Resp 18   Ht 5' 9\" (1.753 m)   Wt 194 lb (88 kg)   SpO2 98%   BMI 28.65 kg/m² Constitutional:  NAD, comfortable  Head: NC,AT. Eyes: No scleral icterus. Neck:  Neck supple. No JVD present. Throat: moist mucous membranes. Chest: Effort normal & normal respiratory excursion . Neurological: alert, conversant and oriented . Skin: Skin is not cold. No obvious systemic rash noted. Not diaphoretic. No erythema. Psychiatric:  Grossly normal mood and affect. Behavior appears normal. Extremities:  no clubbing or cyanosis. Abdomen: non distended    Lungs:breath sounds normal. No stridor. distress, wheezes or  Rales. Heart: normal rate, regular rhythm, normal S1, S2, no murmurs, rubs, clicks or gallops , PMI non displaced. Edema: Edema is sock-line. Lab Results   Component Value Date/Time    Cholesterol, total 138 10/31/2020 03:52 AM    HDL Cholesterol 102 10/31/2020 03:52 AM    LDL, calculated 26.4 10/31/2020 03:52 AM    Triglyceride 48 10/31/2020 03:52 AM    CHOL/HDL Ratio 1.4 10/31/2020 03:52 AM     Lab Results   Component Value Date/Time    Sodium 142 11/02/2020 05:01 AM    Potassium 3.9 11/02/2020 05:01 AM    Chloride 112 (H) 11/02/2020 05:01 AM    CO2 24 11/02/2020 05:01 AM    Anion gap 6 11/02/2020 05:01 AM    Glucose 93 11/02/2020 05:01 AM    BUN 16 11/02/2020 05:01 AM    Creatinine 0.94 11/02/2020 05:01 AM    BUN/Creatinine ratio 17 11/02/2020 05:01 AM    GFR est AA >60 11/02/2020 05:01 AM    GFR est non-AA >60 11/02/2020 05:01 AM    Calcium 8.9 11/02/2020 05:01 AM      Wt Readings from Last 3 Encounters:   11/19/20 194 lb (88 kg)   11/02/20 193 lb 12.6 oz (87.9 kg)   07/17/20 189 lb (85.7 kg)      BP Readings from Last 3 Encounters:   11/19/20 130/60   11/02/20 (!) 140/72   07/17/20 130/62      Current Outpatient Medications   Medication Sig    losartan (COZAAR) 100 mg tablet Take 1 Tab by mouth daily.  amLODIPine (NORVASC) 5 mg tablet Take 1 Tab by mouth daily.  clopidogreL (PLAVIX) 75 mg tab Take 1 Tab by mouth daily.  rosuvastatin (CRESTOR) 40 mg tablet Take 1 Tab by mouth nightly.  nitroglycerin (NITROSTAT) 0.4 mg SL tablet 1 Tab by SubLINGual route every five (5) minutes as needed for Chest Pain. Do not exceed 3 doses in 24 hours.     valACYclovir (VALTREX) 1 gram tablet Take  by mouth two (2) times daily as needed.  acetaminophen (TYLENOL) 500 mg tablet Take 2 Tabs by mouth every six (6) hours as needed for Pain.  multivitamin (ONE A DAY) tablet Take 1 Tab by mouth daily.  aspirin delayed-release 81 mg tablet Take 81 mg by mouth daily.  levothyroxine (SYNTHROID) 100 mcg tablet Take 100 mcg by mouth Daily (before breakfast).  diclofenac (VOLTAREN) 1 % gel Apply 2-4 g to affected area four (4) times daily as needed (joint pain).  zolpidem (AMBIEN) 10 mg tablet Take 10 mg by mouth nightly as needed for Sleep. No current facility-administered medications for this visit. Impression see above.       Written by Stephanie Monahan, as dictated by Nadia Gonzalez MD.

## 2021-01-01 NOTE — ED TRIAGE NOTES
TRIAGE NOTE:  Patient reports chest pains 6/10 around 9am this morning. He ate an egg sandwich. Pains returned about 12 today. Pain has subsided at the moment. No Yes

## 2021-01-10 DIAGNOSIS — I25.10 CORONARY ARTERY DISEASE INVOLVING NATIVE CORONARY ARTERY OF NATIVE HEART WITHOUT ANGINA PECTORIS: ICD-10-CM

## 2021-01-12 RX ORDER — ROSUVASTATIN CALCIUM 40 MG/1
TABLET, COATED ORAL
Qty: 90 TAB | Refills: 1 | Status: SHIPPED | OUTPATIENT
Start: 2021-01-12 | End: 2021-07-23

## 2021-07-20 DIAGNOSIS — E78.00 PURE HYPERCHOLESTEROLEMIA: Primary | ICD-10-CM

## 2021-07-20 DIAGNOSIS — I25.10 CORONARY ARTERY DISEASE INVOLVING NATIVE CORONARY ARTERY OF NATIVE HEART WITHOUT ANGINA PECTORIS: ICD-10-CM

## 2021-07-23 RX ORDER — ROSUVASTATIN CALCIUM 40 MG/1
40 TABLET, COATED ORAL
Qty: 90 TABLET | Refills: 0 | Status: SHIPPED | OUTPATIENT
Start: 2021-07-23 | End: 2021-10-22

## 2021-10-21 DIAGNOSIS — E78.00 PURE HYPERCHOLESTEROLEMIA: ICD-10-CM

## 2021-10-21 DIAGNOSIS — I25.10 CORONARY ARTERY DISEASE INVOLVING NATIVE CORONARY ARTERY OF NATIVE HEART WITHOUT ANGINA PECTORIS: ICD-10-CM

## 2021-10-22 RX ORDER — ROSUVASTATIN CALCIUM 40 MG/1
40 TABLET, COATED ORAL
Qty: 90 TABLET | Refills: 0 | Status: SHIPPED | OUTPATIENT
Start: 2021-10-22 | End: 2021-12-29

## 2021-12-26 DIAGNOSIS — E78.00 PURE HYPERCHOLESTEROLEMIA: ICD-10-CM

## 2021-12-26 DIAGNOSIS — I25.10 CORONARY ARTERY DISEASE INVOLVING NATIVE CORONARY ARTERY OF NATIVE HEART WITHOUT ANGINA PECTORIS: ICD-10-CM

## 2021-12-29 RX ORDER — ROSUVASTATIN CALCIUM 40 MG/1
40 TABLET, COATED ORAL
Qty: 90 TABLET | Refills: 3 | Status: SHIPPED | OUTPATIENT
Start: 2021-12-29 | End: 2021-12-29

## 2021-12-29 RX ORDER — ROSUVASTATIN CALCIUM 40 MG/1
40 TABLET, COATED ORAL
Qty: 90 TABLET | Refills: 3 | Status: SHIPPED | OUTPATIENT
Start: 2021-12-29

## 2022-03-18 PROBLEM — R07.9 CHEST PAIN: Status: ACTIVE | Noted: 2020-10-30

## 2022-03-19 PROBLEM — I10 ESSENTIAL HYPERTENSION: Status: ACTIVE | Noted: 2018-09-03

## 2022-03-19 PROBLEM — I25.10 CAD (CORONARY ARTERY DISEASE), NATIVE CORONARY ARTERY: Status: ACTIVE | Noted: 2018-04-30

## 2022-03-19 PROBLEM — E78.00 PURE HYPERCHOLESTEROLEMIA: Status: ACTIVE | Noted: 2018-09-03

## 2022-03-19 PROBLEM — I25.2 H/O NON-ST ELEVATION MYOCARDIAL INFARCTION (NSTEMI): Status: ACTIVE | Noted: 2018-04-30

## (undated) DEVICE — RADIFOCUS OPTITORQUE ANGIOGRAPHIC CATHETER: Brand: OPTITORQUE

## (undated) DEVICE — GLIDESHEATH SLENDER STAINLESS STEEL KIT: Brand: GLIDESHEATH SLENDER

## (undated) DEVICE — WASTE KIT - ST MARY: Brand: MEDLINE INDUSTRIES, INC.

## (undated) DEVICE — TR BAND RADIAL ARTERY COMPRESSION DEVICE: Brand: TR BAND

## (undated) DEVICE — KIT MFLD ISOLATN NACL CNTRST PRT TBNG SPIK W/ PRSS TRNSDUC

## (undated) DEVICE — SPECIAL PROCEDURE DRAPE 32" X 34": Brand: SPECIAL PROCEDURE DRAPE

## (undated) DEVICE — KIT HND CTRL 3 W STPCOCK ROT END 54IN PREM HI PRSS TBNG AT

## (undated) DEVICE — GUIDEWIRE VASC L260CM DIA0.035IN TIP L3MM STD EXCHG PTFE J

## (undated) DEVICE — PACK PROCEDURE SURG HRT CATH

## (undated) DEVICE — SPLINT WR POS F/ARTERIAL ACC -- BX/10

## (undated) DEVICE — KIT MED IMAG CNTRST AGNT W/ IOPAMIDOL REUSE